# Patient Record
Sex: MALE | Race: WHITE | NOT HISPANIC OR LATINO | Employment: OTHER | URBAN - METROPOLITAN AREA
[De-identification: names, ages, dates, MRNs, and addresses within clinical notes are randomized per-mention and may not be internally consistent; named-entity substitution may affect disease eponyms.]

---

## 2017-03-08 ENCOUNTER — OFFICE VISIT (OUTPATIENT)
Dept: FAMILY MEDICINE | Facility: CLINIC | Age: 67
End: 2017-03-08
Payer: COMMERCIAL

## 2017-03-08 VITALS
TEMPERATURE: 97.8 F | DIASTOLIC BLOOD PRESSURE: 82 MMHG | WEIGHT: 159 LBS | SYSTOLIC BLOOD PRESSURE: 138 MMHG | BODY MASS INDEX: 25.55 KG/M2 | HEIGHT: 66 IN | HEART RATE: 54 BPM

## 2017-03-08 DIAGNOSIS — K21.9 GASTROESOPHAGEAL REFLUX DISEASE WITHOUT ESOPHAGITIS: ICD-10-CM

## 2017-03-08 DIAGNOSIS — I10 ESSENTIAL HYPERTENSION: ICD-10-CM

## 2017-03-08 DIAGNOSIS — J20.9 BRONCHITIS WITH BRONCHOSPASM: ICD-10-CM

## 2017-03-08 PROCEDURE — 99214 OFFICE O/P EST MOD 30 MIN: CPT | Performed by: FAMILY MEDICINE

## 2017-03-08 RX ORDER — ATENOLOL 50 MG/1
50 TABLET ORAL DAILY
Qty: 30 TABLET | Refills: 11 | Status: SHIPPED | OUTPATIENT
Start: 2017-03-08 | End: 2018-03-05

## 2017-03-08 RX ORDER — ALBUTEROL SULFATE 90 UG/1
2 AEROSOL, METERED RESPIRATORY (INHALATION) EVERY 4 HOURS PRN
Qty: 1 INHALER | Refills: 11 | Status: SHIPPED | OUTPATIENT
Start: 2017-03-08 | End: 2018-03-05

## 2017-03-08 NOTE — PATIENT INSTRUCTIONS
Thank you for choosing [facility].  You may be receiving a survey in the mail from Arnaldo Mitchell regarding your visit today.  Please take a few minutes to complete and return the survey to let us know how we are doing.      If you have questions or concerns, please contact us via Metaconomy or you can contact your care team at [phone].    Our Clinic hours are:  Monday 6:40 am  to 7:00 pm  Tuesday -Friday 6:40 am to 5:00 pm    The [place] outpatient lab hours are:  Monday - Friday 6:10 am to 4:45 pm  Saturdays 7:00 am to 11:00 am  Appointments are required, call [phone]    If you have clinical questions after hours or would like to schedule an appointment,  call the clinic at [phone].    (K21.9) Gastroesophageal reflux disease without esophagitis  Comment:   Plan: omeprazole (PRILOSEC) 20 MG CR capsule        Stay on the med and the dietary and mechanical instructions. Refill and recheck annually if doing well.   The goal for the symptoms is to be zero.     (I10) Essential hypertension  Comment:   Plan: atenolol (TENORMIN) 50 MG tablet        Monitor and record the Bp readings and the goal for the average is under 130/80. Use the med and the non drug therapies.   If doing well then refill and recheck annually.     (J20.9) Bronchitis with bronchospasm  Comment:   Plan: albuterol (PROAIR HFA/PROVENTIL HFA/VENTOLIN         HFA) 108 (90 BASE) MCG/ACT Inhaler        Use the inhaler as needed and this is refilled for one year. Use the flu shot in the fall.

## 2017-03-08 NOTE — NURSING NOTE
"Chief Complaint   Patient presents with     Hypertension     Recheck on blood pressure.     Gastric Problem     Recheck on medication.     Derm Problem     Check of a mole on the left chest.       Initial /82  Pulse 54  Temp 97.8  F (36.6  C) (Tympanic)  Ht 5' 5.75\" (1.67 m)  Wt 159 lb (72.1 kg)  BMI 25.86 kg/m2 Estimated body mass index is 25.86 kg/(m^2) as calculated from the following:    Height as of this encounter: 5' 5.75\" (1.67 m).    Weight as of this encounter: 159 lb (72.1 kg).  Medication Reconciliation: complete  "

## 2017-03-08 NOTE — PROGRESS NOTES
"  SUBJECTIVE:                                                    Willian Reid is a 67 year old male who presents to clinic today for the following health issues:      Hypertension Follow-up      Outpatient blood pressures are being checked at home.  Results are 133/83, 128/73, 128/77, 125/78, 122/77, 129/77, 119/71, 120/71, 133/77.  Pulse ranges from 53-67. The average BP is 124/76    Low Salt Diet: no added salt     Medication Followup of Reflux    Taking Medication as prescribed: yes    Side Effects:  None    Medication Helping Symptoms:  yes     DERM:  Here to have a mole on the left chest checked.  Has been there for years.  No change in size and it does not itch.  No bleeding.      Amount of exercise or physical activity: Treadmill one hour 7 days per week.    Problems taking medications regularly: No    Medication side effects: none  Diet: No added salt.      Current Outpatient Prescriptions:      omeprazole (PRILOSEC) 20 MG capsule, Take 1 capsule (20 mg) by mouth daily, Disp: 30 capsule, Rfl: 11     atenolol (TENORMIN) 50 MG tablet, Take 1 tablet (50 mg) by mouth daily Pt will call to order, Disp: 30 tablet, Rfl: 11     albuterol (PROAIR HFA, PROVENTIL HFA, VENTOLIN HFA) 108 (90 BASE) MCG/ACT inhaler, Inhale 2 puffs into the lungs every 4 hours as needed for shortness of breath / dyspnea or wheezing, Disp: 1 Inhaler, Rfl: 2     aspirin 81 MG tablet, Take  by mouth., Disp: , Rfl:      MULTIVITAMINS OR TABS, 1 TABLET ORALLY DAILY, Disp: 100, Rfl: 3    Patient Active Problem List   Diagnosis     Other tenosynovitis of hand and wrist     Degeneration of lumbar or lumbosacral intervertebral disc     Esophageal reflux     Plantar fasciitis     Hypertension     CARDIOVASCULAR SCREENING; LDL GOAL LESS THAN 130     Hypertriglyceridemia     Prediabetes     Advanced directives, counseling/discussion       Blood pressure 138/82, pulse 54, temperature 97.8  F (36.6  C), temperature source Tympanic, height 5' 5.75\" " (1.67 m), weight 159 lb (72.1 kg).    Exam:  GENERAL APPEARANCE: healthy, alert and no distress  EYES: EOMI,  PERRL  NECK: no adenopathy, no asymmetry, masses, or scars and thyroid normal to palpation  RESP: lungs clear to auscultation - no rales, rhonchi or wheezes  CV: regular rates and rhythm, normal S1 S2, no S3 or S4 and no murmur, click or rub -  SKIN: mole on the left upper chest about 8 mm in diameter, brown and minimally raised and   Not suspicious for cancer.    PSYCH: mentation appears normal and affect normal/bright      (K21.9) Gastroesophageal reflux disease without esophagitis  Comment:   Plan: omeprazole (PRILOSEC) 20 MG CR capsule        Stay on the med and the dietary and mechanical instructions. Refill and recheck annually if doing well.   The goal for the symptoms is to be zero.     (I10) Essential hypertension  Comment:   Plan: atenolol (TENORMIN) 50 MG tablet        Monitor and record the Bp readings and the goal for the average is under 130/80. Use the med and the non drug therapies.   If doing well then refill and recheck annually.     (J20.9) Bronchitis with bronchospasm  Comment:   Plan: albuterol (PROAIR HFA/PROVENTIL HFA/VENTOLIN         HFA) 108 (90 BASE) MCG/ACT Inhaler        Use the inhaler as needed and this is refilled for one year. Use the flu shot in the fall.     Kirk Oliver

## 2017-03-08 NOTE — MR AVS SNAPSHOT
After Visit Summary   3/8/2017    Willian Reid    MRN: [id number]           Patient Information     Date Of Birth          1950        Visit Information        Provider Department      3/8/2017 9:00 AM Kirk Oliver MD [facility]        Today's Diagnoses     Gastroesophageal reflux disease without esophagitis        Essential hypertension        Bronchitis with bronchospasm          Care Instructions          Thank you for choosing [facility].  You may be receiving a survey in the mail from [facility] regarding your visit today.  Please take a few minutes to complete and return the survey to let us know how we are doing.      If you have questions or concerns, please contact us via Main[employer] or you can contact your care team at [phone].    Our Clinic hours are:  Monday 6:40 am  to 7:00 pm  Tuesday -Friday 6:40 am to 5:00 pm    The [place] outpatient lab hours are:  Monday - Friday 6:10 am to 4:45 pm  Saturdays 7:00 am to 11:00 am  Appointments are required, call [phone]    If you have clinical questions after hours or would like to schedule an appointment,  call the clinic at [phone].    (K21.9) Gastroesophageal reflux disease without esophagitis  Comment:   Plan: omeprazole (PRILOSEC) 20 MG CR capsule        Stay on the med and the dietary and mechanical instructions. Refill and recheck annually if doing well.   The goal for the symptoms is to be zero.     (I10) Essential hypertension  Comment:   Plan: atenolol (TENORMIN) 50 MG tablet        Monitor and record the Bp readings and the goal for the average is under 130/80. Use the med and the non drug therapies.   If doing well then refill and recheck annually.     (J20.9) Bronchitis with bronchospasm  Comment:   Plan: albuterol (PROAIR HFA/PROVENTIL HFA/VENTOLIN         HFA) 108 (90 BASE) MCG/ACT Inhaler        Use the inhaler as needed and this is refilled for one year. Use the flu shot in the fall.  "          Follow-ups after your visit        Who to contact     If you have questions or need follow up information about today's clinic visit or your schedule please contact [facility] directly at [phone].  Normal or non-critical lab and imaging results will be communicated to you by MyChart, letter or phone within 4 business days after the clinic has received the results. If you do not hear from us within 7 days, please contact the clinic through MyChart or phone. If you have a critical or abnormal lab result, we will notify you by phone as soon as possible.  Submit refill requests through [employer] or call your pharmacy and they will forward the refill request to us. Please allow 3 business days for your refill to be completed.          Additional Information About Your Visit        [facility] Information     [employer] gives you secure access to your electronic health record. If you see a primary care provider, you can also send messages to your care team and make appointments. If you have questions, please call your primary care clinic.  If you do not have a primary care provider, please call [phone] and they will assist you.        Care EveryWhere ID     This is your Care EveryWhere ID. This could be used by other organizations to access your [place] medical records  [id number]        Your Vitals Were     Pulse Temperature Height BMI (Body Mass Index)          54 97.8  F (36.6  C) (Tympanic) 5' 5.75\" (1.67 m) 25.86 kg/m2         Blood Pressure from Last 3 Encounters:   03/08/17 138/82   03/02/16 136/82   02/04/16 134/78    Weight from Last 3 Encounters:   03/08/17 159 lb (72.1 kg)   03/02/16 157 lb (71.2 kg)   02/04/16 160 lb (72.6 kg)              Today, you had the following     No orders found for display         Where to get your medicines      These medications were sent to [facility] [place] - [address]  [address] [address]     Phone:  " [phone]     albuterol 108 (90 BASE) MCG/ACT Inhaler    atenolol 50 MG tablet    omeprazole 20 MG CR capsule          Primary Care Provider Office Phone # Fax #    Kirk Oliver [phone]       [facility] [address] [address]        Thank you!     Thank you for choosing [facility]  for your care. Our goal is always to provide you with excellent care. Hearing back from our patients is one way we can continue to improve our services. Please take a few minutes to complete the written survey that you may receive in the mail after your visit with us. Thank you!             Your Updated Medication List - Protect others around you: Learn how to safely use, store and throw away your medicines at www.disposemymeds.org.          This list is accurate as of: 3/8/17  9:33 AM.  Always use your most recent med list.                   Brand Name Dispense Instructions for use    albuterol 108 (90 BASE) MCG/ACT Inhaler    PROAIR HFA/PROVENTIL HFA/VENTOLIN HFA    1 Inhaler    Inhale 2 puffs into the lungs every 4 hours as needed for shortness of breath / dyspnea or wheezing       aspirin 81 MG tablet      Take  by mouth.       atenolol 50 MG tablet    TENORMIN    30 tablet    Take 1 tablet (50 mg) by mouth daily Pt will call to order       multivitamin per tablet     100    1 TABLET ORALLY DAILY       omeprazole 20 MG CR capsule    priLOSEC    30 capsule    Take 1 capsule (20 mg) by mouth daily

## 2017-10-10 ENCOUNTER — ALLIED HEALTH/NURSE VISIT (OUTPATIENT)
Dept: FAMILY MEDICINE | Facility: CLINIC | Age: 67
End: 2017-10-10
Payer: COMMERCIAL

## 2017-10-10 DIAGNOSIS — Z23 NEED FOR PROPHYLACTIC VACCINATION AND INOCULATION AGAINST INFLUENZA: Primary | ICD-10-CM

## 2017-10-10 PROCEDURE — 99207 ZZC NO CHARGE NURSE ONLY: CPT

## 2017-10-10 PROCEDURE — G0008 ADMIN INFLUENZA VIRUS VAC: HCPCS

## 2017-10-10 PROCEDURE — 90662 IIV NO PRSV INCREASED AG IM: CPT

## 2017-10-10 NOTE — MR AVS SNAPSHOT
After Visit Summary   10/10/2017    Willian Reid    MRN: [id number]           Patient Information     Date Of Birth          1950        Visit Information        Provider Department      10/10/2017 10:35 AM [facility]        Today's Diagnoses     Need for prophylactic vaccination and inoculation against influenza    -  1       Follow-ups after your visit        Who to contact     If you have questions or need follow up information about today's clinic visit or your schedule please contact [facility] directly at [phone].  Normal or non-critical lab and imaging results will be communicated to you by Mesitishart, letter or phone within 4 business days after the clinic has received the results. If you do not hear from us within 7 days, please contact the clinic through [email or web address] or phone. If you have a critical or abnormal lab result, we will notify you by phone as soon as possible.  Submit refill requests through [employer] or call your pharmacy and they will forward the refill request to us. Please allow 3 business days for your refill to be completed.          Additional Information About Your Visit        MyChart Information     [employer] gives you secure access to your electronic health record. If you see a primary care provider, you can also send messages to your care team and make appointments. If you have questions, please call your primary care clinic.  If you do not have a primary care provider, please call [phone] and they will assist you.        Care EveryWhere ID     This is your Care EveryWhere ID. This could be used by other organizations to access your [place] medical records  [id number]         Blood Pressure from Last 3 Encounters:   03/08/17 138/82   03/02/16 136/82   02/04/16 134/78    Weight from Last 3 Encounters:   03/08/17 159 lb (72.1 kg)   03/02/16 157 lb (71.2 kg)   02/04/16 160 lb (72.6 kg)              We Performed  the Following     ADMIN INFLUENZA (For MEDICARE Patients ONLY) []     FLU VACCINE, INCREASED ANTIGEN, PRESV FREE, AGE 65+ [98370]        Primary Care Provider Office Phone # Fax #    Kirk Oliver [phone] [address] [address]        Equal Access to Services     COURTNEY ROCHE : Hadii aad ku hadasho Soomaali, waaxda luqadaha, qaybta kaalmada adeegyada, waxay idiin hayaan adeeg khlouisesh larishabhn . So [facility] [phone].    ATENCIÓN: Si habla español, tiene a avila disposición servicios gratuitos de asistencia lingüística. Llame al [phone].    We comply with applicable federal civil rights laws and [place] laws. We do not discriminate on the basis of race, color, national origin, age, disability, sex, sexual orientation, or gender identity.            Thank you!     Thank you for choosing [facility]  for your care. Our goal is always to provide you with excellent care. Hearing back from our patients is one way we can continue to improve our services. Please take a few minutes to complete the written survey that you may receive in the mail after your visit with us. Thank you!             Your Updated Medication List - Protect others around you: Learn how to safely use, store and throw away your medicines at www.disposemymeds.org.          This list is accurate as of: 10/10/17 10:45 AM.  Always use your most recent med list.                   Brand Name Dispense Instructions for use Diagnosis    albuterol 108 (90 BASE) MCG/ACT Inhaler    PROAIR HFA/PROVENTIL HFA/VENTOLIN HFA    1 Inhaler    Inhale 2 puffs into the lungs every 4 hours as needed for shortness of breath / dyspnea or wheezing    Bronchitis with bronchospasm       aspirin 81 MG tablet      Take  by mouth.        atenolol 50 MG tablet    TENORMIN    30 tablet    Take 1 tablet (50 mg) by mouth daily Pt will call to order    Essential hypertension       multivitamin per tablet     100    1  TABLET ORALLY DAILY    Hypertension       omeprazole 20 MG CR capsule    priLOSEC    30 capsule    Take 1 capsule (20 mg) by mouth daily    Gastroesophageal reflux disease without esophagitis

## 2017-10-10 NOTE — PROGRESS NOTES
Injectable Influenza Immunization Documentation    1.  Is the person to be vaccinated sick today?   No    2. Does the person to be vaccinated have an allergy to a component   of the vaccine?   No    3. Has the person to be vaccinated ever had a serious reaction   to influenza vaccine in the past?   No    4. Has the person to be vaccinated ever had Guillain-Barré syndrome?   No    Form completed by Katie Valle CMA  Per orders of Dr. Felix, injection of flu vaccine given by Katie Valle. Patient instructed to remain in clinic for 15 minutes afterwards, and to report any adverse reaction to me immediately.

## 2018-03-05 ENCOUNTER — OFFICE VISIT (OUTPATIENT)
Dept: FAMILY MEDICINE | Facility: CLINIC | Age: 68
End: 2018-03-05
Payer: COMMERCIAL

## 2018-03-05 VITALS
BODY MASS INDEX: 26.12 KG/M2 | WEIGHT: 156.8 LBS | SYSTOLIC BLOOD PRESSURE: 118 MMHG | HEIGHT: 65 IN | HEART RATE: 68 BPM | TEMPERATURE: 97.2 F | DIASTOLIC BLOOD PRESSURE: 80 MMHG

## 2018-03-05 DIAGNOSIS — R73.03 PREDIABETES: ICD-10-CM

## 2018-03-05 DIAGNOSIS — K21.9 GASTROESOPHAGEAL REFLUX DISEASE WITHOUT ESOPHAGITIS: ICD-10-CM

## 2018-03-05 DIAGNOSIS — E78.1 HYPERTRIGLYCERIDEMIA: ICD-10-CM

## 2018-03-05 DIAGNOSIS — Z23 NEED FOR PROPHYLACTIC VACCINATION AGAINST STREPTOCOCCUS PNEUMONIAE (PNEUMOCOCCUS): ICD-10-CM

## 2018-03-05 DIAGNOSIS — Z12.11 SPECIAL SCREENING FOR MALIGNANT NEOPLASMS, COLON: Primary | ICD-10-CM

## 2018-03-05 DIAGNOSIS — J20.9 BRONCHITIS WITH BRONCHOSPASM: ICD-10-CM

## 2018-03-05 DIAGNOSIS — I10 ESSENTIAL HYPERTENSION: ICD-10-CM

## 2018-03-05 PROCEDURE — 99214 OFFICE O/P EST MOD 30 MIN: CPT | Mod: 25 | Performed by: FAMILY MEDICINE

## 2018-03-05 PROCEDURE — G0009 ADMIN PNEUMOCOCCAL VACCINE: HCPCS | Performed by: FAMILY MEDICINE

## 2018-03-05 PROCEDURE — 90670 PCV13 VACCINE IM: CPT | Performed by: FAMILY MEDICINE

## 2018-03-05 RX ORDER — ALBUTEROL SULFATE 90 UG/1
2 AEROSOL, METERED RESPIRATORY (INHALATION) EVERY 4 HOURS PRN
Qty: 1 INHALER | Refills: 11 | Status: SHIPPED | OUTPATIENT
Start: 2018-03-05 | End: 2019-12-19

## 2018-03-05 RX ORDER — ATENOLOL 50 MG/1
50 TABLET ORAL DAILY
Qty: 30 TABLET | Refills: 11 | Status: SHIPPED | OUTPATIENT
Start: 2018-03-05 | End: 2019-02-06

## 2018-03-05 NOTE — NURSING NOTE
"Screening Questionnaire for Adult Immunization    Are you sick today?   No   Do you have allergies to medications, food, a vaccine component or latex?   Yes   Have you ever had a serious reaction after receiving a vaccination?   No   Do you have a long-term health problem with heart disease, lung disease, asthma, kidney disease, metabolic disease (e.g. diabetes), anemia, or other blood disorder?   No   Do you have cancer, leukemia, HIV/AIDS, or any other immune system problem?   No   In the past 3 months, have you taken medications that affect  your immune system, such as prednisone, other steroids, or anticancer drugs; drugs for the treatment of rheumatoid arthritis, Crohn s disease, or psoriasis; or have you had radiation treatments?   No   Have you had a seizure, or a brain or other nervous system problem?   No   During the past year, have you received a transfusion of blood or blood     products, or been given immune (gamma) globulin or antiviral drug?   No   For women: Are you pregnant or is there a chance you could become        pregnant during the next month?   No   Have you received any vaccinations in the past 4 weeks?   No     Immunization questionnaire was positive for at least one answer.  Ok per guidelines for prevnar 13.        . Patient instructed to remain in clinic for 15 minutes afterwards, and to report any adverse reaction to me immediately.       Screening performed by Bismark Arnett on 3/5/2018 at 9:09 AM.  Chief Complaint   Patient presents with     Gastrophageal Reflux     re check, renew omeprazole     Hypertension     re check, renew Atenolol     Refill Request     Proair inhaler     Health Maintenance     Due for FIT test- order placed, will get prevnar 13 today        Initial /80 (BP Location: Left arm, Patient Position: Chair, Cuff Size: Adult Regular)  Pulse 68  Temp 97.2  F (36.2  C) (Tympanic)  Ht 5' 5.25\" (1.657 m)  Wt 156 lb 12.8 oz (71.1 kg)  BMI 25.89 kg/m2 Estimated " "body mass index is 25.89 kg/(m^2) as calculated from the following:    Height as of this encounter: 5' 5.25\" (1.657 m).    Weight as of this encounter: 156 lb 12.8 oz (71.1 kg).  Medication Reconciliation: complete    "

## 2018-03-05 NOTE — MR AVS SNAPSHOT
After Visit Summary   3/5/2018    Willian Reid    MRN: [id number]           Patient Information     Date Of Birth          1950        Visit Information        Provider Department      3/5/2018 9:00 AM Kirk Oliver MD [facility]        Today's Diagnoses     Special screening for malignant neoplasms, colon    -  1    Need for prophylactic vaccination against Streptococcus pneumoniae (pneumococcus)        Hypertriglyceridemia        Prediabetes        Gastroesophageal reflux disease without esophagitis        Essential hypertension        Bronchitis with bronchospasm          Care Instructions          Thank you for choosing [facility].  You may be receiving a survey in the mail from Arnaldo Mitchell regarding your visit today.  Please take a few minutes to complete and return the survey to let us know how we are doing.      If you have questions or concerns, please contact us via [employer] or you can contact your care team at [phone].    Our Clinic hours are:  Monday 6:40 am  to 7:00 pm  Tuesday -Friday 6:40 am to 5:00 pm    The [place] outpatient lab hours are:  Monday - Friday 6:10 am to 4:45 pm  Saturdays 7:00 am to 11:00 am  Appointments are required, call [phone]    If you have clinical questions after hours or would like to schedule an appointment,  call the clinic at [phone].    (K21.9) Gastroesophageal reflux disease without esophagitis  Comment:   Plan: omeprazole (PRILOSEC) 20 MG CR capsule        Use the med daily and the dietary and mechanical instructions and the goal for the symptoms is to be zero.  Refill and recheck annually if doing well.     (I10) Essential hypertension  Comment:   Plan: atenolol (TENORMIN) 50 MG tablet        Monitor and record the BP at rest and the goal for the average is under 130/80.   Use the med and this is refilled for one year. Call if any side effects. Recheck annually If the BP is normal.     (J20.9) Bronchitis  with bronchospasm  Comment:   Plan: albuterol (PROAIR HFA/PROVENTIL HFA/VENTOLIN         HFA) 108 (90 BASE) MCG/ACT Inhaler        Use the inhaler as needed. Identify triggers to avoid. Refill for one year.   Get the flu shote every fall.     (E78.1) Hypertriglyceridemia  Comment:   Plan: Lipid panel reflex to direct LDL Fasting        Stay on the lower chol diet and exercise daily. Do the fasting labs in the winter of 2019 before the appt.     (R73.03) Prediabetes  Comment:   Plan: **Glucose FUTURE anytime        Use the lower carb diet and exercise daily and do the labs as above.             Follow-ups after your visit        Future tests that were ordered for you today     Open Future Orders        Priority Expected Expires Ordered    **Glucose FUTURE anytime Routine 3/5/2018 3/5/2019 3/5/2018    Lipid panel reflex to direct LDL Fasting Routine 3/5/2018 3/5/2019 3/5/2018    Fecal colorectal cancer screen (FIT) Routine 3/26/2018 5/28/2018 3/5/2018            Who to contact     If you have questions or need follow up information about today's clinic visit or your schedule please contact [facility] directly at [phone].  Normal or non-critical lab and imaging results will be communicated to you by [employer], letter or phone within 4 business days after the clinic has received the results. If you do not hear from us within 7 days, please contact the clinic through [employer] or phone. If you have a critical or abnormal lab result, we will notify you by phone as soon as possible.  Submit refill requests through [employer] or call your pharmacy and they will forward the refill request to us. Please allow 3 business days for your refill to be completed.          Additional Information About Your Visit        [employer] Information     [employer] gives you secure access to your electronic health record. If you see a primary care provider, you can also send messages to your care team and make appointments. If you have  "questions, please call your primary care clinic.  If you do not have a primary care provider, please call [phone] and they will assist you.        Care EveryWhere ID     This is your Care EveryWhere ID. This could be used by other organizations to access your [facility] medical records  [id number]        Your Vitals Were     Pulse Temperature Height BMI (Body Mass Index)          68 97.2  F (36.2  C) (Tympanic) 5' 5.25\" (1.657 m) 25.89 kg/m2         Blood Pressure from Last 3 Encounters:   03/05/18 118/80   03/08/17 138/82   03/02/16 136/82    Weight from Last 3 Encounters:   03/05/18 156 lb 12.8 oz (71.1 kg)   03/08/17 159 lb (72.1 kg)   03/02/16 157 lb (71.2 kg)              We Performed the Following     ADMIN: Vaccine, Initial (64606)     Pneumococcal vaccine 13 valent PCV13 IM (Prevnar) [33026]          Where to get your medicines      These medications were sent to [facility] [address] [facility]  [address] [place] [id number]     Phone:  [phone]     albuterol 108 (90 BASE) MCG/ACT Inhaler    atenolol 50 MG tablet    omeprazole 20 MG CR capsule          Primary Care Provider Office Phone # Fax #    Kirk Oliver [phone] [address] [facility] [address]        Equal Access to Services     MAURI ROCHE : Hadii david lozanoo Soalberto, waaxda luqadaha, qaybta kaalmadeondre leon. So [facility] [phone].    ATENCIÓN: Si habla español, tiene a avila disposición servicios gratuitos de asistencia lingüística. Dannaame al [phone].    We comply with applicable federal civil rights laws and [place] laws. We do not discriminate on the basis of race, color, national origin, age, disability, sex, sexual orientation, or gender identity.            Thank you!     Thank you for choosing [facility]  for your care. Our goal is always to provide you with excellent care. Hearing back from our " patients is one way we can continue to improve our services. Please take a few minutes to complete the written survey that you may receive in the mail after your visit with us. Thank you!             Your Updated Medication List - Protect others around you: Learn how to safely use, store and throw away your medicines at www.disposemymeds.org.          This list is accurate as of 3/5/18  9:57 AM.  Always use your most recent med list.                   Brand Name Dispense Instructions for use Diagnosis    albuterol 108 (90 BASE) MCG/ACT Inhaler    PROAIR HFA/PROVENTIL HFA/VENTOLIN HFA    1 Inhaler    Inhale 2 puffs into the lungs every 4 hours as needed for shortness of breath / dyspnea or wheezing    Bronchitis with bronchospasm       aspirin 81 MG tablet      Take 81 mg by mouth daily        atenolol 50 MG tablet    TENORMIN    30 tablet    Take 1 tablet (50 mg) by mouth daily Pt will call to order    Essential hypertension       multivitamin per tablet     100    1 TABLET ORALLY DAILY    Hypertension       omeprazole 20 MG CR capsule    priLOSEC    30 capsule    Take 1 capsule (20 mg) by mouth daily    Gastroesophageal reflux disease without esophagitis

## 2018-03-05 NOTE — PATIENT INSTRUCTIONS
Thank you for choosing [facility].  You may be receiving a survey in the mail from Arnaldo Mitchell regarding your visit today.  Please take a few minutes to complete and return the survey to let us know how we are doing.      If you have questions or concerns, please contact us via [employer] or you can contact your care team at [phone].    Our Clinic hours are:  Monday 6:40 am  to 7:00 pm  Tuesday -Friday 6:40 am to 5:00 pm    The [place] outpatient lab hours are:  Monday - Friday 6:10 am to 4:45 pm  Saturdays 7:00 am to 11:00 am  Appointments are required, call [phone]    If you have clinical questions after hours or would like to schedule an appointment,  call the clinic at [phone].    (K21.9) Gastroesophageal reflux disease without esophagitis  Comment:   Plan: omeprazole (PRILOSEC) 20 MG CR capsule        Use the med daily and the dietary and mechanical instructions and the goal for the symptoms is to be zero.  Refill and recheck annually if doing well.     (I10) Essential hypertension  Comment:   Plan: atenolol (TENORMIN) 50 MG tablet        Monitor and record the BP at rest and the goal for the average is under 130/80.   Use the med and this is refilled for one year. Call if any side effects. Recheck annually If the BP is normal.     (J20.9) Bronchitis with bronchospasm  Comment:   Plan: albuterol (PROAIR HFA/PROVENTIL HFA/VENTOLIN         HFA) 108 (90 BASE) MCG/ACT Inhaler        Use the inhaler as needed. Identify triggers to avoid. Refill for one year.   Get the flu shote every fall.     (E78.1) Hypertriglyceridemia  Comment:   Plan: Lipid panel reflex to direct LDL Fasting        Stay on the lower chol diet and exercise daily. Do the fasting labs in the winter of 2019 before the appt.     (R73.03) Prediabetes  Comment:   Plan: **Glucose FUTURE anytime        Use the lower carb diet and exercise daily and do the labs as above.

## 2018-03-05 NOTE — PROGRESS NOTES
SUBJECTIVE:   Willian Reid is a 68 year old male who presents to clinic today for the following health issues:    Chief Complaint   Patient presents with     Gastrophageal Reflux     re check, renew omeprazole     Hypertension     re check, renew Atenolol     Refill Request     Proair inhaler     Health Maintenance     Due for FIT test- order placed, will get prevnar 13 today        Hypertension Follow-up      Outpatient blood pressures are being checked at home.  Results are 121/74 average.    Low Salt Diet: no added salt      Amount of exercise or physical activity: 6-7 days/week for an average of 45-60 minutes treadmill     Problems taking medications regularly: No    Medication side effects: none    Diet: regular (no restrictions)      Medication Followup of Proair, Omeprazole     Taking Medication as prescribed: yes    Side Effects:  None    Medication Helping Symptoms:  Yes. Symptoms are gone.      Current Outpatient Prescriptions:      omeprazole (PRILOSEC) 20 MG CR capsule, Take 1 capsule (20 mg) by mouth daily, Disp: 30 capsule, Rfl: 11     atenolol (TENORMIN) 50 MG tablet, Take 1 tablet (50 mg) by mouth daily Pt will call to order, Disp: 30 tablet, Rfl: 11     albuterol (PROAIR HFA/PROVENTIL HFA/VENTOLIN HFA) 108 (90 BASE) MCG/ACT Inhaler, Inhale 2 puffs into the lungs every 4 hours as needed for shortness of breath / dyspnea or wheezing, Disp: 1 Inhaler, Rfl: 11     aspirin 81 MG tablet, Take 81 mg by mouth daily , Disp: , Rfl:      MULTIVITAMINS OR TABS, 1 TABLET ORALLY DAILY, Disp: 100, Rfl: 3    Patient Active Problem List   Diagnosis     Other tenosynovitis of hand and wrist     Degeneration of lumbar or lumbosacral intervertebral disc     Esophageal reflux     Plantar fasciitis     Hypertension     CARDIOVASCULAR SCREENING; LDL GOAL LESS THAN 130     Hypertriglyceridemia     Prediabetes     Advanced directives, counseling/discussion       Blood pressure 118/80, pulse 68, temperature 97.2  F  "(36.2  C), temperature source Tympanic, height 5' 5.25\" (1.657 m), weight 156 lb 12.8 oz (71.1 kg).    Exam:  GENERAL APPEARANCE: healthy, alert and no distress  EYES: EOMI,  PERRL  NECK: no adenopathy, no asymmetry, masses, or scars and thyroid normal to palpation  RESP: lungs clear to auscultation - no rales, rhonchi or wheezes  CV: regular rates and rhythm, normal S1 S2, no S3 or S4 and no murmur, click or rub -  ABDOMEN:  soft, nontender, no HSM or masses and bowel sounds normal  PSYCH: mentation appears normal and affect normal/bright      (K21.9) Gastroesophageal reflux disease without esophagitis  Comment:   Plan: omeprazole (PRILOSEC) 20 MG CR capsule        Use the med daily and the dietary and mechanical instructions and the goal for the symptoms is to be zero.  Refill and recheck annually if doing well.     (I10) Essential hypertension  Comment:   Plan: atenolol (TENORMIN) 50 MG tablet        Monitor and record the BP at rest and the goal for the average is under 130/80.   Use the med and this is refilled for one year. Call if any side effects. Recheck annually If the BP is normal.     (J20.9) Bronchitis with bronchospasm  Comment:   Plan: albuterol (PROAIR HFA/PROVENTIL HFA/VENTOLIN         HFA) 108 (90 BASE) MCG/ACT Inhaler        Use the inhaler as needed. Identify triggers to avoid. Refill for one year.   Get the flu shote every fall.     (E78.1) Hypertriglyceridemia  Comment:   Plan: Lipid panel reflex to direct LDL Fasting        Stay on the lower chol diet and exercise daily. Do the fasting labs in the winter of 2019 before the appt.     (R73.03) Prediabetes  Comment:   Plan: **Glucose FUTURE anytime        Use the lower carb diet and exercise daily and do the labs as above.       Kirk Oliver"

## 2018-03-12 PROCEDURE — 82274 ASSAY TEST FOR BLOOD FECAL: CPT | Performed by: FAMILY MEDICINE

## 2018-03-13 DIAGNOSIS — Z12.11 SPECIAL SCREENING FOR MALIGNANT NEOPLASMS, COLON: ICD-10-CM

## 2018-03-13 LAB — HEMOCCULT STL QL IA: NEGATIVE

## 2018-04-12 ENCOUNTER — OFFICE VISIT (OUTPATIENT)
Dept: BEHAVIORAL HEALTH | Facility: CLINIC | Age: 68
End: 2018-04-12
Payer: COMMERCIAL

## 2018-04-12 ENCOUNTER — OFFICE VISIT (OUTPATIENT)
Dept: FAMILY MEDICINE | Facility: CLINIC | Age: 68
End: 2018-04-12
Payer: COMMERCIAL

## 2018-04-12 VITALS
DIASTOLIC BLOOD PRESSURE: 85 MMHG | SYSTOLIC BLOOD PRESSURE: 132 MMHG | TEMPERATURE: 97.4 F | HEART RATE: 55 BPM | HEIGHT: 65 IN | BODY MASS INDEX: 25.83 KG/M2 | WEIGHT: 155 LBS

## 2018-04-12 DIAGNOSIS — R19.8 CHANGE IN BOWEL FUNCTION: ICD-10-CM

## 2018-04-12 DIAGNOSIS — F41.8 SITUATIONAL ANXIETY: Primary | ICD-10-CM

## 2018-04-12 DIAGNOSIS — F43.23 ADJUSTMENT DISORDER WITH MIXED ANXIETY AND DEPRESSED MOOD: Primary | ICD-10-CM

## 2018-04-12 PROCEDURE — 99214 OFFICE O/P EST MOD 30 MIN: CPT | Performed by: FAMILY MEDICINE

## 2018-04-12 PROCEDURE — 90791 PSYCH DIAGNOSTIC EVALUATION: CPT | Performed by: SOCIAL WORKER

## 2018-04-12 ASSESSMENT — ANXIETY QUESTIONNAIRES
GAD7 TOTAL SCORE: 19
7. FEELING AFRAID AS IF SOMETHING AWFUL MIGHT HAPPEN: NEARLY EVERY DAY
6. BECOMING EASILY ANNOYED OR IRRITABLE: SEVERAL DAYS
1. FEELING NERVOUS, ANXIOUS, OR ON EDGE: NEARLY EVERY DAY
5. BEING SO RESTLESS THAT IT IS HARD TO SIT STILL: NEARLY EVERY DAY
2. NOT BEING ABLE TO STOP OR CONTROL WORRYING: NEARLY EVERY DAY
3. WORRYING TOO MUCH ABOUT DIFFERENT THINGS: NEARLY EVERY DAY

## 2018-04-12 ASSESSMENT — PATIENT HEALTH QUESTIONNAIRE - PHQ9: 5. POOR APPETITE OR OVEREATING: NEARLY EVERY DAY

## 2018-04-12 NOTE — MR AVS SNAPSHOT
After Visit Summary   4/12/2018    Willian Reid    MRN: [id number]           Patient Information     Date Of Birth          1950        Visit Information        Provider Department      4/12/2018 10:40 AM Kirk Oliver MD [facility]        Today's Diagnoses     Situational anxiety    -  1    Change in bowel function          Care Instructions          Thank you for choosing [facility].  You may be receiving a survey in the mail from [employer] regarding your visit today.  Please take a few minutes to complete and return the survey to let us know how we are doing.      If you have questions or concerns, please contact us via [employer] or you can contact your care team at [phone].    Our Clinic hours are:  Monday 6:40 am  to 7:00 pm  Tuesday -Friday 6:40 am to 5:00 pm    The [place] outpatient lab hours are:  Monday - Friday 6:10 am to 4:45 pm  Saturdays 7:00 am to 11:00 am  Appointments are required, call [phone]    If you have clinical questions after hours or would like to schedule an appointment,  call the clinic at [phone].    (F41.8) Situational anxiety  (primary encounter diagnosis)  Comment:   Plan: MENTAL HEALTH REFERRAL  - Adult; Outpatient         Treatment; Individual/Couples/Family/Group         Therapy/Health Psychology[place] [facility] [place] [facility] [phone]; We will         contact you to schedule the appointment or         please call with any questions        For the anxiety use the daily exercise and meditations. Avoid caffeine and switch to decaf. Avoid cold decongestants. Do not use diet pills and energy drinks.   The referral for short term counseling is done.       (R19.4) Change in bowel function  Comment:   Plan: For the bowels, stay well hydrated and use daily exercise. Go when you have to go. Use the increased fiber in the diet with crunchy vegetables and prunes and raisins.   There are laxative that speed the  movement of the bowels. There stool softeners that make the stools larger and softer.           Follow-ups after your visit        Additional Services     MENTAL HEALTH REFERRAL  - Adult; Outpatient Treatment; Individual/Couples/Family/Group Therapy/Health Psychology; [facility]: [facility] [phone]; We will contact you to schedule the appointment or please call with any questions       All scheduling is subject to the client's specific insurance plan & benefits, provider/location availability, and provider clinical specialities.  Please arrive 15 minutes early for your first appointment and bring your completed paperwork.    Please be aware that coverage of these services is subject to the terms and limitations of your health insurance plan.  Call member services at your health plan with any benefit or coverage questions.                            Who to contact     If you have questions or need follow up information about today's clinic visit or your schedule please contact [facility] directly at [phone].  Normal or non-critical lab and imaging results will be communicated to you by [employer]hart, letter or phone within 4 business days after the clinic has received the results. If you do not hear from us within 7 days, please contact the clinic through [employer] or phone. If you have a critical or abnormal lab result, we will notify you by phone as soon as possible.  Submit refill requests through [facility] or call your pharmacy and they will forward the refill request to us. Please allow 3 business days for your refill to be completed.          Additional Information About Your Visit        [employer] Information     [facility] gives you secure access to your electronic health record. If you see a primary care provider, you can also send messages to your care team and make appointments. If you have questions, please call your primary care clinic.  If you do not have a primary care provider,  "please call [phone] and they will assist you.        Care EveryWhere ID     This is your Care EveryWhere ID. This could be used by other organizations to access your [place] medical records  [id number]        Your Vitals Were     Pulse Temperature Height BMI (Body Mass Index)          55 97.4  F (36.3  C) (Tympanic) 5' 5.25\" (1.657 m) 25.6 kg/m2         Blood Pressure from Last 3 Encounters:   04/12/18 132/85   03/05/18 118/80   03/08/17 138/82    Weight from Last 3 Encounters:   04/12/18 155 lb (70.3 kg)   03/05/18 156 lb 12.8 oz (71.1 kg)   03/08/17 159 lb (72.1 kg)              We Performed the Following     MENTAL HEALTH REFERRAL  - Adult; Outpatient Treatment; Individual/Couples/Family/Group Therapy/Health Psychology; [facility]: [facility] [phone]; We will contact you to schedule the appointment or please call with any questions        Primary Care Provider Office Phone # Fax #    Kirk Oliver [phone] [address] [facility] [address]        Equal Access to Services     COURTNEY ROCHE : Hadii david lozanoo Soalberto, waaxda luqadaha, qaybta kaalmada adeenedinayada, deondre wheatley. So [facility] [phone].    ATENCIÓN: Si habla español, tiene a avila disposición servicios gratuitos de asistencia lingüística. Llame al [phone].    We comply with applicable federal civil rights laws and [place] laws. We do not discriminate on the basis of race, color, national origin, age, disability, sex, sexual orientation, or gender identity.            Thank you!     Thank you for choosing [facility]  for your care. Our goal is always to provide you with excellent care. Hearing back from our patients is one way we can continue to improve our services. Please take a few minutes to complete the written survey that you may receive in the mail after your visit with us. Thank you!             Your Updated Medication List - Protect " others around you: Learn how to safely use, store and throw away your medicines at www.disposemymeds.org.          This list is accurate as of 4/12/18 11:17 AM.  Always use your most recent med list.                   Brand Name Dispense Instructions for use Diagnosis    albuterol 108 (90 Base) MCG/ACT Inhaler    PROAIR HFA/PROVENTIL HFA/VENTOLIN HFA    1 Inhaler    Inhale 2 puffs into the lungs every 4 hours as needed for shortness of breath / dyspnea or wheezing    Bronchitis with bronchospasm       aspirin 81 MG tablet      Take 81 mg by mouth daily        atenolol 50 MG tablet    TENORMIN    30 tablet    Take 1 tablet (50 mg) by mouth daily Pt will call to order    Essential hypertension       multivitamin per tablet     100    1 TABLET ORALLY DAILY    Hypertension       omeprazole 20 MG CR capsule    priLOSEC    30 capsule    Take 1 capsule (20 mg) by mouth daily    Gastroesophageal reflux disease without esophagitis

## 2018-04-12 NOTE — MR AVS SNAPSHOT
After Visit Summary   4/12/2018    Willian Reid    MRN: [id number]           Patient Information     Date Of Birth          1950        Visit Information        Provider Department      4/12/2018 1:00 PM Deepika Gonzalez LICSW [facility]        Today's Diagnoses     Adjustment disorder with mixed anxiety and depressed mood    -  1       Follow-ups after your visit        Your next 10 appointments already scheduled     Apr 19, 2018 10:00 AM CDT   Return Visit with FRANCISCO Boyce   [facility]    [address] [address]   [phone]              Who to contact     If you have questions or need follow up information about today's clinic visit or your schedule please contact [facility] directly at [phone].  Normal or non-critical lab and imaging results will be communicated to you by MyChart, letter or phone within 4 business days after the clinic has received the results. If you do not hear from us within 7 days, please contact the clinic through MyChart or phone. If you have a critical or abnormal lab result, we will notify you by phone as soon as possible.  Submit refill requests through [employer] or call your pharmacy and they will forward the refill request to us. Please allow 3 business days for your refill to be completed.          Additional Information About Your Visit        MyChart Information     [employer] gives you secure access to your electronic health record. If you see a primary care provider, you can also send messages to your care team and make appointments. If you have questions, please call your primary care clinic.  If you do not have a primary care provider, please call [phone] and they will assist you.        Care EveryWhere ID     This is your Care EveryWhere ID. This could be used by other organizations to access your [facility]  records  [id number]         Blood Pressure from Last 3 Encounters:   04/12/18 132/85   03/05/18 118/80   03/08/17 138/82    Weight from Last 3 Encounters:   04/12/18 155 lb (70.3 kg)   03/05/18 156 lb 12.8 oz (71.1 kg)   03/08/17 159 lb (72.1 kg)              Today, you had the following     No orders found for display       Primary Care Provider Office Phone # Fax #    Kirk Kody Oliver [phone] [address] [facility] [address]        Equal Access to Services     MAURI ROCHE : Hadii david nicole hadasho Soalberto, waaxda lumelvinadaha, qaybta kaalmada adeenedinayada, deondre orozco . So [facility] [phone].    ATENCIÓN: Si habla español, tiene a avila disposición servicios gratuitos de asistencia lingüística. Llame al [phone].    We comply with applicable federal civil rights laws and [place] laws. We do not discriminate on the basis of race, color, national origin, age, disability, sex, sexual orientation, or gender identity.            Thank you!     Thank you for choosing [facility]  for your care. Our goal is always to provide you with excellent care. Hearing back from our patients is one way we can continue to improve our services. Please take a few minutes to complete the written survey that you may receive in the mail after your visit with us. Thank you!             Your Updated Medication List - Protect others around you: Learn how to safely use, store and throw away your medicines at www.disposemymeds.org.          This list is accurate as of 4/12/18 11:59 PM.  Always use your most recent med list.                   Brand Name Dispense Instructions for use Diagnosis    albuterol 108 (90 Base) MCG/ACT Inhaler    PROAIR HFA/PROVENTIL HFA/VENTOLIN HFA    1 Inhaler    Inhale 2 puffs into the lungs every 4 hours as needed for shortness of breath / dyspnea or wheezing    Bronchitis with bronchospasm       aspirin 81 MG tablet      Take 81 mg by mouth  daily        atenolol 50 MG tablet    TENORMIN    30 tablet    Take 1 tablet (50 mg) by mouth daily Pt will call to order    Essential hypertension       multivitamin per tablet     100    1 TABLET ORALLY DAILY    Hypertension       omeprazole 20 MG CR capsule    priLOSEC    30 capsule    Take 1 capsule (20 mg) by mouth daily    Gastroesophageal reflux disease without esophagitis

## 2018-04-12 NOTE — PROGRESS NOTES
SUBJECTIVE:   Willian Reid is a 68 year old male who presents to clinic today for the following health issues:      Abdominal Pain-Pressure      Duration: Just prior to 3-5-18    Description (location/character/radiation): his bowels have been different lately with some pressure. This is better after the BM. No tenderness. Afternoon is when symptoms seem to start.  Can have symptoms for the left lower abdominal area.  Now in the upper abdominal area also. He uses laxatives rarely.     Cousin just passed away from colon cancer.  She was 76.    He has also been under stress with his mother-in-law passing away recently.  His wife was also ill in the hospital around that time.  Not sure if that is related to his symptoms.    Wife states he had a breakdown this morning.  He is filling out the PHQ-9 Gonsalo-7 today.       Associated flank pain: None    Intensity:  mild, moderate    Accompanying signs and symptoms:        Fever/Chills: no        Gas/Bloating: YES- Feels more of a pressure feeling.  More gas in the am and at night.       Nausea/vomitting: no        Diarrhea: no, but there has been changes with his bowel movements after he started to take a laxative and stool softener.  He is not longer taking those.  But since that he is having a bowel movement at night which is not normal for him.  Usually has two in the am.        Dysuria or Hematuria: no     History (previous similar pain/trauma/previous testing): Past history of diverticulitis with surgery 9 years ago.    Precipitating or alleviating factors:       Pain worse with eating/BM/urination: None       Pain relieved by BM: YES    Therapies tried and outcome: He did try a laxative and a stool softener.  That did help.  He is not currently taking any longer.    LMP:  not applicable      Current Outpatient Prescriptions:      omeprazole (PRILOSEC) 20 MG CR capsule, Take 1 capsule (20 mg) by mouth daily, Disp: 30 capsule, Rfl: 11     atenolol (TENORMIN) 50 MG  "tablet, Take 1 tablet (50 mg) by mouth daily Pt will call to order, Disp: 30 tablet, Rfl: 11     albuterol (PROAIR HFA/PROVENTIL HFA/VENTOLIN HFA) 108 (90 BASE) MCG/ACT Inhaler, Inhale 2 puffs into the lungs every 4 hours as needed for shortness of breath / dyspnea or wheezing, Disp: 1 Inhaler, Rfl: 11     aspirin 81 MG tablet, Take 81 mg by mouth daily , Disp: , Rfl:      MULTIVITAMINS OR TABS, 1 TABLET ORALLY DAILY, Disp: 100, Rfl: 3    Patient Active Problem List   Diagnosis     Other tenosynovitis of hand and wrist     Degeneration of lumbar or lumbosacral intervertebral disc     Esophageal reflux     Plantar fasciitis     Hypertension     CARDIOVASCULAR SCREENING; LDL GOAL LESS THAN 130     Hypertriglyceridemia     Prediabetes     Advanced directives, counseling/discussion       Blood pressure 132/85, pulse 55, temperature 97.4  F (36.3  C), temperature source Tympanic, height 5' 5.25\" (1.657 m), weight 155 lb (70.3 kg).    Exam:  GENERAL APPEARANCE: healthy, alert and no distress  ABDOMEN:  soft, nontender, no HSM or masses and bowel sounds normal  PSYCH: mentation appears normal and anxious      (F41.8) Situational anxiety  (primary encounter diagnosis)  Comment:   Plan: MENTAL HEALTH REFERRAL  - Adult; Outpatient         Treatment; Individual/Couples/Family/Group         [place]Health Psychology[place] [facility] [place] [facility] [phone]; We will         contact you to schedule the appointment or         please call with any questions        For the anxiety use the daily exercise and meditations. Avoid caffeine and switch to decaf. Avoid cold decongestants. Do not use diet pills and energy drinks.   The referral for short term counseling is done.     (R19.4) Change in bowel function  Comment:   Plan: For the bowels, stay well hydrated and use daily exercise. Go when you have to go. Use the increased fiber in the diet with crunchy vegetables and prunes and raisins.   There are laxative that " speed the movement of the bowels. There stool softeners that make the stools larger and softer.       Kirk Oliver

## 2018-04-12 NOTE — NURSING NOTE
"Chief Complaint   Patient presents with     Abdominal pressure     Started around 3-5-18.       Initial /85  Pulse 55  Temp 97.4  F (36.3  C) (Tympanic)  Ht 5' 5.25\" (1.657 m)  Wt 155 lb (70.3 kg)  BMI 25.6 kg/m2 Estimated body mass index is 25.6 kg/(m^2) as calculated from the following:    Height as of this encounter: 5' 5.25\" (1.657 m).    Weight as of this encounter: 155 lb (70.3 kg).  Medication Reconciliation: complete  "

## 2018-04-12 NOTE — PATIENT INSTRUCTIONS
Thank you for choosing [facility].  You may be receiving a survey in the mail from Arnaldo Mitchell regarding your visit today.  Please take a few minutes to complete and return the survey to let us know how we are doing.      If you have questions or concerns, please contact us via [employer] or you can contact your care team at [phone].    Our Clinic hours are:  Monday 6:40 am  to 7:00 pm  Tuesday -Friday 6:40 am to 5:00 pm    The [place] outpatient lab hours are:  Monday - Friday 6:10 am to 4:45 pm  Saturdays 7:00 am to 11:00 am  Appointments are required, call [phone]    If you have clinical questions after hours or would like to schedule an appointment,  call the clinic at [phone].    (F41.8) Situational anxiety  (primary encounter diagnosis)  Comment:   Plan: MENTAL HEALTH REFERRAL  - Adult; Outpatient         Treatment; Individual/Couples/Family/Group         Therapy/Health Psychology; G: [facility] [phone]; We will         contact you to schedule the appointment or         please call with any questions        For the anxiety use the daily exercise and meditations. Avoid caffeine and switch to decaf. Avoid cold decongestants. Do not use diet pills and energy drinks.   The referral for short term counseling is done.       (R19.4) Change in bowel function  Comment:   Plan: For the bowels, stay well hydrated and use daily exercise. Go when you have to go. Use the increased fiber in the diet with crunchy vegetables and prunes and raisins.   There are laxative that speed the movement of the bowels. There stool softeners that make the stools larger and softer.

## 2018-04-13 ASSESSMENT — PATIENT HEALTH QUESTIONNAIRE - PHQ9: SUM OF ALL RESPONSES TO PHQ QUESTIONS 1-9: 17

## 2018-04-13 ASSESSMENT — ANXIETY QUESTIONNAIRES: GAD7 TOTAL SCORE: 19

## 2018-04-16 PROBLEM — F41.8 SITUATIONAL ANXIETY: Status: ACTIVE | Noted: 2018-04-16

## 2018-04-16 NOTE — PROGRESS NOTES
"[facility]  Integrated Behavioral Health Services   Diagnostic Assessment      PATIENT'S NAME: Willian Reid  MRN:   [id number]  :   1950  DATE OF SERVICE: 2018  SERVICE LOCATION: Face to Face in Clinic  Visit Activities: NEW and [facility] Only      Identifying Information:  Patient is a 68 year old year old, ,  male.  Patient attended the session alone.        Referral:  Patient was referred for an assessment by PCP at [facility].   Reason for referral: clarify behavioral health diagnosis, determine behavioral health treatment options, assess client readiness and motivation to change, assess client social situation and address the interaction of behavioral and medical issues.       Patient's Statement of Presenting Concern:  Patient reports the following reason(s) for seeking an assessment at this time: increased stressors over the past few months - this morning he was informed that his \"favorite cousin\" has passed away due to colon cancer. He is experiencing increased worry thoughts and panic.  Patient stated that his symptoms have resulted in the following functional impairments: health maintenance, home life with spouse, management of the household and or completion of tasks, self-care and social interactions      History of Presenting Concern:  Patient reports that these problem(s) began probably in childhood as he reports being \"a worrier\" and a \"caregiver to others\" .  He also reports that in his 40's he experienced panic attacks and \"collapsed\" to this.  He also reports having health issues in the past that increased anxiety.  Patient reports he was in the [employer] from [id number] during [employer].  He reports experiencing anxiety on a daily basis as he was involved in brown and did not know if he would survive.  Patient has attempted to resolve these concerns in the past through: medication(s) from physician / PCP and physician " "/ PCP. Patient reports that other professional(s) are involved in providing support / services.       Social History:  Patient reported he grew up in [place], [place]. He is  the second born of three  children.  Patient reported that his childhood was \"good\".  Patient reported a history of 1 committed relationships or marriages. Patient has been  for 38 years. Patient reported having 2 children. Patient identified extensive stable and meaningful social connections.     Patient lives with wife.  Patient is currently retired.  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  Patient's highest education level was high school graduate. Patient did not identify any learning problems. There are no ethnic, cultural or Orthodox factors that may be relevant for therapy.  Patient did not serve in the .       Mental Health History:  Patient reported the following biological family members or relatives with mental health issues: Mother experienced Anxiety. Patient has received the following mental health services in the past: physician / PCP. Patient is currently receiving the following services: physician / PCP.      Chemical Health History:  Patient reported no family history of chemical health issues. Patient has not received chemical dependency treatment in the past. Patient is not currently receiving any chemical dependency treatment. Patient reports no problems as a result of their drinking / drug use.      Cage-AID score is: Negative.  Based on Cage-Aid score and clinical interview there  are not indications of drug or alcohol abuse.      Discussed the general effects of drugs and alcohol on health and well-being.      Significant Losses / Trauma / Abuse / Neglect Issues:  There are indications or report of significant loss, trauma, abuse or neglect issues related to: death of cousin and mother-in-law with the past month, major " medical problems wife - hospitalized recent - also has many health issues and  / combat experience [employer] [employer] [facility].    Issues of possible neglect are not present.      Medical History:   Patient Active Problem List   Diagnosis     Other tenosynovitis of hand and wrist     Degeneration of lumbar or lumbosacral intervertebral disc     Esophageal reflux     Plantar fasciitis     Hypertension     CARDIOVASCULAR SCREENING; LDL GOAL LESS THAN 130     Hypertriglyceridemia     Prediabetes     Advanced directives, counseling/discussion       Medication Review:  Current Outpatient Prescriptions   Medication     omeprazole (PRILOSEC) 20 MG CR capsule     atenolol (TENORMIN) 50 MG tablet     albuterol (PROAIR HFA/PROVENTIL HFA/VENTOLIN HFA) 108 (90 BASE) MCG/ACT Inhaler     aspirin 81 MG tablet     MULTIVITAMINS OR TABS     No current facility-administered medications for this visit.        Patient was provided recommendation to follow-up with physician.    Mental Status Assessment:  Appearance:   Appropriate   Eye Contact:   Good   Psychomotor Behavior: Restless   Attitude:   Cooperative   Orientation:   All  Speech   Rate / Production: Normal    Volume:  Normal   Mood:    Anxious  Normal Sad   Affect:    Worrisome   Thought Content:  Clear   Thought Form:  Coherent  Logical   Insight:    Good       Safety Assessment:    Patient denies a history of suicidal ideation, suicide attempts, self-injurious behavior, homicidal ideation, homicidal behavior and and other safety concerns  Patient denies current or recent suicidal ideation or behaviors.  Patient denies current or recent homicidal ideation or behaviors.  Patient denies current or recent self injurious behavior or ideation.  Patient denies other safety concerns.  Patient reports there are no firearms in the house  Protective Factors Children in the home , Sense of responsibility to family, Religiosity, Life Satisfaction, Reality testing ability,  Positive coping skills, Positive problem-solving skills, Positive social support and Positive therapeutic releationships   Risk Factors Abrupt changes in clinical condition      Plan for Safety and Risk Management:  A safety and risk management plan has not been developed at this time, however patient was encouraged to call [place] [phone] should there be a change in any of these risk factors.      Review of Symptoms:  Depression: Sleep Interest Energy Appetite Psychomotor slowing or agitation Ruminations  Divine:  No symptoms  Psychosis: No symptoms  Anxiety: Worries Nervousness Feeling nervous anxious or on edge, not being able to stop or control worry thoughts, worrying too much about different things, trouble relaxing, restlessness, and feeling afraid as if something awful might happen  Panic:  Palpitations Shortness of Breath Tingling Sense of Impending Doom  Post Traumatic Stress Disorder: Increased Arousal Trauma  Obsessive Compulsive Disorder: No symptoms  Eating Disorder: No symptoms  Oppositional Defiant Disorder: No symptoms  ADD / ADHD: No symptoms  Conduct Disorder: No symptoms    Patient's Strengths and Limitations:  Patient identified the following strengths or resources that will help him succeed in counseling: Buddhism, commitment to health and well being, jamila / spirituality, friends / good social support, family support, intelligence and sense of humor. Patient identified the following supports: community involvement, family, Worship / spirituality and friends. Things that may interfere with the patien'ts success in behavioral health services include:increase of symptoms.    Diagnostic Criteria:  A. The development of emotional or behavioral symptoms in response to an identifiable stressor(s) occurring within 3 months of the onset of the stressor(s)  B. These symptoms or behaviors are clinically significant, as evidenced by one or both of the following:       - Significant impairment in  social, occupational, or other important areas of functioning  C. The stress-related disturbance does not meet criteria for another disorder & is not not an exacerbation of another mental disorder  D. The symptoms do not represent normal bereavement  E. Once the stressor or its consequences have terminated, the symptoms do not persist for more than an additional 6 months       * Adjustment Disorder with Mixed Anxiety and Depressed Mood: The predominant manifestation is a combination of depression and anxiety      Functional Status:  Patient's symptoms have caused and are causing reduced functional status in the following areas: Social / Relational - isolates - does not ask others for assistance when needed - also does not make his own needs a priority      DSM5 Diagnoses: (Sustained by DSM5 Criteria Listed Above)  Diagnoses: Adjustment Disorders  309.28 (F43.23) With mixed anxiety and depressed mood  Psychosocial & Contextual Factors: recent deaths in family  - wife increased medical issues  WHODAS Score:23  See Media section of EPIC medical record for completed WHODAS    Preliminary Treatment Plan:    The client reports no currently identified Yazidi, ethnic or cultural issues relevant to therapy.    Initial Treatment will focus on: Adjustment Difficulties related to: family concerns, loss of signigicant relationship and health issues of spouse  Relational Problems related to: letting others know when he needs help - saying no to others - setting priorities  Grief / Loss - loss of cousin and mother-in-law.    Chemical dependency recommendations: No indications of CD issues    As a preliminary treatment goal, patient will develop coping/problem-solving skills to facilitate more adaptive adjustment, will address relationship difficulties in a more adaptive manner and will increase understanding of normal grieving process and will process grief/loss issues in an adaptive manner.    The focus of initial  interventions will be to alleviate anxiety, alleviate depressed mood, facilitate appropriate expression of feelings, increase coping skills, process losses, process traumas, provide homework to reinforce skill development, reduce panic attacks, teach communication skills, teach relaxation strategies and teach stress mangement techniques.    The patient is receiving treatment / structured support from the following professional(s) / service and treatment. Collaboration will be initiated with: primary care physician.    Referral to another professional/service is not indicated at this time..    A Release of Information is not needed at this time.    Report to child or adult protection services was MARCIN.    Deepika Gonzalez, [facility], Behavioral Health Clinician

## 2018-04-19 ENCOUNTER — OFFICE VISIT (OUTPATIENT)
Dept: BEHAVIORAL HEALTH | Facility: CLINIC | Age: 68
End: 2018-04-19
Payer: COMMERCIAL

## 2018-04-19 DIAGNOSIS — F43.23 ADJUSTMENT DISORDER WITH MIXED ANXIETY AND DEPRESSED MOOD: Primary | ICD-10-CM

## 2018-04-19 PROCEDURE — 90832 PSYTX W PT 30 MINUTES: CPT | Performed by: SOCIAL WORKER

## 2018-04-19 ASSESSMENT — ANXIETY QUESTIONNAIRES
2. NOT BEING ABLE TO STOP OR CONTROL WORRYING: NEARLY EVERY DAY
5. BEING SO RESTLESS THAT IT IS HARD TO SIT STILL: NEARLY EVERY DAY
1. FEELING NERVOUS, ANXIOUS, OR ON EDGE: NEARLY EVERY DAY
GAD7 TOTAL SCORE: 19
7. FEELING AFRAID AS IF SOMETHING AWFUL MIGHT HAPPEN: MORE THAN HALF THE DAYS
IF YOU CHECKED OFF ANY PROBLEMS ON THIS QUESTIONNAIRE, HOW DIFFICULT HAVE THESE PROBLEMS MADE IT FOR YOU TO DO YOUR WORK, TAKE CARE OF THINGS AT HOME, OR GET ALONG WITH OTHER PEOPLE: SOMEWHAT DIFFICULT
6. BECOMING EASILY ANNOYED OR IRRITABLE: MORE THAN HALF THE DAYS
3. WORRYING TOO MUCH ABOUT DIFFERENT THINGS: NEARLY EVERY DAY

## 2018-04-19 ASSESSMENT — PATIENT HEALTH QUESTIONNAIRE - PHQ9: 5. POOR APPETITE OR OVEREATING: NEARLY EVERY DAY

## 2018-04-19 NOTE — MR AVS SNAPSHOT
After Visit Summary   4/19/2018    Willian Reid    MRN: [id number]           Patient Information     Date Of Birth          1950        Visit Information        Provider Department      4/19/2018 10:00 AM Deepika Gonzalez LICSW [facility]        Today's Diagnoses     Adjustment disorder with mixed anxiety and depressed mood    -  1       Follow-ups after your visit        Your next 10 appointments already scheduled     May 10, 2018 10:00 AM CDT   Return Visit with FRANCISCO Boyce   [facility] ([facility]    [address] [address]   [phone]              Who to contact     If you have questions or need follow up information about today's clinic visit or your schedule please contact [facility] directly at [phone].  Normal or non-critical lab and imaging results will be communicated to you by MyChart, letter or phone within 4 business days after the clinic has received the results. If you do not hear from us within 7 days, please contact the clinic through MyChart or phone. If you have a critical or abnormal lab result, we will notify you by phone as soon as possible.  Submit refill requests through Collision Hub or call your pharmacy and they will forward the refill request to us. Please allow 3 business days for your refill to be completed.          Additional Information About Your Visit        MyChart Information     Collision Hub gives you secure access to your electronic health record. If you see a primary care provider, you can also send messages to your care team and make appointments. If you have questions, please call your primary care clinic.  If you do not have a primary care provider, please call [phone] and they will assist you.        Care EveryWhere ID     This is your Care EveryWhere ID. This could be used by other organizations to access your [facility]  records  [id number]         Blood Pressure from Last 3 Encounters:   04/12/18 132/85   03/05/18 118/80   03/08/17 138/82    Weight from Last 3 Encounters:   04/12/18 155 lb (70.3 kg)   03/05/18 156 lb 12.8 oz (71.1 kg)   03/08/17 159 lb (72.1 kg)              Today, you had the following     No orders found for display       Primary Care Provider Office Phone # Fax #    Kirk Kody Oliver [phone] [address] [address]        Equal Access to Services     MAURI ROCHE : Hadii david nicole hadasho Soalberto, waaxda lumelvinadaha, qaybta kaalmada adeenedinayada, deondre orozco . So [facility] [phone].    ATENCIÓN: Si habla español, tiene a avila disposición servicios gratuitos de asistencia lingüística. Llame al [phone].    We comply with applicable federal civil rights laws and [place] laws. We do not discriminate on the basis of race, color, national origin, age, disability, sex, sexual orientation, or gender identity.            Thank you!     Thank you for choosing [facility]  for your care. Our goal is always to provide you with excellent care. Hearing back from our patients is one way we can continue to improve our services. Please take a few minutes to complete the written survey that you may receive in the mail after your visit with us. Thank you!             Your Updated Medication List - Protect others around you: Learn how to safely use, store and throw away your medicines at www.disposemymeds.org.          This list is accurate as of 4/19/18 11:59 PM.  Always use your most recent med list.                   Brand Name Dispense Instructions for use Diagnosis    albuterol 108 (90 Base) MCG/ACT Inhaler    PROAIR HFA/PROVENTIL HFA/VENTOLIN HFA    1 Inhaler    Inhale 2 puffs into the lungs every 4 hours as needed for shortness of breath / dyspnea or wheezing    Bronchitis with bronchospasm       aspirin 81 MG tablet      Take 81 mg by mouth  daily        atenolol 50 MG tablet    TENORMIN    30 tablet    Take 1 tablet (50 mg) by mouth daily Pt will call to order    Essential hypertension       multivitamin per tablet     100    1 TABLET ORALLY DAILY    Hypertension       omeprazole 20 MG CR capsule    priLOSEC    30 capsule    Take 1 capsule (20 mg) by mouth daily    Gastroesophageal reflux disease without esophagitis

## 2018-04-20 ASSESSMENT — ANXIETY QUESTIONNAIRES: GAD7 TOTAL SCORE: 19

## 2018-04-20 ASSESSMENT — PATIENT HEALTH QUESTIONNAIRE - PHQ9: SUM OF ALL RESPONSES TO PHQ QUESTIONS 1-9: 13

## 2018-04-25 NOTE — PROGRESS NOTES
[facility] Primary Care  April 19, 2018      Behavioral Health Clinician Progress Note    Patient Name: Willian Reid           Service Type: Individual      Service Location:  in clinic      Session Start Time: 10:05 AM  Session End Time: 10:30 AM      Session Length: 16 - 37      Attendees: Patient    Visit Activities (Refresh list every visit): [facility] Only    Diagnostic Assessment Date: 4/12/2018  Treatment Plan Review Date: Not completed  See Flowsheets for today's PHQ-9 and KAMALJIT-7 results  Previous PHQ-9:   PHQ-9 SCORE 4/12/2018 4/19/2018   Total Score 17 13     Previous KAMALJIT-7:   KAMALJIT-7 SCORE 4/12/2018 4/19/2018   Total Score 19 19       HELEN LEVEL:  HELEN Score (Last Two) 9/28/2010 4/12/2018   HELEN Raw Score 43 31   Activation Score 68.5 59.3   HELEN Level 4 3       DATA  Extended Session (60+ minutes): No  Interactive Complexity: No  Crisis: No    Treatment Objective(s) Addressed in This Session:  Target Behavior(s): disease management/lifestyle changes Decrease anxiety and depression increased due to events     Adjustment Difficulties: will develop coping/problem-solving skills to facilitate more adaptive adjustment    Current Stressors / Issues:  Patient reports symptoms continue as he is more aware of symptoms since last session.  He has noticed a decrease in intensity.  Reviewed and practiced breathing and mindfulness exercises in session.  Gave information about local mindfulness classes.  Patient will make appointment with this writer as needed.  He will also call as needed.      Progress on Treatment Objective(s) / Homework:  Satisfactory progress - ACTION (Actively working towards change); Intervened by reinforcing change plan / affirming steps taken    Motivational Interviewing    MI Intervention: Co-Developed Goal: Increase healthy coping behaviors, Expressed Empathy/Understanding, Open-ended questions, Reflections: simple and complex, Change talk (evoked) and Reframe     Change Talk  Expressed by the Patient: Desire to change Ability to change Reasons to change Need to change Committment to change    Provider Response to Change Talk: E - Evoked more info from patient about behavior change, A - Affirmed patient's thoughts, decisions, or attempts at behavior change, R - Reflected patient's change talk and S - Summarized patient's change talk statements      Care Plan review completed: No    Medication Review:  No current psychiatric medications prescribed    Medication Compliance:  NA    Changes in Health Issues:   None reported    Chemical Use Review:   Substance Use: Chemical use reviewed, no active concerns identified      Tobacco Use: No current tobacco use.      Assessment: Current Emotional / Mental Status (status of significant symptoms):  Risk status (Self / Other harm or suicidal ideation)  Patient denies a history of suicidal ideation, suicide attempts, self-injurious behavior, homicidal ideation, homicidal behavior and and other safety concerns  Patient denies current fears or concerns for personal safety.  Patient denies current or recent suicidal ideation or behaviors.  Patient denies current or recent homicidal ideation or behaviors.  Patient denies current or recent self injurious behavior or ideation.  Patient denies other safety concerns.  A safety and risk management plan has not been developed at this time, however patient was encouraged to call [place] [phone] should there be a change in any of these risk factors.    Appearance:   Appropriate   Eye Contact:   Good   Psychomotor Behavior: Normal  Restless   Attitude:   Cooperative   Orientation:   All  Speech   Rate / Production: Normal    Volume:  Normal   Mood:    Anxious  Normal  Affect:    Appropriate   Thought Content:  Clear   Thought Form:  Coherent  Logical   Insight:    Good     Diagnoses:  1. Adjustment disorder with mixed anxiety and depressed mood        Collateral Reports Completed:  Communicated with: Primary  care provider as needed    Plan: (Homework, other):  Patient was given information about behavioral services and encouraged to schedule a follow up appointment with the clinic [facility] as needed.  He was also given deep Breathing Strategies to practice when experiencing anxiety.  CD Recommendations: No indications of CD issues. Deepika Gonzalez, NARCISO, [facility]

## 2018-05-10 ENCOUNTER — OFFICE VISIT (OUTPATIENT)
Dept: BEHAVIORAL HEALTH | Facility: CLINIC | Age: 68
End: 2018-05-10
Payer: COMMERCIAL

## 2018-05-10 DIAGNOSIS — F43.23 ADJUSTMENT DISORDER WITH MIXED ANXIETY AND DEPRESSED MOOD: Primary | ICD-10-CM

## 2018-05-10 PROCEDURE — 90832 PSYTX W PT 30 MINUTES: CPT | Performed by: SOCIAL WORKER

## 2018-05-10 ASSESSMENT — PATIENT HEALTH QUESTIONNAIRE - PHQ9: 5. POOR APPETITE OR OVEREATING: MORE THAN HALF THE DAYS

## 2018-05-10 ASSESSMENT — ANXIETY QUESTIONNAIRES
6. BECOMING EASILY ANNOYED OR IRRITABLE: SEVERAL DAYS
7. FEELING AFRAID AS IF SOMETHING AWFUL MIGHT HAPPEN: NEARLY EVERY DAY
3. WORRYING TOO MUCH ABOUT DIFFERENT THINGS: NEARLY EVERY DAY
IF YOU CHECKED OFF ANY PROBLEMS ON THIS QUESTIONNAIRE, HOW DIFFICULT HAVE THESE PROBLEMS MADE IT FOR YOU TO DO YOUR WORK, TAKE CARE OF THINGS AT HOME, OR GET ALONG WITH OTHER PEOPLE: SOMEWHAT DIFFICULT
5. BEING SO RESTLESS THAT IT IS HARD TO SIT STILL: NEARLY EVERY DAY
2. NOT BEING ABLE TO STOP OR CONTROL WORRYING: NEARLY EVERY DAY
1. FEELING NERVOUS, ANXIOUS, OR ON EDGE: MORE THAN HALF THE DAYS
GAD7 TOTAL SCORE: 17

## 2018-05-10 NOTE — MR AVS SNAPSHOT
After Visit Summary   5/10/2018    Willian Reid    MRN: [id number]           Patient Information     Date Of Birth          1950        Visit Information        Provider Department      5/10/2018 10:00 AM Deepika Gonzalez LICSW [facility]        Today's Diagnoses     Adjustment disorder with mixed anxiety and depressed mood    -  1       Follow-ups after your visit        Who to contact     If you have questions or need follow up information about today's clinic visit or your schedule please contact [facility] directly at [phone].  Normal or non-critical lab and imaging results will be communicated to you by Razzhart, letter or phone within 4 business days after the clinic has received the results. If you do not hear from us within 7 days, please contact the clinic through Razzhart or phone. If you have a critical or abnormal lab result, we will notify you by phone as soon as possible.  Submit refill requests through Synference or call your pharmacy and they will forward the refill request to us. Please allow 3 business days for your refill to be completed.          Additional Information About Your Visit        MyChart Information     Synference gives you secure access to your electronic health record. If you see a primary care provider, you can also send messages to your care team and make appointments. If you have questions, please call your primary care clinic.  If you do not have a primary care provider, please call [phone] and they will assist you.        Care EveryWhere ID     This is your Care EveryWhere ID. This could be used by other organizations to access your [facility] medical records  [id number]         Blood Pressure from Last 3 Encounters:   04/12/18 132/85   03/05/18 118/80   03/08/17 138/82    Weight from Last 3 Encounters:   04/12/18 155 lb (70.3 kg)   03/05/18 156 lb 12.8 oz (71.1 kg)   03/08/17 159 lb (72.1 kg)              Today, you  had the following     No orders found for display       Primary Care Provider Office Phone # Fax #    Kirk Oliver [phone] [address] [facility] [address]        Equal Access to Services     COURTNEY ROCHE : Hadii aad ku hadcasa Soalberto, waaxda luqadaha, qaybta kaalmada adeegyada, deondre murrayn enoch hernandez pam wheatley. So [facility] [phone].    ATENCIÓN: Si habla español, tiene a avila disposición servicios gratuitos de asistencia lingüística. Llame al [phone].    We comply with applicable federal civil rights laws and [place] laws. We do not discriminate on the basis of race, color, national origin, age, disability, sex, sexual orientation, or gender identity.            Thank you!     Thank you for choosing [facility]  for your care. Our goal is always to provide you with excellent care. Hearing back from our patients is one way we can continue to improve our services. Please take a few minutes to complete the written survey that you may receive in the mail after your visit with us. Thank you!             Your Updated Medication List - Protect others around you: Learn how to safely use, store and throw away your medicines at www.disposemymeds.org.          This list is accurate as of 5/10/18 11:59 PM.  Always use your most recent med list.                   Brand Name Dispense Instructions for use Diagnosis    albuterol 108 (90 Base) MCG/ACT Inhaler    PROAIR HFA/PROVENTIL HFA/VENTOLIN HFA    1 Inhaler    Inhale 2 puffs into the lungs every 4 hours as needed for shortness of breath / dyspnea or wheezing    Bronchitis with bronchospasm       aspirin 81 MG tablet      Take 81 mg by mouth daily        atenolol 50 MG tablet    TENORMIN    30 tablet    Take 1 tablet (50 mg) by mouth daily Pt will call to order    Essential hypertension       multivitamin per tablet     100    1 TABLET ORALLY DAILY    Hypertension       omeprazole 20 MG CR capsule    priLOSEC     30 capsule    Take 1 capsule (20 mg) by mouth daily    Gastroesophageal reflux disease without esophagitis

## 2018-05-11 ASSESSMENT — PATIENT HEALTH QUESTIONNAIRE - PHQ9: SUM OF ALL RESPONSES TO PHQ QUESTIONS 1-9: 10

## 2018-05-11 ASSESSMENT — ANXIETY QUESTIONNAIRES: GAD7 TOTAL SCORE: 17

## 2018-05-22 NOTE — PROGRESS NOTES
[facility] Primary Care  May 10, 2018      Behavioral Health Clinician Progress Note    Patient Name: Willian Reid           Service Type: Individual      Service Location:  in clinic      Session Start Time: 10 AM  Session End Time: 10:30 AM      Session Length: 16 - 37      Attendees: Patient    Visit Activities (Refresh list every visit): [facility] Only    Diagnostic Assessment Date: 4/12/2018  Treatment Plan Review Date: 8/10/2018  See Flowsheets for today's PHQ-9 and KAMALJIT-7 results  Previous PHQ-9:   PHQ-9 SCORE 4/12/2018 4/19/2018 5/10/2018   Total Score 17 13 10     Previous KAMALJIT-7:   KAMALJIT-7 SCORE 4/12/2018 4/19/2018 5/10/2018   Total Score 19 19 17       HELEN LEVEL:  HELEN Score (Last Two) 9/28/2010 4/12/2018   HELEN Raw Score 43 31   Activation Score 68.5 59.3   HELEN Level 4 3       DATA  Extended Session (60+ minutes): No  Interactive Complexity: No  Crisis: No    Treatment Objective(s) Addressed in This Session:  Target Behavior(s): disease management/lifestyle changes Decrease anxiety and depression increased due to events     Adjustment Difficulties: will develop coping/problem-solving skills to facilitate more adaptive adjustment    Current Stressors / Issues:  Patient reports symptoms continue and reports a decrease in frequency and intensity in symptoms. Reviewed and practiced breathing and mindfulness exercises in session.  Patient plans to attend local mindfulness class.  He will continue to monitor his mood and symptoms.  He will continue to follow through with all medical recommendations.  Patient will make appointments with this writer as needed.        Progress on Treatment Objective(s) / Homework:  Satisfactory progress - ACTION (Actively working towards change); Intervened by reinforcing change plan / affirming steps taken    Motivational Interviewing    MI Intervention: Co-Developed Goal: Increase healthy coping behaviors, Expressed Empathy/Understanding, Open-ended questions,  Reflections: simple and complex, Change talk (evoked) and Reframe     Change Talk Expressed by the Patient: Desire to change Ability to change Reasons to change Need to change Committment to change    Provider Response to Change Talk: E - Evoked more info from patient about behavior change, A - Affirmed patient's thoughts, decisions, or attempts at behavior change, R - Reflected patient's change talk and S - Summarized patient's change talk statements      Care Plan review completed: No    Medication Review:  No current psychiatric medications prescribed    Medication Compliance:  NA    Changes in Health Issues:   None reported    Chemical Use Review:   Substance Use: Chemical use reviewed, no active concerns identified      Tobacco Use: No current tobacco use.      Assessment: Current Emotional / Mental Status (status of significant symptoms):  Risk status (Self / Other harm or suicidal ideation)  Patient denies a history of suicidal ideation, suicide attempts, self-injurious behavior, homicidal ideation, homicidal behavior and and other safety concerns  Patient denies current fears or concerns for personal safety.  Patient denies current or recent suicidal ideation or behaviors.  Patient denies current or recent homicidal ideation or behaviors.  Patient denies current or recent self injurious behavior or ideation.  Patient denies other safety concerns.  A safety and risk management plan has not been developed at this time, however patient was encouraged to call [place] [phone] should there be a change in any of these risk factors.    Appearance:   Appropriate   Eye Contact:   Good   Psychomotor Behavior: Normal  Restless   Attitude:   Cooperative   Orientation:   All  Speech   Rate / Production: Normal    Volume:  Normal   Mood:    Anxious  Normal  Affect:    Appropriate   Thought Content:  Clear   Thought Form:  Coherent  Logical   Insight:    Good     Diagnoses:  1. Adjustment disorder with mixed anxiety and  depressed mood        Collateral Reports Completed:  Communicated with: Primary care provider as needed    Plan: (Homework, other):  Patient was  encouraged to schedule a follow up appointment with the clinic [facility] as needed.  He was also given deep Breathing Strategies to practice when experiencing anxiety.  CD Recommendations: No indications of CD issues. FRANCISCO Mohr, [facility]      ______________________________________________________________________    Integrated Primary Care Behavioral Health Treatment Plan    Patient's Name: Willian Reid  YOB: 1950    Date: May 10, 2018    DSM-V Diagnoses: Adjustment Disorders  309.28 (F43.23) With mixed anxiety and depressed mood  Psychosocial / Contextual Factors: Recent deaths in the family  WHODAS: see DA    Referral / Collaboration:  Referral to another professional/service is not indicated at this time..    Anticipated number of session or this episode of care: 3-8      MeasurableTreatment Goal(s) related to diagnosis / functional impairment(s)  Goal 1: Patient will decrease symptoms of anxiety and depression as indicated by PHQ 9 score, patito 7 score and self-report.    I will know I've met my goal when I can focus and not feel so anxious.      Objective #A (Patient Action)    Patient will increase coping strategies by 3  to more effectively manage current stressors  Status: New - Date: 5/10/2018     Intervention(s)  [facility] will assign homework As discussed in session  teach Relaxation exercises and mindfulness skills.    Objective #B  Patient will learn and utilize 3 strategies to decrease anxious symptoms  Status: New - Date: 5/10/2018     Intervention(s)  [facility] will assign homework As discussed in session  teach emotional regulation skills. Increase ability to recognize uncomfortable emotions and increase healthy coping behaviors.    Patient has reviewed and agreed to the above plan.    Written by  FRANCISCO Mohr, [facility]

## 2018-10-05 ENCOUNTER — ALLIED HEALTH/NURSE VISIT (OUTPATIENT)
Dept: FAMILY MEDICINE | Facility: CLINIC | Age: 68
End: 2018-10-05
Payer: COMMERCIAL

## 2018-10-05 DIAGNOSIS — Z23 NEED FOR PROPHYLACTIC VACCINATION AND INOCULATION AGAINST INFLUENZA: Primary | ICD-10-CM

## 2018-10-05 PROCEDURE — G0008 ADMIN INFLUENZA VIRUS VAC: HCPCS

## 2018-10-05 PROCEDURE — 90662 IIV NO PRSV INCREASED AG IM: CPT

## 2018-10-05 NOTE — PROGRESS NOTES

## 2019-01-28 DIAGNOSIS — E78.1 HYPERTRIGLYCERIDEMIA: ICD-10-CM

## 2019-01-28 DIAGNOSIS — R73.03 PREDIABETES: ICD-10-CM

## 2019-01-28 LAB
CHOLEST SERPL-MCNC: 214 MG/DL
GLUCOSE SERPL-MCNC: 115 MG/DL (ref 70–99)
HDLC SERPL-MCNC: 42 MG/DL
LDLC SERPL CALC-MCNC: 141 MG/DL
NONHDLC SERPL-MCNC: 172 MG/DL
TRIGL SERPL-MCNC: 156 MG/DL

## 2019-01-28 PROCEDURE — 36415 COLL VENOUS BLD VENIPUNCTURE: CPT | Performed by: FAMILY MEDICINE

## 2019-01-28 PROCEDURE — 82947 ASSAY GLUCOSE BLOOD QUANT: CPT | Performed by: FAMILY MEDICINE

## 2019-01-28 PROCEDURE — 80061 LIPID PANEL: CPT | Performed by: FAMILY MEDICINE

## 2019-02-06 ENCOUNTER — OFFICE VISIT (OUTPATIENT)
Dept: FAMILY MEDICINE | Facility: CLINIC | Age: 69
End: 2019-02-06
Payer: MEDICARE

## 2019-02-06 VITALS
BODY MASS INDEX: 26.23 KG/M2 | TEMPERATURE: 97.8 F | DIASTOLIC BLOOD PRESSURE: 92 MMHG | HEIGHT: 65 IN | SYSTOLIC BLOOD PRESSURE: 158 MMHG | HEART RATE: 64 BPM | OXYGEN SATURATION: 98 % | WEIGHT: 157.4 LBS | RESPIRATION RATE: 16 BRPM

## 2019-02-06 DIAGNOSIS — K21.9 GASTROESOPHAGEAL REFLUX DISEASE WITHOUT ESOPHAGITIS: ICD-10-CM

## 2019-02-06 DIAGNOSIS — R73.03 PREDIABETES: Primary | ICD-10-CM

## 2019-02-06 DIAGNOSIS — I10 ESSENTIAL HYPERTENSION: ICD-10-CM

## 2019-02-06 DIAGNOSIS — E78.1 HYPERTRIGLYCERIDEMIA: ICD-10-CM

## 2019-02-06 PROCEDURE — 99214 OFFICE O/P EST MOD 30 MIN: CPT | Performed by: FAMILY MEDICINE

## 2019-02-06 RX ORDER — ATENOLOL 50 MG/1
50 TABLET ORAL DAILY
Qty: 90 TABLET | Refills: 3 | Status: SHIPPED | OUTPATIENT
Start: 2019-02-06 | End: 2020-02-10

## 2019-02-06 ASSESSMENT — MIFFLIN-ST. JEOR: SCORE: 1395.84

## 2019-02-06 NOTE — PATIENT INSTRUCTIONS
Thank you for choosing [facility].  You may be receiving a survey in the mail from Arnaldo Mitchell regarding your visit today.  Please take a few minutes to complete and return the survey to let us know how we are doing.      If you have questions or concerns, please contact us via HipWay or you can contact your care team at [phone].    Our Clinic hours are:  Monday 6:40 am  to 7:00 pm  Tuesday -Friday 6:40 am to 5:00 pm    The [place] outpatient lab hours are:  Monday - Friday 6:10 am to 4:45 pm  Saturdays 7:00 am to 11:00 am  Appointments are required, call [phone]    If you have clinical questions after hours or would like to schedule an appointment,  call the clinic at [phone].    (I10) Essential hypertension  Comment:   Plan: atenolol (TENORMIN) 50 MG tablet        Monitor and record the BP at rest and the goal for the average is under 130/80.   Use the med and the non drug therapies. If doing well then refill and recheck annually.     (K21.9) Gastroesophageal reflux disease without esophagitis  Comment:   Plan: omeprazole (PRILOSEC) 20 MG DR capsule        Use the med and the dietary and mechanical instructions. The goal for the symptoms is to be zero.   Refill and recheck annually if doing well.     (E78.1) Hypertriglyceridemia  Comment:   Plan: Lipid panel reflex to direct LDL Fasting        Use the lower chol diet and exercise daily. The fasting labs are done annually.     (R73.03) Prediabetes    Comment:   Plan: **Glucose FUTURE anytime        Your glucose was 115, and the goal is under 100. Use the lower carb diet and exercise daily.   Keep the weight stable. Do the glucose lab annually.

## 2019-02-06 NOTE — PROGRESS NOTES
SUBJECTIVE:   Willian Reid is a 69 year old male who presents to clinic today for the following health issues:  Chief Complaint   Patient presents with     Hypertension     Refill Atenolol      Gastrophageal Reflux     Refill Omeprazole     Lipids     Discuss recent lab test results , lipids and glucose      Fall     patient fell yesterday , slipped on ice and fell onto his back. Is 80% better today , but wanted this mentioned to Dr. Oliver     Derm Problem     skin spot on Left side neck he wants checked, irritated, itches.       Hypertension Follow-up      Outpatient blood pressures are being checked at home.  Results are 115-126/68-81.    Low Salt Diet: low salt      Amount of exercise or physical activity: 6-7 days/week for an average of 60 minutes, treadmill, Bike     Problems taking medications regularly: No    Medication side effects: none    Diet: regular (no restrictions)      Medication Followup of Omeprazole     Taking Medication as prescribed: yes    Side Effects:  None    Medication Helping Symptoms:  yes   Lab Result Follow Up  Component      Latest Ref Rng & Units 1/28/2019   Cholesterol      <200 mg/dL 214 (H)   Triglycerides      <150 mg/dL 156 (H)   HDL Cholesterol      >39 mg/dL 42   LDL Cholesterol Calculated      <100 mg/dL 141 (H)   Non HDL Cholesterol      <130 mg/dL 172 (H)   Glucose      70 - 99 mg/dL 115 (H)     Concern - Skin Spot on Left Side Neck x  2 Weeks     Description:   Skin spot on Left side neck, flat. Irritated, Itches at times.     Also, patient fell yesterday , slipped on ice and fell onto his back. Is 80% better today , but wanted this mentioned to Dr. Oliver      Current Outpatient Medications:      albuterol (PROAIR HFA/PROVENTIL HFA/VENTOLIN HFA) 108 (90 BASE) MCG/ACT Inhaler, Inhale 2 puffs into the lungs every 4 hours as needed for shortness of breath / dyspnea or wheezing, Disp: 1 Inhaler, Rfl: 11     aspirin 81 MG tablet, Take 81 mg by mouth daily , Disp: ,  "Rfl:      atenolol (TENORMIN) 50 MG tablet, Take 1 tablet (50 mg) by mouth daily Pt will call to order, Disp: 30 tablet, Rfl: 11     MULTIVITAMINS OR TABS, 1 TABLET ORALLY DAILY, Disp: 100, Rfl: 3     omeprazole (PRILOSEC) 20 MG CR capsule, Take 1 capsule (20 mg) by mouth daily, Disp: 30 capsule, Rfl: 11     Probiotic Product (PRO-BIOTIC BLEND) CAPS, Take 1 capsule by mouth daily, Disp: , Rfl:     Patient Active Problem List   Diagnosis     Other tenosynovitis of hand and wrist     Degeneration of lumbar or lumbosacral intervertebral disc     Esophageal reflux     Plantar fasciitis     Hypertension     CARDIOVASCULAR SCREENING; LDL GOAL LESS THAN 130     Hypertriglyceridemia     Prediabetes     Advanced directives, counseling/discussion     Situational anxiety       Blood pressure 142/82, pulse 64, temperature 97.8  F (36.6  C), temperature source Tympanic, resp. rate 16, height 1.651 m (5' 5\"), weight 71.4 kg (157 lb 6.4 oz), SpO2 98 %.    Exam:  GENERAL APPEARANCE: healthy, alert and no distress  EYES: EOMI,  PERRL  NECK: no adenopathy, no asymmetry, masses, or scars and thyroid normal to palpation  RESP: lungs clear to auscultation - no rales, rhonchi or wheezes  CV: regular rates and rhythm, normal S1 S2, no S3 or S4 and no murmur, click or rub -  ABDOMEN:  soft, nontender, no HSM or masses and bowel sounds normal  PSYCH: mentation appears normal and affect normal/bright      (I10) Essential hypertension  Comment:   Plan: atenolol (TENORMIN) 50 MG tablet        Monitor and record the BP at rest and the goal for the average is under 130/80.   Use the med and the non drug therapies. If doing well then refill and recheck annually.     (K21.9) Gastroesophageal reflux disease without esophagitis  Comment:   Plan: omeprazole (PRILOSEC) 20 MG DR capsule        Use the med and the dietary and mechanical instructions. The goal for the symptoms is to be zero.   Refill and recheck annually if doing well.     (E78.1) " Hypertriglyceridemia  Comment:   Plan: Lipid panel reflex to direct LDL Fasting        Use the lower chol diet and exercise daily. The fasting labs are done annually.     (R73.03) Prediabetes    Comment:   Plan: **Glucose FUTURE anytime        Your glucose was 115, and the goal is under 100. Use the lower carb diet and exercise daily.   Keep the weight stable. Do the glucose lab annually.     Kirk Oliver

## 2019-03-26 ENCOUNTER — DOCUMENTATION ONLY (OUTPATIENT)
Dept: OTHER | Facility: CLINIC | Age: 69
End: 2019-03-26

## 2019-07-18 DIAGNOSIS — I10 ESSENTIAL HYPERTENSION: ICD-10-CM

## 2019-07-19 RX ORDER — ATENOLOL 50 MG/1
TABLET ORAL
Qty: 90 TABLET | Refills: 3 | OUTPATIENT
Start: 2019-07-19

## 2019-10-22 ENCOUNTER — IMMUNIZATION (OUTPATIENT)
Dept: FAMILY MEDICINE | Facility: CLINIC | Age: 69
End: 2019-10-22
Payer: MEDICARE

## 2019-10-22 PROCEDURE — 90662 IIV NO PRSV INCREASED AG IM: CPT

## 2019-10-22 PROCEDURE — G0008 ADMIN INFLUENZA VIRUS VAC: HCPCS

## 2019-11-03 ENCOUNTER — HEALTH MAINTENANCE LETTER (OUTPATIENT)
Age: 69
End: 2019-11-03

## 2019-12-19 ENCOUNTER — OFFICE VISIT (OUTPATIENT)
Dept: FAMILY MEDICINE | Facility: CLINIC | Age: 69
End: 2019-12-19
Payer: MEDICARE

## 2019-12-19 ENCOUNTER — ANCILLARY PROCEDURE (OUTPATIENT)
Dept: GENERAL RADIOLOGY | Facility: CLINIC | Age: 69
End: 2019-12-19
Attending: FAMILY MEDICINE
Payer: MEDICARE

## 2019-12-19 VITALS
BODY MASS INDEX: 26.33 KG/M2 | HEIGHT: 65 IN | RESPIRATION RATE: 20 BRPM | TEMPERATURE: 97.2 F | WEIGHT: 158 LBS | HEART RATE: 60 BPM | OXYGEN SATURATION: 99 % | SYSTOLIC BLOOD PRESSURE: 138 MMHG | DIASTOLIC BLOOD PRESSURE: 86 MMHG

## 2019-12-19 DIAGNOSIS — J20.9 BRONCHITIS WITH BRONCHOSPASM: ICD-10-CM

## 2019-12-19 DIAGNOSIS — M79.644 PAIN IN FINGER OF BOTH HANDS: ICD-10-CM

## 2019-12-19 DIAGNOSIS — M79.645 PAIN IN FINGER OF BOTH HANDS: ICD-10-CM

## 2019-12-19 DIAGNOSIS — Z12.12 SCREENING FOR MALIGNANT NEOPLASM OF THE RECTUM: Primary | ICD-10-CM

## 2019-12-19 PROCEDURE — 73130 X-RAY EXAM OF HAND: CPT | Mod: LT

## 2019-12-19 PROCEDURE — 99214 OFFICE O/P EST MOD 30 MIN: CPT | Performed by: FAMILY MEDICINE

## 2019-12-19 RX ORDER — ALBUTEROL SULFATE 90 UG/1
2 AEROSOL, METERED RESPIRATORY (INHALATION) EVERY 4 HOURS PRN
Qty: 1 INHALER | Refills: 11 | Status: SHIPPED | OUTPATIENT
Start: 2019-12-19 | End: 2020-12-18

## 2019-12-19 ASSESSMENT — ANXIETY QUESTIONNAIRES
2. NOT BEING ABLE TO STOP OR CONTROL WORRYING: MORE THAN HALF THE DAYS
6. BECOMING EASILY ANNOYED OR IRRITABLE: SEVERAL DAYS
3. WORRYING TOO MUCH ABOUT DIFFERENT THINGS: NEARLY EVERY DAY
GAD7 TOTAL SCORE: 10
7. FEELING AFRAID AS IF SOMETHING AWFUL MIGHT HAPPEN: SEVERAL DAYS
1. FEELING NERVOUS, ANXIOUS, OR ON EDGE: SEVERAL DAYS
5. BEING SO RESTLESS THAT IT IS HARD TO SIT STILL: SEVERAL DAYS

## 2019-12-19 ASSESSMENT — PATIENT HEALTH QUESTIONNAIRE - PHQ9
SUM OF ALL RESPONSES TO PHQ QUESTIONS 1-9: 4
5. POOR APPETITE OR OVEREATING: SEVERAL DAYS

## 2019-12-19 ASSESSMENT — MIFFLIN-ST. JEOR: SCORE: 1398.56

## 2019-12-19 NOTE — PATIENT INSTRUCTIONS
Thank you for choosing [facility].  You may be receiving an email and/or telephone survey request from [facility] Customer Experience regarding your visit today.  Please take a few minutes to respond to the survey to let us know how we are doing.      If you have questions or concerns, please contact us via OBOOK or you can contact your care team at [phone].    Our Clinic hours are:  Monday 6:40 am  to 7:00 pm  Tuesday -Friday 6:40 am to 5:00 pm    The [place] outpatient lab hours are:  Monday - Friday 6:10 am to 4:45 pm  Saturdays 7:00 am to 11:00 am  Appointments are required, call [phone]    If you have clinical questions after hours or would like to schedule an appointment,  call the clinic at [phone].    (M79.645,  M79.644) Pain in finger of both hands  Comment:   Plan: XR Hand Bilateral G/E 3 Views        We discussed the inflammation of the joints in the hands. This is most likely osteoarthritis.   Modify activities and use the NSAIDs as discussed. Tylenol may be added. Do the x-rays today and these will be called.   Use ice and avoid heat. If not better then consider recheck in clinic for a cortisone shot.     (J20.9) Bronchitis with bronchospasm  Comment:   Plan: albuterol (PROAIR HFA/PROVENTIL HFA/VENTOLIN         HFA) 108 (90 Base) MCG/ACT inhaler        Instructions given on diagnoses and refills are done for one year.     (Z12.12) Screening for malignant neoplasm of the rectum   Comment:   Plan: Fecal colorectal cancer screen (FIT)        Do this at home and bring it in. Monitor the stools for changes.

## 2019-12-19 NOTE — PROGRESS NOTES
Subjective     Willian Reid is a 69 year old male who presents to clinic today for the following health issues:    HPI   Musculoskeletal problem/pain      Duration: 6-7 weeks    Description  Location: Stiffness of the fingers of both hands.  Left index and middle finger are the worst. He is able to bend his hands, can be stiffer at times.    Intensity:  Moderate-can vary with what he is doing.    Accompanying signs and symptoms: swelling    History  Previous similar problem: YES- has had issues for awhile.  Just getting worse now.  Previous evaluation:  none    Precipitating or alleviating factors:  Trauma or overuse: YES- overuse-with his past work.  Has been recently moving his mom and carrying boxes.  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    Therapies tried and outcome: Tylenol at night before bed.      INHALER:  Discuss about refills of the Albuterol Inhaler.  He will use this as needed.  No side effects from the medication.  This works well when using.    The home BP is 125/77 for the average.       Current Outpatient Medications:      albuterol (PROAIR HFA/PROVENTIL HFA/VENTOLIN HFA) 108 (90 Base) MCG/ACT inhaler, Inhale 2 puffs into the lungs every 4 hours as needed for shortness of breath / dyspnea or wheezing, Disp: 1 Inhaler, Rfl: 11     aspirin 81 MG tablet, Take 81 mg by mouth daily , Disp: , Rfl:      atenolol (TENORMIN) 50 MG tablet, Take 1 tablet (50 mg) by mouth daily Pt will call to order, Disp: 90 tablet, Rfl: 3     MULTIVITAMINS OR TABS, 1 TABLET ORALLY DAILY, Disp: 100, Rfl: 3     omeprazole (PRILOSEC) 20 MG DR capsule, Take 1 capsule (20 mg) by mouth daily, Disp: 90 capsule, Rfl: 3     Probiotic Product (PRO-BIOTIC BLEND) CAPS, Take 1 capsule by mouth daily, Disp: , Rfl:     Patient Active Problem List   Diagnosis     Other tenosynovitis of hand and wrist     Degeneration of lumbar or lumbosacral intervertebral disc      "Esophageal reflux     Plantar fasciitis     Hypertension     CARDIOVASCULAR SCREENING; LDL GOAL LESS THAN 130     Hypertriglyceridemia     Prediabetes     Situational anxiety       Blood pressure 138/86, pulse 60, temperature 97.2  F (36.2  C), temperature source Tympanic, resp. rate 20, height 1.651 m (5' 5\"), weight 71.7 kg (158 lb), SpO2 99 %.    Exam:  GENERAL APPEARANCE: healthy, alert and no distress  RESP: lungs clear to auscultation - no rales, rhonchi or wheezes  CV: regular rates and rhythm, normal S1 S2, no S3 or S4 and no murmur, click or rub -  MS: arthritis of the hands, but there is full ROM of the fingers and the MCP joints.   PSYCH: mentation appears normal and affect normal/bright      (M79.645,  M79.644) Pain in finger of both hands  Comment:   Plan: XR Hand Bilateral G/E 3 Views        We discussed the inflammation of the joints in the hands. This is most likely osteoarthritis.   Modify activities and use the NSAIDs as discussed. Tylenol may be added. Do the x-rays today and these will be called.   Use ice and avoid heat. If not better then consider recheck in clinic for a cortisone shot.     (J20.9) Bronchitis with bronchospasm  Comment:   Plan: albuterol (PROAIR HFA/PROVENTIL HFA/VENTOLIN         HFA) 108 (90 Base) MCG/ACT inhaler        Instructions given on diagnoses and refills are done for one year.     (Z12.12) Screening for malignant neoplasm of the rectum   Comment:   Plan: Fecal colorectal cancer screen (FIT)        Do this at home and bring it in. Monitor the stools for changes.       Kirk Oliver MD    "

## 2019-12-20 ASSESSMENT — ANXIETY QUESTIONNAIRES: GAD7 TOTAL SCORE: 10

## 2020-01-02 DIAGNOSIS — Z12.12 SCREENING FOR MALIGNANT NEOPLASM OF THE RECTUM: ICD-10-CM

## 2020-01-02 LAB — HEMOCCULT STL QL IA: NEGATIVE

## 2020-01-02 PROCEDURE — 82274 ASSAY TEST FOR BLOOD FECAL: CPT | Performed by: FAMILY MEDICINE

## 2020-01-09 DIAGNOSIS — R73.03 PREDIABETES: ICD-10-CM

## 2020-01-09 DIAGNOSIS — E78.1 HYPERTRIGLYCERIDEMIA: ICD-10-CM

## 2020-01-09 LAB
CHOLEST SERPL-MCNC: 182 MG/DL
GLUCOSE SERPL-MCNC: 108 MG/DL (ref 70–99)
HDLC SERPL-MCNC: 49 MG/DL
LDLC SERPL CALC-MCNC: 105 MG/DL
NONHDLC SERPL-MCNC: 133 MG/DL
TRIGL SERPL-MCNC: 141 MG/DL

## 2020-01-09 PROCEDURE — 36415 COLL VENOUS BLD VENIPUNCTURE: CPT | Performed by: FAMILY MEDICINE

## 2020-01-09 PROCEDURE — 82947 ASSAY GLUCOSE BLOOD QUANT: CPT | Performed by: FAMILY MEDICINE

## 2020-01-09 PROCEDURE — 80061 LIPID PANEL: CPT | Performed by: FAMILY MEDICINE

## 2020-02-07 DIAGNOSIS — K21.9 GASTROESOPHAGEAL REFLUX DISEASE WITHOUT ESOPHAGITIS: ICD-10-CM

## 2020-02-07 DIAGNOSIS — I10 ESSENTIAL HYPERTENSION: ICD-10-CM

## 2020-02-07 RX ORDER — ATENOLOL 50 MG/1
50 TABLET ORAL DAILY
Qty: 30 TABLET | Refills: 0 | OUTPATIENT
Start: 2020-02-07

## 2020-02-07 NOTE — TELEPHONE ENCOUNTER
Spoke with pt, he does not need these refilled before his appt on Monday 2/10/2020.    Ani PICHARDO RN

## 2020-02-07 NOTE — TELEPHONE ENCOUNTER
"Requested Prescriptions   Pending Prescriptions Disp Refills     atenolol (TENORMIN) 50 MG tablet [Pharmacy Med Name: ATENOLOL 50 MG TABLET] 30 tablet      Sig: Take 1 Tablet BY MOUTH EVERY DAY       Beta-Blockers Protocol Passed - 2/7/2020  8:00 AM        Passed - Blood pressure under 140/90 in past 12 months     BP Readings from Last 3 Encounters:   12/19/19 138/86   02/06/19 (!) 158/92   04/12/18 132/85                 Passed - Patient is age 6 or older        Passed - Recent (12 mo) or future (30 days) visit within the authorizing provider's specialty     Patient has had an office visit with the authorizing provider or a provider within the authorizing providers department within the previous 12 mos or has a future within next 30 days. See \"Patient Info\" tab in inbasket, or \"Choose Columns\" in Meds & Orders section of the refill encounter.              Passed - Medication is active on med list   Last Written Prescription Date:  2/6/19  Last Fill Quantity: 90,  # refills: 3   Last office visit: 12/19/2019 with prescribing provider:  Benito   Future Office Visit:   Next 5 appointments (look out 90 days)    Feb 10, 2020  9:20 AM CST  Kai Brooks with Kirk Oliver MD  [facility]  Arrive at: Clinic [facility] [address] [facility] [address]  [phone]              omeprazole (PRILOSEC) 20 MG DR capsule [Pharmacy Med Name: OMEPRAZOLE 20 MG CAPSULE DR] 30 capsule      Sig: Take 1 Capsule BY MOUTH EVERY DAY       PPI Protocol Passed - 2/7/2020  8:00 AM        Passed - Not on Clopidogrel (unless Pantoprazole ordered)        Passed - No diagnosis of osteoporosis on record        Passed - Recent (12 mo) or future (30 days) visit within the authorizing provider's specialty     Patient has had an office visit with the authorizing provider or a provider within the authorizing providers department within the previous 12 mos or has a future within next 30 days. See " "\"Patient Info\" tab in inbasket, or \"Choose Columns\" in Meds & Orders section of the refill encounter.              Passed - Medication is active on med list        Passed - Patient is age 18 or older        Last Written Prescription Date:  2/6/19  Last Fill Quantity: 90,  # refills: 3   Last office visit: 12/19/2019 with prescribing provider:  Benito   Future Office Visit:   Next 5 appointments (look out 90 days)    Feb 10, 2020  9:20 AM CST  Kai Brooks with Kirk Oliver MD  [facility] ([facility]  Arrive at: Clinic [address] [facility] [address]  [phone]           "

## 2020-02-10 ENCOUNTER — HEALTH MAINTENANCE LETTER (OUTPATIENT)
Age: 70
End: 2020-02-10

## 2020-02-10 ENCOUNTER — OFFICE VISIT (OUTPATIENT)
Dept: FAMILY MEDICINE | Facility: CLINIC | Age: 70
End: 2020-02-10
Payer: MEDICARE

## 2020-02-10 VITALS
DIASTOLIC BLOOD PRESSURE: 86 MMHG | RESPIRATION RATE: 20 BRPM | TEMPERATURE: 98.5 F | BODY MASS INDEX: 25.39 KG/M2 | SYSTOLIC BLOOD PRESSURE: 156 MMHG | HEART RATE: 68 BPM | HEIGHT: 66 IN | WEIGHT: 158 LBS | OXYGEN SATURATION: 98 %

## 2020-02-10 DIAGNOSIS — K21.9 GASTROESOPHAGEAL REFLUX DISEASE WITHOUT ESOPHAGITIS: ICD-10-CM

## 2020-02-10 DIAGNOSIS — I10 ESSENTIAL HYPERTENSION: ICD-10-CM

## 2020-02-10 PROCEDURE — 99214 OFFICE O/P EST MOD 30 MIN: CPT | Performed by: FAMILY MEDICINE

## 2020-02-10 RX ORDER — ATENOLOL 50 MG/1
TABLET ORAL
Qty: 135 TABLET | Refills: 3 | Status: SHIPPED | OUTPATIENT
Start: 2020-02-10 | End: 2020-03-04

## 2020-02-10 ASSESSMENT — MIFFLIN-ST. JEOR: SCORE: 1401.49

## 2020-02-10 NOTE — PATIENT INSTRUCTIONS
Thank you for choosing [facility].  You may be receiving an email and/or telephone survey request from [facility] Customer Experience regarding your visit today.  Please take a few minutes to respond to the survey to let us know how we are doing.      If you have questions or concerns, please contact us via Solexant or you can contact your care team at [phone].    Our Clinic hours are:  Monday 6:40 am  to 7:00 pm  Tuesday -Friday 6:40 am to 5:00 pm    The [place] outpatient lab hours are:  Monday - Friday 6:10 am to 4:45 pm  Saturdays 7:00 am to 11:00 am  Appointments are required, call [phone]    If you have clinical questions after hours or would like to schedule an appointment,  call the clinic at [phone].    (I10) Essential hypertension  Comment:   Plan: atenolol (TENORMIN) 50 MG tablet        The BP average is about 138/88. The goal is under 130/80. We will increase the Atenolol to 75 mg daily.   Call if any side effects. Use the non drug therapies.     (K21.9) Gastroesophageal reflux disease without esophagitis  Comment:   Plan: omeprazole (PRILOSEC) 20 MG DR capsule        Use the med and the dietary and mechanical instructions and the goal for the symptoms is to be zero.   Refills are done for one year.

## 2020-02-10 NOTE — PROGRESS NOTES
Subjective     Willian Reid is a 70 year old male who presents to clinic today for the following health issues:    HPI   Hyperlipidemia Follow-Up      Are you regularly taking any medication or supplement to lower your cholesterol?   No    Are you having muscle aches or other side effects that you think could be caused by your cholesterol lowering medication?  No    Hypertension Follow-up  He has been helping his mother move into an Assisted Living location.    Helping clean out her home and putting this up for sale.  Has felt more anxious with all of this.      Do you check your blood pressure regularly outside of the clinic? Yes     Are you following a low salt diet? Yes, not adding extra salt.    Are your blood pressures ever more than 140 on the top number (systolic) OR more   than 90 on the bottom number (diastolic), for example 140/90? Yes   On 2-5-2020 his readings that day were:  191/96, 178/97, 169/90, 133/80.  Since that day his am readings have been:  139/82, 137/90, 147/84, 128/87, 148/99, 138/92.  PM readings:  164/93, 159/99.      How many servings of fruits and vegetables do you eat daily?  1 serving.    On average, how many sweetened beverages do you drink each day (Examples: soda, juice, sweet tea, etc.  Do NOT count diet or artificially sweetened beverages)?   0    How many days per week do you exercise enough to make your heart beat faster? 7    How many minutes a day do you exercise enough to make your heart beat faster? 60 or more, sometimes 30 minutes.    How many days per week do you miss taking your medication? 0    Medication Followup of Reflux    Taking Medication as prescribed: yes    Side Effects:  None    Medication Helping Symptoms:  yes       Current Outpatient Medications:      albuterol (PROAIR HFA/PROVENTIL HFA/VENTOLIN HFA) 108 (90 Base) MCG/ACT inhaler, Inhale 2 puffs into the lungs every 4 hours as needed for shortness of breath / dyspnea or wheezing, Disp: 1 Inhaler, Rfl:  "11     aspirin 81 MG tablet, Take 81 mg by mouth daily , Disp: , Rfl:      atenolol (TENORMIN) 50 MG tablet, Take 75 mg, or 1.5 pills, daily, Disp: 135 tablet, Rfl: 3     MULTIVITAMINS OR TABS, 1 TABLET ORALLY DAILY, Disp: 100, Rfl: 3     omeprazole (PRILOSEC) 20 MG DR capsule, Take 1 capsule (20 mg) by mouth daily, Disp: 90 capsule, Rfl: 3     Probiotic Product (PRO-BIOTIC BLEND) CAPS, Take 1 capsule by mouth daily, Disp: , Rfl:     Patient Active Problem List   Diagnosis     Other tenosynovitis of hand and wrist     Degeneration of lumbar or lumbosacral intervertebral disc     Esophageal reflux     Plantar fasciitis     Hypertension     CARDIOVASCULAR SCREENING; LDL GOAL LESS THAN 130     Hypertriglyceridemia     Prediabetes     Situational anxiety       Blood pressure (!) 156/86, pulse 68, temperature 98.5  F (36.9  C), temperature source Tympanic, resp. rate 20, height 1.664 m (5' 5.5\"), weight 71.7 kg (158 lb), SpO2 98 %.    Exam:  GENERAL APPEARANCE: healthy, alert and no distress  EYES: EOMI,  PERRL  NECK: no adenopathy, no asymmetry, masses, or scars and thyroid normal to palpation  RESP: lungs clear to auscultation - no rales, rhonchi or wheezes  CV: regular rates and rhythm, normal S1 S2, no S3 or S4 and no murmur, click or rub -  SKIN: no suspicious lesions or rashes  PSYCH: mentation appears normal and affect normal/bright      (I10) Essential hypertension  Comment:   Plan: atenolol (TENORMIN) 50 MG tablet        The BP average is about 138/88. The goal is under 130/80. We will increase the Atenolol to 75 mg daily.   Call if any side effects. Use the non drug therapies.     (K21.9) Gastroesophageal reflux disease without esophagitis  Comment:   Plan: omeprazole (PRILOSEC) 20 MG DR capsule        Use the med and the dietary and mechanical instructions and the goal for the symptoms is to be zero.   Refills are done for one year.     iKrk Oliver MD        "

## 2020-03-04 ENCOUNTER — OFFICE VISIT (OUTPATIENT)
Dept: FAMILY MEDICINE | Facility: CLINIC | Age: 70
End: 2020-03-04
Payer: MEDICARE

## 2020-03-04 VITALS
SYSTOLIC BLOOD PRESSURE: 164 MMHG | HEIGHT: 66 IN | DIASTOLIC BLOOD PRESSURE: 100 MMHG | TEMPERATURE: 97.2 F | RESPIRATION RATE: 18 BRPM | BODY MASS INDEX: 25.55 KG/M2 | OXYGEN SATURATION: 98 % | WEIGHT: 159 LBS | HEART RATE: 59 BPM

## 2020-03-04 DIAGNOSIS — I10 ESSENTIAL HYPERTENSION: ICD-10-CM

## 2020-03-04 PROCEDURE — 99213 OFFICE O/P EST LOW 20 MIN: CPT | Performed by: FAMILY MEDICINE

## 2020-03-04 RX ORDER — ATENOLOL 100 MG/1
100 TABLET ORAL DAILY
Qty: 30 TABLET | Refills: 11 | Status: SHIPPED | OUTPATIENT
Start: 2020-03-04 | End: 2020-12-18

## 2020-03-04 ASSESSMENT — MIFFLIN-ST. JEOR: SCORE: 1406.03

## 2020-03-04 NOTE — PROGRESS NOTES
Subjective     Willian Reid is a 70 year old male who presents to clinic today for the following health issues:    HPI   Hypertension Follow-up  Last week when going to a movie they walked up the stairs to the higher level of seating.  He had an episode of dizziness that lasted a few seconds.  No shortness of breath.  He states his medication was increased and was not sure if that could have been the cause.    CLINIC PLAN FROM 2- IS COPIED BELOW:  Essential hypertension  Comment:   Plan: atenolol (TENORMIN) 50 MG tablet        The BP average is about 138/88. The goal is under 130/80. We will increase the Atenolol to 75 mg daily.   Call if any side effects. Use the non drug therapies.       Do you check your blood pressure regularly outside of the clinic? Yes     Are you following a low salt diet? He doesn't add salt to his food.  He has been paying more attention to the salt in the foods he is eating.    Are your blood pressures ever more than 140 on the top number (systolic) OR more   than 90 on the bottom number (diastolic), for example 140/90? Yes, some of the recent readings are:  AM-180/91, 167/100, 147/88, 155/91.  PM-151/96, 140/82, 169/98.      How many servings of fruits and vegetables do you eat daily?  1 serving daily.    On average, how many sweetened beverages do you drink each day (Examples: soda, juice, sweet tea, etc.  Do NOT count diet or artificially sweetened beverages)?   0    How many days per week do you exercise enough to make your heart beat faster? 7    How many minutes a day do you exercise enough to make your heart beat faster? 30 - 60    How many days per week do you miss taking your medication? 0      Current Outpatient Medications:      albuterol (PROAIR HFA/PROVENTIL HFA/VENTOLIN HFA) 108 (90 Base) MCG/ACT inhaler, Inhale 2 puffs into the lungs every 4 hours as needed for shortness of breath / dyspnea or wheezing, Disp: 1 Inhaler, Rfl: 11     aspirin 81 MG tablet, Take 81 mg  "by mouth daily , Disp: , Rfl:      atenolol (TENORMIN) 50 MG tablet, Take 75 mg, or 1.5 pills, daily, Disp: 135 tablet, Rfl: 3     MULTIVITAMINS OR TABS, 1 TABLET ORALLY DAILY, Disp: 100, Rfl: 3     omeprazole (PRILOSEC) 20 MG DR capsule, Take 1 capsule (20 mg) by mouth daily, Disp: 90 capsule, Rfl: 3     Probiotic Product (PRO-BIOTIC BLEND) CAPS, Take 1 capsule by mouth daily, Disp: , Rfl:     Patient Active Problem List   Diagnosis     Other tenosynovitis of hand and wrist     Degeneration of lumbar or lumbosacral intervertebral disc     Esophageal reflux     Plantar fasciitis     Hypertension     CARDIOVASCULAR SCREENING; LDL GOAL LESS THAN 130     Hypertriglyceridemia     Prediabetes     Situational anxiety       Blood pressure (!) 164/100, pulse 59, temperature 97.2  F (36.2  C), temperature source Tympanic, resp. rate 18, height 1.664 m (5' 5.5\"), weight 72.1 kg (159 lb), SpO2 98 %.    Exam:  GENERAL APPEARANCE: healthy, alert and no distress  EYES: EOMI,  PERRL  CV: regular rates and rhythm  PSYCH: mentation appears normal and affect normal/bright      (I10) Essential hypertension  Comment:   Plan: atenolol (TENORMIN) 100 MG tablet        For the BP the goal for the average at rest is under 130/80. You are too high and we will increase the dose of the Atenolol from 75 mg to 100 mg daily now.   Use the non drug therapies. Monitor and record the BP and if this is still too high in 2-3 weeks then just call our clinic RN at [phone] and I will add a new BP med.   Lisinopril at 10 mg daily will be added to Atenolol.       Kirk Oliver MD          "

## 2020-03-04 NOTE — PATIENT INSTRUCTIONS
Thank you for choosing [facility] (formerly [facility]).  You may be receiving an email and/or telephone survey request from [facility] Customer Experience regarding your visit today.  Please take a few minutes to respond to the survey to let us know how we are doing.      If you have questions or concerns, please contact us via Topple Track or you can contact your care team at [phone].    Our Clinic hours are:  Monday 6:40 am  to 7:00 pm  Tuesday -Friday 6:40 am to 5:00 pm    The [place] outpatient lab hours are:  Monday - Friday 6:10 am to 4:45 pm  Saturdays 7:00 am to 11:00 am  Appointments are required, call [phone]    If you have clinical questions after hours or would like to schedule an appointment,  call the clinic at [phone].    (I10) Essential hypertension  Comment:   Plan: atenolol (TENORMIN) 100 MG tablet        For the BP the goal for the average at rest is under 130/80. You are too high and we will increase the dose of the Atenolol from 75 mg to 100 mg daily now.   Use the non drug therapies. Monitor and record the BP and if this is still too high in 2-3 weeks then just call our clinic RN at [phone] and I will add a new BP med.   Lisinopril at 10 mg daily will be added to Atenolol.

## 2020-03-23 ENCOUNTER — TELEPHONE (OUTPATIENT)
Dept: FAMILY MEDICINE | Facility: CLINIC | Age: 70
End: 2020-03-23

## 2020-03-23 DIAGNOSIS — I10 ESSENTIAL HYPERTENSION: Primary | ICD-10-CM

## 2020-03-23 RX ORDER — LISINOPRIL 10 MG/1
10 TABLET ORAL DAILY
Qty: 30 TABLET | Refills: 11 | Status: SHIPPED | OUTPATIENT
Start: 2020-03-23 | End: 2020-04-06

## 2020-03-23 NOTE — TELEPHONE ENCOUNTER
Patient notified. Patient verbalizes understanding and agreement. Scheduled telephone visit.      BK ShaneN, RN

## 2020-03-23 NOTE — TELEPHONE ENCOUNTER
S-(situation): home BP readings    B-(background): 03/04/20 [place]:  Plan: atenolol (TENORMIN) 100 MG tablet        For the BP the goal for the average at rest is under 130/80. You are too high and we will increase the dose of the Atenolol from 75 mg to 100 mg daily now.   Use the non drug therapies. Monitor and record the BP and if this is still too high in 2-3 weeks then just call our clinic RN at [phone] and I will add a new BP med.   Lisinopril at 10 mg daily will be added to Atenolol    A-(assessment): Patient calling to report his home readings have been averaging around 142/83. No side effects noted from increase in med.     R-(recommendations): Continue monitoring or send additional med?     BK ShaneN, RN

## 2020-03-23 NOTE — TELEPHONE ENCOUNTER
Please notify prescription sent to pharmacy for Lisinopril at 10 mg daily. He should add this to his other meds. Could he do a phone visit with me in about 2 weeks?  Kirk Oliver MD

## 2020-03-23 NOTE — TELEPHONE ENCOUNTER
Reason for call:  Patient reporting a symptom    Symptom or request: Pt calling to discuss with Clinic RN and report how his hypertension is doing.  Please call patient and advise.      Duration (how long have symptoms been present): ongiong    Have you been treated for this before? Yes    Additional comments:     Phone Number patient can be reached at:  Cell number on file:    Telephone Information:   Mobile [phone]     Best Time:  any    Can we leave a detailed message on this number:  YES    Call taken on 3/23/2020 at 1:02 PM by Lauryn Cintron

## 2020-04-06 ENCOUNTER — VIRTUAL VISIT (OUTPATIENT)
Dept: FAMILY MEDICINE | Facility: CLINIC | Age: 70
End: 2020-04-06
Payer: MEDICARE

## 2020-04-06 DIAGNOSIS — I10 ESSENTIAL HYPERTENSION: ICD-10-CM

## 2020-04-06 PROCEDURE — 99442 ZZC PHYSICIAN TELEPHONE EVALUATION 11-20 MIN: CPT | Performed by: FAMILY MEDICINE

## 2020-04-06 RX ORDER — LISINOPRIL 20 MG/1
20 TABLET ORAL DAILY
Qty: 30 TABLET | Refills: 11 | Status: SHIPPED | OUTPATIENT
Start: 2020-04-06 | End: 2020-12-18

## 2020-04-06 NOTE — PROGRESS NOTES
"Subjective     Willian Reid is a 70 year old male who is being evaluated via a billable telephone visit.      The patient has been notified of following:     \"This telephone visit will be conducted via a call between you and your physician/provider. We have found that certain health care needs can be provided without the need for a physical exam.  This service lets us provide the care you need with a short phone conversation.  If a prescription is necessary we can send it directly to your pharmacy.  If lab work is needed we can place an order for that and you can then stop by our lab to have the test done at a later time.    Patient has given verbal consent for Telephone visit?  Yes    Willian Reid complains of   Chief Complaint   Patient presents with     Hypertension     Recheck on blood pressure.       ALLERGIES  Tetracycline    Hypertension Follow-up  Lisinopril 10 mg was added to take.   See the phone note from 3-23-20.      Do you check your blood pressure regularly outside of the clinic? Yes     Are you following a low salt diet? Yes    Are your blood pressures ever more than 140 on the top number (systolic) OR more   than 90 on the bottom number (diastolic), for example 140/90? His average blood pressure over the past 8 days is 135/86, 56.  He did have one reading at 143/89.      How many servings of fruits and vegetables do you eat daily?  1 serving    On average, how many sweetened beverages do you drink each day (Examples: soda, juice, sweet tea, etc.  Do NOT count diet or artificially sweetened beverages)?   0-1 for orange and tomato juice, alternating.    How many days per week do you exercise enough to make your heart beat faster? 7    How many minutes a day do you exercise enough to make your heart beat faster? 60 or more    How many days per week do you miss taking your medication? 0      Current Outpatient Medications:      albuterol (PROAIR HFA/PROVENTIL HFA/VENTOLIN HFA) 108 (90 Base) " MCG/ACT inhaler, Inhale 2 puffs into the lungs every 4 hours as needed for shortness of breath / dyspnea or wheezing, Disp: 1 Inhaler, Rfl: 11     aspirin 81 MG tablet, Take 81 mg by mouth daily , Disp: , Rfl:      atenolol (TENORMIN) 100 MG tablet, Take 1 tablet (100 mg) by mouth daily, Disp: 30 tablet, Rfl: 11     lisinopril (ZESTRIL) 10 MG tablet, Take 1 tablet (10 mg) by mouth daily, Disp: 30 tablet, Rfl: 11     MULTIVITAMINS OR TABS, 1 TABLET ORALLY DAILY, Disp: 100, Rfl: 3     omeprazole (PRILOSEC) 20 MG DR capsule, Take 1 capsule (20 mg) by mouth daily, Disp: 90 capsule, Rfl: 3     Probiotic Product (PRO-BIOTIC BLEND) CAPS, Take 1 capsule by mouth daily, Disp: , Rfl:     Patient Active Problem List   Diagnosis     Other tenosynovitis of hand and wrist     Degeneration of lumbar or lumbosacral intervertebral disc     Esophageal reflux     Plantar fasciitis     Hypertension     CARDIOVASCULAR SCREENING; LDL GOAL LESS THAN 130     Hypertriglyceridemia     Prediabetes     Situational anxiety     (I10) Essential hypertension  Comment:   Plan: lisinopril (ZESTRIL) 20 MG tablet        We will increase the dose of Lisinopril from 10 mg to 20 mg daily. Stay on the same 100 mg daily dose of the Atenolol, and that is likely   The max dose as your pulse is 56. We want that over 50 per minute. Use the non drug therapies. Call if any side effects.   Recheck in 2-3 weeks with a phone visit if doing well.     Phone call duration:  12 minutes    Kirk Oliver MD

## 2020-04-06 NOTE — PATIENT INSTRUCTIONS
Thank you for choosing [facility].  You may be receiving an email and/or telephone survey request from [facility] regarding your visit today.  Please take a few minutes to respond to the survey to let us know how we are doing.      If you have questions or concerns, please contact us via Digital Ally or you can contact your care team at [phone].    Our Clinic hours are:  Monday 6:40 am  to 7:00 pm  Tuesday -Friday 6:40 am to 5:00 pm    The [place] outpatient lab hours are:  Monday - Friday 6:10 am to 4:45 pm  Saturdays 7:00 am to 11:00 am  Appointments are required, call [phone]    If you have clinical questions after hours or would like to schedule an appointment,  call the clinic at [phone].

## 2020-04-27 ENCOUNTER — VIRTUAL VISIT (OUTPATIENT)
Dept: FAMILY MEDICINE | Facility: CLINIC | Age: 70
End: 2020-04-27
Payer: MEDICARE

## 2020-04-27 DIAGNOSIS — I10 ESSENTIAL HYPERTENSION: Primary | ICD-10-CM

## 2020-04-27 DIAGNOSIS — Z12.11 SPECIAL SCREENING FOR MALIGNANT NEOPLASMS, COLON: ICD-10-CM

## 2020-04-27 PROCEDURE — 99442 ZZC PHYSICIAN TELEPHONE EVALUATION 11-20 MIN: CPT | Performed by: FAMILY MEDICINE

## 2020-04-27 NOTE — PATIENT INSTRUCTIONS
Thank you for choosing [facility].  You may be receiving an email and/or telephone survey request from [facility] Customer Experience regarding your visit today.  Please take a few minutes to respond to the survey to let us know how we are doing.      If you have questions or concerns, please contact us via [employer] or you can contact your care team at [phone].    Our Clinic hours are:  Monday 6:40 am  to 7:00 pm  Tuesday -Friday 6:40 am to 5:00 pm    The [place] outpatient lab hours are:  Monday - Friday 6:10 am to 4:45 pm  Saturdays 7:00 am to 11:00 am  Appointments are required, call [phone]    If you have clinical questions after hours or would like to schedule an appointment,  call the clinic at [phone].

## 2020-04-27 NOTE — PROGRESS NOTES
"Willian Reid is a 70 year old male who is being evaluated via a billable telephone visit.      The patient has been notified of following:     \"This telephone visit will be conducted via a call between you and your physician/provider. We have found that certain health care needs can be provided without the need for a physical exam.  This service lets us provide the care you need with a short phone conversation.  If a prescription is necessary we can send it directly to your pharmacy.  If lab work is needed we can place an order for that and you can then stop by our lab to have the test done at a later time.    Telephone visits are billed at different rates depending on your insurance coverage. During this emergency period, for some insurers they may be billed the same as an in-person visit.  Please reach out to your insurance provider with any questions.    If during the course of the call the physician/provider feels a telephone visit is not appropriate, you will not be charged for this service.\"    Patient has given verbal consent for Telephone visit?  Yes    How would you like to obtain your AVS?     Subjective     Willian Reid is a 70 year old male who presents to clinic today for the following health issues:    HPI  Hypertension Follow-up      Do you check your blood pressure regularly outside of the clinic? Yes     Are you following a low salt diet? Yes    Are your blood pressures ever more than 140 on the top number (systolic) OR more   than 90 on the bottom number (diastolic), for example 140/90? No, His average reading is 121/80, 54.  He states the pulse has not been below 50.      How many servings of fruits and vegetables do you eat daily?  1 serving.     On average, how many sweetened beverages do you drink each day (Examples: soda, juice, sweet tea, etc.  Do NOT count diet or artificially sweetened beverages)?   0    How many days per week do you exercise enough to make your heart beat faster? " 7    How many minutes a day do you exercise enough to make your heart beat faster? Few hours per day.    How many days per week do you miss taking your medication? 0      Current Outpatient Medications:      albuterol (PROAIR HFA/PROVENTIL HFA/VENTOLIN HFA) 108 (90 Base) MCG/ACT inhaler, Inhale 2 puffs into the lungs every 4 hours as needed for shortness of breath / dyspnea or wheezing, Disp: 1 Inhaler, Rfl: 11     aspirin 81 MG tablet, Take 81 mg by mouth daily , Disp: , Rfl:      atenolol (TENORMIN) 100 MG tablet, Take 1 tablet (100 mg) by mouth daily, Disp: 30 tablet, Rfl: 11     lisinopril (ZESTRIL) 20 MG tablet, Take 1 tablet (20 mg) by mouth daily, Disp: 30 tablet, Rfl: 11     MULTIVITAMINS OR TABS, 1 TABLET ORALLY DAILY, Disp: 100, Rfl: 3     omeprazole (PRILOSEC) 20 MG DR capsule, Take 1 capsule (20 mg) by mouth daily, Disp: 90 capsule, Rfl: 3     Probiotic Product (PRO-BIOTIC BLEND) CAPS, Take 1 capsule by mouth daily, Disp: , Rfl:     Patient Active Problem List   Diagnosis     Other tenosynovitis of hand and wrist     Degeneration of lumbar or lumbosacral intervertebral disc     Esophageal reflux     Plantar fasciitis     Hypertension     CARDIOVASCULAR SCREENING; LDL GOAL LESS THAN 130     Hypertriglyceridemia     Prediabetes     Situational anxiety       (I10) Essential hypertension  (primary encounter diagnosis)  Comment:   Plan: **Creatinine FUTURE anytime, **Potassium FUTURE        anytime        You are doing well. Stay on the same med and use the non drug therapies.   The goal for the average is under 130/80. Refills are done. Call for the lab appt. These will be notified.     (Z12.11) Special screening for malignant neoplasms, colon  Comment:   Plan: COLORECTAL SURGERY REFERRAL        Call to schedule. Instructions discussed and will be mailed.     Kirk Oliver MD      Phone call duration: 14  minutes

## 2020-04-28 DIAGNOSIS — I10 ESSENTIAL HYPERTENSION: ICD-10-CM

## 2020-04-28 LAB
CREAT SERPL-MCNC: 0.91 MG/DL (ref 0.66–1.25)
GFR SERPL CREATININE-BSD FRML MDRD: 84 ML/MIN/{1.73_M2}
POTASSIUM SERPL-SCNC: 4.8 MMOL/L (ref 3.4–5.3)

## 2020-04-28 PROCEDURE — 36415 COLL VENOUS BLD VENIPUNCTURE: CPT | Performed by: FAMILY MEDICINE

## 2020-04-28 PROCEDURE — 84132 ASSAY OF SERUM POTASSIUM: CPT | Performed by: FAMILY MEDICINE

## 2020-04-28 PROCEDURE — 82565 ASSAY OF CREATININE: CPT | Performed by: FAMILY MEDICINE

## 2020-09-29 ENCOUNTER — IMMUNIZATION (OUTPATIENT)
Dept: FAMILY MEDICINE | Facility: CLINIC | Age: 70
End: 2020-09-29
Payer: MEDICARE

## 2020-09-29 DIAGNOSIS — Z23 NEED FOR PROPHYLACTIC VACCINATION AND INOCULATION AGAINST INFLUENZA: Primary | ICD-10-CM

## 2020-09-29 PROCEDURE — G0008 ADMIN INFLUENZA VIRUS VAC: HCPCS

## 2020-09-29 PROCEDURE — 90662 IIV NO PRSV INCREASED AG IM: CPT

## 2020-09-29 PROCEDURE — 99207 ZZC NO CHARGE NURSE ONLY: CPT

## 2020-12-18 ENCOUNTER — OFFICE VISIT (OUTPATIENT)
Dept: FAMILY MEDICINE | Facility: CLINIC | Age: 70
End: 2020-12-18
Payer: MEDICARE

## 2020-12-18 VITALS
HEART RATE: 60 BPM | BODY MASS INDEX: 25.71 KG/M2 | OXYGEN SATURATION: 98 % | DIASTOLIC BLOOD PRESSURE: 98 MMHG | WEIGHT: 160 LBS | HEIGHT: 66 IN | TEMPERATURE: 97.3 F | SYSTOLIC BLOOD PRESSURE: 146 MMHG | RESPIRATION RATE: 18 BRPM

## 2020-12-18 DIAGNOSIS — Z12.12 SCREENING FOR MALIGNANT NEOPLASM OF THE RECTUM: Primary | ICD-10-CM

## 2020-12-18 DIAGNOSIS — K21.9 GASTROESOPHAGEAL REFLUX DISEASE WITHOUT ESOPHAGITIS: ICD-10-CM

## 2020-12-18 DIAGNOSIS — I10 ESSENTIAL HYPERTENSION: ICD-10-CM

## 2020-12-18 DIAGNOSIS — J20.9 BRONCHITIS WITH BRONCHOSPASM: ICD-10-CM

## 2020-12-18 LAB
CREAT SERPL-MCNC: 1.13 MG/DL (ref 0.66–1.25)
GFR SERPL CREATININE-BSD FRML MDRD: 64 ML/MIN/{1.73_M2}
POTASSIUM SERPL-SCNC: 4.4 MMOL/L (ref 3.4–5.3)

## 2020-12-18 PROCEDURE — 99214 OFFICE O/P EST MOD 30 MIN: CPT | Performed by: FAMILY MEDICINE

## 2020-12-18 PROCEDURE — 36415 COLL VENOUS BLD VENIPUNCTURE: CPT | Performed by: FAMILY MEDICINE

## 2020-12-18 PROCEDURE — 82565 ASSAY OF CREATININE: CPT | Performed by: FAMILY MEDICINE

## 2020-12-18 PROCEDURE — 84132 ASSAY OF SERUM POTASSIUM: CPT | Performed by: FAMILY MEDICINE

## 2020-12-18 RX ORDER — ALBUTEROL SULFATE 90 UG/1
2 AEROSOL, METERED RESPIRATORY (INHALATION) EVERY 4 HOURS PRN
Qty: 1 INHALER | Refills: 11 | Status: SHIPPED | OUTPATIENT
Start: 2020-12-18 | End: 2021-11-10

## 2020-12-18 RX ORDER — ATENOLOL 100 MG/1
100 TABLET ORAL DAILY
Qty: 90 TABLET | Refills: 3 | Status: SHIPPED | OUTPATIENT
Start: 2020-12-18 | End: 2021-11-10

## 2020-12-18 RX ORDER — LISINOPRIL 20 MG/1
20 TABLET ORAL DAILY
Qty: 90 TABLET | Refills: 3 | Status: SHIPPED | OUTPATIENT
Start: 2020-12-18 | End: 2021-11-10 | Stop reason: SINTOL

## 2020-12-18 ASSESSMENT — MIFFLIN-ST. JEOR: SCORE: 1410.57

## 2020-12-18 NOTE — PROGRESS NOTES
Subjective     Willian Reid is a 70 year old male who presents to clinic today for the following health issues:    HPI         Hypertension Follow-up      Do you check your blood pressure regularly outside of the clinic? Yes     Are you following a low salt diet? Yes    Are your blood pressures ever more than 140 on the top number (systolic) OR more   than 90 on the bottom number (diastolic), for example 140/90? No, some of his readings are:  125/76, 130/80, 129/80, 114/80, 123/78.  His average reading is 124/79, 61.      How many servings of fruits and vegetables do you eat daily?  1 serving per day.    On average, how many sweetened beverages do you drink each day (Examples: soda, juice, sweet tea, etc.  Do NOT count diet or artificially sweetened beverages)?   0    How many days per week do you exercise enough to make your heart beat faster? 7    How many minutes a day do you exercise enough to make your heart beat faster? 60 or more    How many days per week do you miss taking your medication? 0    Medication Followup of Reflux    Taking Medication as prescribed: yes    Side Effects:  None    Medication Helping Symptoms:  yes       BRONCHITIS:  Refill of the Proair inhaler.  He will use this every few months.    COLON CANCER SCREENING:  He will do the FIT test.  This is currenlty on backorder.  We can call the patient when the kit becomes available.    Current Outpatient Medications   Medication Instructions     albuterol (PROAIR HFA/PROVENTIL HFA/VENTOLIN HFA) 108 (90 Base) MCG/ACT inhaler 2 puffs, Inhalation, EVERY 4 HOURS PRN     aspirin (ASA) 81 mg, Oral, DAILY     atenolol (TENORMIN) 100 mg, Oral, DAILY     lisinopril (ZESTRIL) 20 mg, Oral, DAILY     MULTIVITAMINS OR TABS 1 TABLET ORALLY DAILY     omeprazole (PRILOSEC) 20 mg, Oral, DAILY     Probiotic Product (PRO-BIOTIC BLEND) CAPS 1 capsule, Oral, DAILY       Patient Active Problem List   Diagnosis     Other tenosynovitis of hand and wrist      "Degeneration of lumbar or lumbosacral intervertebral disc     Esophageal reflux     Plantar fasciitis     Hypertension     CARDIOVASCULAR SCREENING; LDL GOAL LESS THAN 130     Hypertriglyceridemia     Prediabetes     Situational anxiety       Blood pressure (!) 146/98, pulse 60, temperature 97.3  F (36.3  C), temperature source Tympanic, resp. rate 18, height 1.664 m (5' 5.5\"), weight 72.6 kg (160 lb), SpO2 98 %.    Exam:  GENERAL APPEARANCE: healthy, alert and no distress  EYES: EOMI,  PERRL  NECK: no adenopathy, no asymmetry, masses, or scars and thyroid normal to palpation  RESP: lungs clear to auscultation - no rales, rhonchi or wheezes  CV: regular rates and rhythm, normal S1 S2, no S3 or S4 and no murmur, click or rub -  MS: extremities normal- no gross deformities noted, no evidence of inflammation in joints, FROM in all extremities.  SKIN: no suspicious lesions or rashes  PSYCH: mentation appears normal and affect normal/bright      (J20.9) Bronchitis with bronchospasm  Comment:   Plan: albuterol (PROAIR HFA/PROVENTIL HFA/VENTOLIN         HFA) 108 (90 Base) MCG/ACT inhaler        Use this as needed and refills are done. Identify triggers to avoid.     (I10) Essential hypertension  Comment:   Plan: atenolol (TENORMIN) 100 MG tablet, lisinopril         (ZESTRIL) 20 MG tablet, Creatinine, Potassium        Monitor and record the BP at rest and the goal for the average is under 130/80.   Use the meds and the non drug therapies.     (K21.9) Gastroesophageal reflux disease without esophagitis  Comment:   Plan: omeprazole (PRILOSEC) 20 MG DR capsule        Use the med daily and the dietary and mechanical instructions .  Refills are done. The goal for the symptoms is to be zero.     (Z12.12) Screening for malignant neoplasm of the rectum  )  Comment:   Plan: Fecal colorectal cancer screen (FIT)        This will be available after 1-1-21. Call sometime to get this.       Kirk Oliver MD          "

## 2020-12-18 NOTE — LETTER
December 21, 2020      Willian Reid  [address] [place] [id number]        Dear ,    We are writing to inform you of your test results.    Your test results fall within the expected range(s)/normal.    Resulted Orders   Creatinine   Result Value Ref Range    Creatinine 1.13 0.66 - 1.25 mg/dL    GFR Estimate 64 >60 mL/min/[1.73_m2]      Comment:      Non  GFR Calc  Starting 12/18/2018, serum creatinine based estimated GFR (eGFR) will be   calculated using the Chronic Kidney Disease Epidemiology Collaboration   (CKD-EPI) equation.      GFR Estimate If Black 74 >60 mL/min/[1.73_m2]      Comment:       GFR Calc  Starting 12/18/2018, serum creatinine based estimated GFR (eGFR) will be   calculated using the Chronic Kidney Disease Epidemiology Collaboration   (CKD-EPI) equation.     Potassium   Result Value Ref Range    Potassium 4.4 3.4 - 5.3 mmol/L       If you have any questions or concerns, please call the clinic at the number listed above.       Sincerely,      Kirk Oliver MD

## 2020-12-18 NOTE — PATIENT INSTRUCTIONS
Thank you for choosing [facility].  You may be receiving an email and/or telephone survey request from [facility] Customer Experience regarding your visit today.  Please take a few minutes to respond to the survey to let us know how we are doing.      If you have questions or concerns, please contact us via [place] or you can contact your care team at [phone].    Our Clinic hours are:  Monday 6:40 am  to 7:00 pm  Tuesday -Friday 6:40 am to 5:00 pm    The [place] outpatient lab hours are:  Monday - Friday 6:10 am to 4:45 pm  Saturdays 7:00 am to 11:00 am  Appointments are required, call [phone]    If you have clinical questions after hours or would like to schedule an appointment,  call the clinic at [phone].    (J20.9) Bronchitis with bronchospasm  Comment:   Plan: albuterol (PROAIR HFA/PROVENTIL HFA/VENTOLIN         HFA) 108 (90 Base) MCG/ACT inhaler        Use this as needed and refills are done. Identify triggers to avoid.     (I10) Essential hypertension  Comment:   Plan: atenolol (TENORMIN) 100 MG tablet, lisinopril         (ZESTRIL) 20 MG tablet, Creatinine, Potassium        Monitor and record the BP at rest and the goal for the average is under 130/80.   Use the meds and the non drug therapies.     (K21.9) Gastroesophageal reflux disease without esophagitis  Comment:   Plan: omeprazole (PRILOSEC) 20 MG DR capsule        Use the med daily and the dietary and mechanical instructions .  Refills are done. The goal for the symptoms is to be zero.     (Z12.12) Screening for malignant neoplasm of the rectum  )  Comment:   Plan: Fecal colorectal cancer screen (FIT)        This will be available after 1-1-21. Call sometime to get this.    I have reviewed and confirmed nurses' notes for patient's medications, allergies, medical history, and surgical history.

## 2021-02-17 ENCOUNTER — MYC MEDICAL ADVICE (OUTPATIENT)
Dept: FAMILY MEDICINE | Facility: CLINIC | Age: 71
End: 2021-02-17

## 2021-02-24 DIAGNOSIS — Z12.12 SCREENING FOR MALIGNANT NEOPLASM OF THE RECTUM: ICD-10-CM

## 2021-02-24 LAB — HEMOCCULT STL QL IA: NEGATIVE

## 2021-02-24 PROCEDURE — 82274 ASSAY TEST FOR BLOOD FECAL: CPT | Performed by: FAMILY MEDICINE

## 2021-04-03 ENCOUNTER — HEALTH MAINTENANCE LETTER (OUTPATIENT)
Age: 71
End: 2021-04-03

## 2021-09-18 ENCOUNTER — HEALTH MAINTENANCE LETTER (OUTPATIENT)
Age: 71
End: 2021-09-18

## 2021-09-29 ENCOUNTER — IMMUNIZATION (OUTPATIENT)
Dept: FAMILY MEDICINE | Facility: CLINIC | Age: 71
End: 2021-09-29
Payer: MEDICARE

## 2021-09-29 DIAGNOSIS — Z23 NEED FOR PROPHYLACTIC VACCINATION AND INOCULATION AGAINST INFLUENZA: Primary | ICD-10-CM

## 2021-09-29 PROCEDURE — 99207 PR NO CHARGE NURSE ONLY: CPT

## 2021-09-29 PROCEDURE — G0008 ADMIN INFLUENZA VIRUS VAC: HCPCS

## 2021-09-29 PROCEDURE — 90662 IIV NO PRSV INCREASED AG IM: CPT

## 2021-11-09 NOTE — PATIENT INSTRUCTIONS
Patient Education   Personalized Prevention Plan  You are due for the preventive services outlined below.  Your care team is available to assist you in scheduling these services.  If you have already completed any of these items, please share that information with your care team to update in your medical record.  Health Maintenance Due   Topic Date Due     ANNUAL REVIEW OF HM ORDERS  Never done     Hepatitis C Screening  Never done     Zoster (Shingles) Vaccine (1 of 2) Never done     AORTIC ANEURYSM SCREENING (SYSTEM ASSIGNED)  Never done     Annual Wellness Visit  03/02/2017     PHQ-2  01/01/2021     FALL RISK ASSESSMENT  02/10/2021        Patient Education   Personalized Prevention Plan  You are due for the preventive services outlined below.  Your care team is available to assist you in scheduling these services.  If you have already completed any of these items, please share that information with your care team to update in your medical record.  Health Maintenance Due   Topic Date Due     ANNUAL REVIEW OF HM ORDERS  Never done     Hepatitis C Screening  Never done     Zoster (Shingles) Vaccine (1 of 2) Never done     AORTIC ANEURYSM SCREENING (SYSTEM ASSIGNED)  Never done     PHQ-2  01/01/2021     FALL RISK ASSESSMENT  02/10/2021             Hypertension:  1. In place of lisinopril, use losartan 50 mg daily  2. If blood pressure is not well controlled, can make adjustments  3. Cough should resolve in 1-2 weeks off lisinopril  4. Ok to stop aspirin     Health Care Maintenance:   1. Keep an eye on memory, sounds more related to anxiety or brain distraction  2. Radiology test was ordered - ultrasound AAA.  Please call [phone] to schedule.    GERD/Heartburn:  1. Recommend to take the omeprazole 20 mg every other day x 2 weeks, then twice a week for 2 weeks, then stop  2. You may have mild rebound reflux for a few days  3. If you have severe reflux immediately after stopping the med, then would Rx for  Famotidine 20 mg daily in place of omeprazole  4. If several months down the road you develop more reflux, would use the omeprazole 20 mg daily x 2-4 weeks then stop

## 2021-11-09 NOTE — PROGRESS NOTES
"SUBJECTIVE:   Willian Reid is a 71 year old male who presents for Preventive Visit.    0 words recalled on mini cog     Patient has been advised of split billing requirements and indicates understanding: Yes   Are you in the first 12 months of your Medicare coverage?  No    Healthy Habits:     In general, how would you rate your overall health?  Good    Frequency of exercise:  6-7 days/week    Duration of exercise:  45-60 minutes    Do you usually eat at least 4 servings of fruit and vegetables a day, include whole grains    & fiber and avoid regularly eating high fat or \"junk\" foods?  Yes    Taking medications regularly:  Yes    Barriers to taking medications:  Not applicable    Medication side effects:  None, No muscle aches and No significant flushing    Ability to successfully perform activities of daily living:  No assistance needed    Home Safety:  No safety concerns identified    Hearing Impairment:  No hearing concerns    In the past 6 months, have you been bothered by leaking of urine?  No    In general, how would you rate your overall mental or emotional health?  Good      PHQ-2 Total Score: 0    Additional concerns today:  No       Aortic Aneurysm Screening  Zoster: Advised to check with pharmacy   PHQ2: done   Fall risk: done     Hypertension: needs refills of meds;  Not checking blood pressure at home regularly. Often finds blood pressure is high at doc;  At home, blood pressure 120-130s    Cough: started with higher dose of lisinopril   --has chronic cough in summer that is thought due to allergies.  occ wheeze with this.  --known seasonal allergies; strong family history of asthma    GERD:   --on omeprazole since at least 2005  --has very rare GERD symptoms with diet indiscretion     Tobacco use: while in  [age]      Do you feel safe in your environment? Yes    Have you ever done Advance Care Planning? (For example, a Health Directive, POLST, or a discussion with a medical provider or your " loved ones about your wishes): Yes, advance care planning is on file.       Fall risk  Fallen 2 or more times in the past year?: No  Any fall with injury in the past year?: No    Cognitive Screening   1) Repeat 3 items (Leader, Season, Table)    2) Clock draw: NORMAL  3) 3 item recall: Recalls NO objects   Results: 0 items recalled: PROBABLE COGNITIVE IMPAIRMENT, **INFORM PROVIDER**  --he and wife have noted minor problems with memory  --sometimes problems with recalling names of very familiar people    Mini-CogTM Copyright MAREN Barlow. Licensed by the author for use in [facility]; reprinted with permission (jean@[facility]). All rights reserved.      Do you have sleep apnea, excessive snoring or daytime drowsiness?: no    Reviewed and updated as needed this visit by clinical staff  Tobacco  Allergies  Meds  Problems  Med Hx  Surg Hx  Fam Hx  Soc Hx         Reviewed and updated as needed this visit by Provider     Problems           Social History     Tobacco Use     Smoking status: Never Smoker     Smokeless tobacco: Never Used   Substance Use Topics     Alcohol use: No     If you drink alcohol do you typically have >3 drinks per day or >7 drinks per week? No    Alcohol Use 11/10/2021   Prescreen: >3 drinks/day or >7 drinks/week? Not Applicable   Prescreen: >3 drinks/day or >7 drinks/week? -         Current providers sharing in care for this patient include:   Patient Care Team:  Amy Chandler DO as PCP - General (Internal Medicine)  Kirk Oliver MD as PCP - Kirk Oreilly MD as Assigned PCP    The following health maintenance items are reviewed in Epic and correct as of today:  Health Maintenance Due   Topic Date Due     ANNUAL REVIEW OF  ORDERS  Never done     HEPATITIS C SCREENING  Never done     ZOSTER IMMUNIZATION (1 of 2) Never done     AORTIC ANEURYSM SCREENING (SYSTEM ASSIGNED)  Never done     PHQ-2  01/01/2021     FALL RISK ASSESSMENT  02/10/2021     Current  "Outpatient Medications   Medication Sig Dispense Refill     albuterol (PROAIR HFA/PROVENTIL HFA/VENTOLIN HFA) 108 (90 Base) MCG/ACT inhaler Inhale 2 puffs into the lungs every 4 hours as needed for shortness of breath / dyspnea or wheezing 1 Inhaler 11     aspirin 81 MG tablet Take 81 mg by mouth daily        atenolol (TENORMIN) 100 MG tablet Take 1 tablet (100 mg) by mouth daily 90 tablet 3     lisinopril (ZESTRIL) 20 MG tablet Take 1 tablet (20 mg) by mouth daily 90 tablet 3     MULTIVITAMINS OR TABS 1 TABLET ORALLY DAILY 100 3     omeprazole (PRILOSEC) 20 MG DR capsule Take 1 capsule (20 mg) by mouth daily 90 capsule 3     Probiotic Product (PRO-BIOTIC BLEND) CAPS Take 1 capsule by mouth daily           Review of Systems   Constitutional: Negative for chills and fever.   HENT: Negative for congestion, ear pain, hearing loss and sore throat.    Eyes: Negative for pain and visual disturbance.   Respiratory: Negative for cough and shortness of breath.    Cardiovascular: Negative for chest pain, palpitations and peripheral edema.   Gastrointestinal: Negative for abdominal pain, constipation, diarrhea, heartburn, hematochezia and nausea.   Genitourinary: Negative for dysuria, frequency, genital sores, hematuria, impotence, penile discharge and urgency.   Musculoskeletal: Negative for arthralgias, joint swelling and myalgias.   Skin: Negative for rash.   Neurological: Negative for dizziness, weakness, headaches and paresthesias.   Psychiatric/Behavioral: Negative for mood changes. The patient is nervous/anxious.      Constitutional, HEENT, cardiovascular, pulmonary, GI, , musculoskeletal, neuro, skin, endocrine and psych systems are negative, except as otherwise noted.    OBJECTIVE:   BP (!) 138/90 (BP Location: Right arm, Patient Position: Sitting, Cuff Size: Adult Regular)   Pulse 60   Temp 97.3  F (36.3  C) (Tympanic)   Resp 16   Ht 1.655 m (5' 5.16\")   Wt 72.1 kg (159 lb)   SpO2 98%   BMI 26.33 kg/m   " "Estimated body mass index is 26.33 kg/m  as calculated from the following:    Height as of this encounter: 1.655 m (5' 5.16\").    Weight as of this encounter: 72.1 kg (159 lb).  Physical Exam  GENERAL: healthy, alert and no distress  EYES: Eyes grossly normal to inspection, PERRL and conjunctivae and sclerae normal  HENT: ear canals and TM's normal, nose and mouth without ulcers or lesions  NECK: no adenopathy, no asymmetry, masses, or scars and thyroid normal to palpation  RESP: lungs clear to auscultation - no rales, rhonchi or wheezes  CV: regular rate and rhythm, normal S1 S2, no S3 or S4, no murmur, click or rub, no peripheral edema and peripheral pulses strong  ABDOMEN: soft, nontender, no hepatosplenomegaly, no masses and bowel sounds normal  MS: no gross musculoskeletal defects noted, no edema  SKIN: no suspicious lesions or rashes  NEURO: Normal strength and tone, mentation intact and speech normal  PSYCH: mentation appears normal, affect normal/bright    Diagnostic Test Results:  Labs reviewed in Epic  Results for orders placed or performed in visit on 11/10/21 (from the past 24 hour(s))   Hemoglobin A1c   Result Value Ref Range    Hemoglobin A1C 5.7 (H) 0.0 - 5.6 %     Results for orders placed or performed in visit on 11/10/21   Hemoglobin A1c     Status: Abnormal   Result Value Ref Range    Hemoglobin A1C 5.7 (H) 0.0 - 5.6 %       ASSESSMENT / PLAN:   (Z00.00) Encounter for Medicare annual wellness exam  (primary encounter diagnosis)  Comment:  Plan:    (I10) Essential hypertension  Comment:   Plan: atenolol (TENORMIN) 100 MG tablet, Basic         metabolic panel  (Ca, Cl, CO2, Creat, Gluc, K,         Na, BUN), losartan (COZAAR) 50 MG tablet, US         Abdominal Aorta Imaging, OFFICE/OUTPT         VISIT,EST,LEVL IV        --Change lisinopril losartan due to cough    (K21.9) Gastroesophageal reflux disease without esophagitis  Comment:   Plan: omeprazole (PRILOSEC) 20 MG DR capsule,         OFFICE/OUTPT " "VISIT,EST,LEVL IV        --Educated about long-term side effects.  Advised to wean off to prevent rebound reflux.  Ideally would only use the omeprazole episodically, 1-2 times per year.  If he has intractable reflux off the omeprazole, would start famotidine 20 mg daily    (J30.2) Seasonal allergic rhinitis, unspecified trigger  Comment:   Plan: albuterol (PROAIR HFA/PROVENTIL HFA/VENTOLIN         HFA) 108 (90 Base) MCG/ACT inhaler,         OFFICE/OUTPT VISIT,EST,LEVL IV         - stable, refill provided    (R73.03) Prediabetes  Comment:   Plan: Hemoglobin A1c, OFFICE/OUTPT VISIT,EST,LEVL IV            (Z13.6) CARDIOVASCULAR SCREENING; LDL GOAL LESS THAN 100  Comment:   Plan: Lipid panel reflex to direct LDL Fasting            (Z13.6) Screening for AAA (abdominal aortic aneurysm)  Comment:   Plan: US Abdominal Aorta Imaging              Patient has been advised of split billing requirements and indicates understanding: Yes  COUNSELING:  Reviewed preventive health counseling, as reflected in patient instructions    Estimated body mass index is 26.33 kg/m  as calculated from the following:    Height as of this encounter: 1.655 m (5' 5.16\").    Weight as of this encounter: 72.1 kg (159 lb).        He reports that he has never smoked. He has never used smokeless tobacco.      Appropriate preventive services were discussed with this patient, including applicable screening as appropriate for cardiovascular disease, diabetes, osteopenia/osteoporosis, and glaucoma.  As appropriate for age/gender, discussed screening for colorectal cancer, prostate cancer, breast cancer, and cervical cancer. Checklist reviewing preventive services available has been given to the patient.    Reviewed patients plan of care and provided an AVS. The Complex Care Plan (for patients with higher acuity and needing more deliberate coordination of services) for Willian meets the Care Plan requirement. This Care Plan has been established and reviewed " with the Patient.    Counseling Resources:  ATP IV Guidelines  Pooled Cohorts Equation Calculator  Breast Cancer Risk Calculator  Breast Cancer: Medication to Reduce Risk  FRAX Risk Assessment  ICSI Preventive Guidelines  Dietary Guidelines for Americans, 2010  USDA's MyPlate  ASA Prophylaxis  Lung CA Screening    Amy Chandler DO  [facility]    Identified Health Risks:

## 2021-11-10 ENCOUNTER — OFFICE VISIT (OUTPATIENT)
Dept: FAMILY MEDICINE | Facility: CLINIC | Age: 71
End: 2021-11-10
Payer: MEDICARE

## 2021-11-10 VITALS
OXYGEN SATURATION: 98 % | RESPIRATION RATE: 16 BRPM | TEMPERATURE: 97.3 F | BODY MASS INDEX: 26.49 KG/M2 | HEIGHT: 65 IN | DIASTOLIC BLOOD PRESSURE: 90 MMHG | WEIGHT: 159 LBS | HEART RATE: 60 BPM | SYSTOLIC BLOOD PRESSURE: 138 MMHG

## 2021-11-10 DIAGNOSIS — Z13.6 SCREENING FOR AAA (ABDOMINAL AORTIC ANEURYSM): ICD-10-CM

## 2021-11-10 DIAGNOSIS — I10 ESSENTIAL HYPERTENSION: ICD-10-CM

## 2021-11-10 DIAGNOSIS — Z13.6 CARDIOVASCULAR SCREENING; LDL GOAL LESS THAN 100: ICD-10-CM

## 2021-11-10 DIAGNOSIS — K21.9 GASTROESOPHAGEAL REFLUX DISEASE WITHOUT ESOPHAGITIS: ICD-10-CM

## 2021-11-10 DIAGNOSIS — R73.03 PREDIABETES: ICD-10-CM

## 2021-11-10 DIAGNOSIS — Z00.00 ENCOUNTER FOR MEDICARE ANNUAL WELLNESS EXAM: Primary | ICD-10-CM

## 2021-11-10 DIAGNOSIS — J30.2 SEASONAL ALLERGIC RHINITIS, UNSPECIFIED TRIGGER: ICD-10-CM

## 2021-11-10 LAB
ANION GAP SERPL CALCULATED.3IONS-SCNC: 3 MMOL/L (ref 3–14)
BUN SERPL-MCNC: 19 MG/DL (ref 7–30)
CALCIUM SERPL-MCNC: 10 MG/DL (ref 8.5–10.1)
CHLORIDE BLD-SCNC: 107 MMOL/L (ref 94–109)
CHOLEST SERPL-MCNC: 190 MG/DL
CO2 SERPL-SCNC: 28 MMOL/L (ref 20–32)
CREAT SERPL-MCNC: 1.04 MG/DL (ref 0.66–1.25)
FASTING STATUS PATIENT QL REPORTED: YES
GFR SERPL CREATININE-BSD FRML MDRD: 71 ML/MIN/1.73M2
GLUCOSE BLD-MCNC: 95 MG/DL (ref 70–99)
HBA1C MFR BLD: 5.7 % (ref 0–5.6)
HDLC SERPL-MCNC: 50 MG/DL
LDLC SERPL CALC-MCNC: 98 MG/DL
NONHDLC SERPL-MCNC: 140 MG/DL
POTASSIUM BLD-SCNC: 4.7 MMOL/L (ref 3.4–5.3)
SODIUM SERPL-SCNC: 138 MMOL/L (ref 133–144)
TRIGL SERPL-MCNC: 209 MG/DL

## 2021-11-10 PROCEDURE — 80048 BASIC METABOLIC PNL TOTAL CA: CPT | Performed by: INTERNAL MEDICINE

## 2021-11-10 PROCEDURE — 80061 LIPID PANEL: CPT | Performed by: INTERNAL MEDICINE

## 2021-11-10 PROCEDURE — 83036 HEMOGLOBIN GLYCOSYLATED A1C: CPT | Performed by: INTERNAL MEDICINE

## 2021-11-10 PROCEDURE — 99214 OFFICE O/P EST MOD 30 MIN: CPT | Mod: 25 | Performed by: INTERNAL MEDICINE

## 2021-11-10 PROCEDURE — 36415 COLL VENOUS BLD VENIPUNCTURE: CPT | Performed by: INTERNAL MEDICINE

## 2021-11-10 PROCEDURE — G0439 PPPS, SUBSEQ VISIT: HCPCS | Performed by: INTERNAL MEDICINE

## 2021-11-10 RX ORDER — LISINOPRIL 20 MG/1
20 TABLET ORAL DAILY
Qty: 90 TABLET | Refills: 3 | Status: CANCELLED | OUTPATIENT
Start: 2021-11-10

## 2021-11-10 RX ORDER — LOSARTAN POTASSIUM 50 MG/1
50 TABLET ORAL DAILY
Qty: 90 TABLET | Refills: 3 | Status: SHIPPED | OUTPATIENT
Start: 2021-11-10 | End: 2022-11-02

## 2021-11-10 RX ORDER — ALBUTEROL SULFATE 90 UG/1
2 AEROSOL, METERED RESPIRATORY (INHALATION) EVERY 4 HOURS PRN
Qty: 18 G | Refills: 11 | Status: SHIPPED | OUTPATIENT
Start: 2021-11-10 | End: 2022-12-30

## 2021-11-10 RX ORDER — ATENOLOL 100 MG/1
100 TABLET ORAL DAILY
Qty: 90 TABLET | Refills: 3 | Status: SHIPPED | OUTPATIENT
Start: 2021-11-10 | End: 2022-12-30

## 2021-11-10 SDOH — ECONOMIC STABILITY: INCOME INSECURITY: IN THE LAST 12 MONTHS, WAS THERE A TIME WHEN YOU WERE NOT ABLE TO PAY THE MORTGAGE OR RENT ON TIME?: NO

## 2021-11-10 SDOH — ECONOMIC STABILITY: TRANSPORTATION INSECURITY
IN THE PAST 12 MONTHS, HAS LACK OF TRANSPORTATION KEPT YOU FROM MEETINGS, WORK, OR FROM GETTING THINGS NEEDED FOR DAILY LIVING?: NO

## 2021-11-10 SDOH — HEALTH STABILITY: PHYSICAL HEALTH: ON AVERAGE, HOW MANY MINUTES DO YOU ENGAGE IN EXERCISE AT THIS LEVEL?: 60 MIN

## 2021-11-10 SDOH — ECONOMIC STABILITY: INCOME INSECURITY: HOW HARD IS IT FOR YOU TO PAY FOR THE VERY BASICS LIKE FOOD, HOUSING, MEDICAL CARE, AND HEATING?: NOT HARD AT ALL

## 2021-11-10 SDOH — ECONOMIC STABILITY: FOOD INSECURITY: WITHIN THE PAST 12 MONTHS, YOU WORRIED THAT YOUR FOOD WOULD RUN OUT BEFORE YOU GOT MONEY TO BUY MORE.: NEVER TRUE

## 2021-11-10 SDOH — ECONOMIC STABILITY: TRANSPORTATION INSECURITY
IN THE PAST 12 MONTHS, HAS THE LACK OF TRANSPORTATION KEPT YOU FROM MEDICAL APPOINTMENTS OR FROM GETTING MEDICATIONS?: NO

## 2021-11-10 SDOH — ECONOMIC STABILITY: FOOD INSECURITY: WITHIN THE PAST 12 MONTHS, THE FOOD YOU BOUGHT JUST DIDN'T LAST AND YOU DIDN'T HAVE MONEY TO GET MORE.: NEVER TRUE

## 2021-11-10 SDOH — HEALTH STABILITY: PHYSICAL HEALTH: ON AVERAGE, HOW MANY DAYS PER WEEK DO YOU ENGAGE IN MODERATE TO STRENUOUS EXERCISE (LIKE A BRISK WALK)?: 7 DAYS

## 2021-11-10 ASSESSMENT — ENCOUNTER SYMPTOMS
DIZZINESS: 0
ABDOMINAL PAIN: 0
CHILLS: 0
PALPITATIONS: 0
HEMATURIA: 0
NAUSEA: 0
WEAKNESS: 0
FREQUENCY: 0
HEARTBURN: 0
SORE THROAT: 0
HEADACHES: 0
SHORTNESS OF BREATH: 0
PARESTHESIAS: 0
EYE PAIN: 0
CONSTIPATION: 0
JOINT SWELLING: 0
DYSURIA: 0
COUGH: 0
MYALGIAS: 0
NERVOUS/ANXIOUS: 1
HEMATOCHEZIA: 0
ARTHRALGIAS: 0
DIARRHEA: 0
FEVER: 0

## 2021-11-10 ASSESSMENT — PAIN SCALES - GENERAL: PAINLEVEL: NO PAIN (0)

## 2021-11-10 ASSESSMENT — SOCIAL DETERMINANTS OF HEALTH (SDOH)
IN A TYPICAL WEEK, HOW MANY TIMES DO YOU TALK ON THE PHONE WITH FAMILY, FRIENDS, OR NEIGHBORS?: MORE THAN THREE TIMES A WEEK
HOW OFTEN DO YOU GET TOGETHER WITH FRIENDS OR RELATIVES?: THREE TIMES A WEEK
DO YOU BELONG TO ANY CLUBS OR ORGANIZATIONS SUCH AS CHURCH GROUPS UNIONS, FRATERNAL OR ATHLETIC GROUPS, OR SCHOOL GROUPS?: YES
HOW OFTEN DO YOU ATTEND CHURCH OR RELIGIOUS SERVICES?: MORE THAN 4 TIMES PER YEAR

## 2021-11-10 ASSESSMENT — LIFESTYLE VARIABLES
HOW OFTEN DO YOU HAVE SIX OR MORE DRINKS ON ONE OCCASION: NEVER
HOW OFTEN DO YOU HAVE A DRINK CONTAINING ALCOHOL: NEVER

## 2021-11-10 ASSESSMENT — MIFFLIN-ST. JEOR: SCORE: 1395.59

## 2021-11-10 ASSESSMENT — ACTIVITIES OF DAILY LIVING (ADL): CURRENT_FUNCTION: NO ASSISTANCE NEEDED

## 2021-11-10 NOTE — RESULT ENCOUNTER NOTE
Cholesterol is similar to 1 year ago    Kidney function, blood sugar, electrolytes are normal.    A1c is in the pre-diabetic range.  We will recheck in 1 year.  Work on healthy diet and exercise to lower blood sugar

## 2021-11-20 ENCOUNTER — HOSPITAL ENCOUNTER (OUTPATIENT)
Dept: ULTRASOUND IMAGING | Facility: CLINIC | Age: 71
Discharge: HOME | End: 2021-11-20
Attending: INTERNAL MEDICINE | Admitting: INTERNAL MEDICINE
Payer: MEDICARE

## 2021-11-20 DIAGNOSIS — I10 ESSENTIAL HYPERTENSION: ICD-10-CM

## 2021-11-20 DIAGNOSIS — Z13.6 SCREENING FOR AAA (ABDOMINAL AORTIC ANEURYSM): ICD-10-CM

## 2021-11-20 PROCEDURE — 76775 US EXAM ABDO BACK WALL LIM: CPT

## 2021-11-22 NOTE — RESULT ENCOUNTER NOTE
The ultrasound is negative for aneurysm.  There is a small amount of plaque in the aorta.  Your cholesterol is already controlled except the triglycerides are a bit elevated.  Your blood sugar is just slightly high in the pre-diabetic range.  Your blood pressure is reasonably well controlled.  These are the main risk factors for plaque formation.  Keep working on healthy diet and regular physical exercise.

## 2021-12-15 ENCOUNTER — MYC MEDICAL ADVICE (OUTPATIENT)
Dept: FAMILY MEDICINE | Facility: CLINIC | Age: 71
End: 2021-12-15
Payer: MEDICARE

## 2021-12-15 DIAGNOSIS — K21.9 GASTROESOPHAGEAL REFLUX DISEASE WITHOUT ESOPHAGITIS: Primary | ICD-10-CM

## 2021-12-15 RX ORDER — FAMOTIDINE 20 MG/1
20 TABLET, FILM COATED ORAL 2 TIMES DAILY
Status: CANCELLED | OUTPATIENT
Start: 2021-12-15

## 2021-12-15 RX ORDER — FAMOTIDINE 20 MG/1
20 TABLET, FILM COATED ORAL DAILY
Qty: 90 TABLET | Refills: 3 | Status: SHIPPED | OUTPATIENT
Start: 2021-12-15 | End: 2022-01-21

## 2021-12-15 NOTE — TELEPHONE ENCOUNTER
Dr. Chandler,    Patient wanting to start famotidine now.  Pended for your approval. Vika ALVES RN

## 2021-12-17 NOTE — TELEPHONE ENCOUNTER
Patient has read [employer] message without further response, so closing encounter.     Aditi Saeed RN  [facility]

## 2022-01-19 ENCOUNTER — MYC MEDICAL ADVICE (OUTPATIENT)
Dept: FAMILY MEDICINE | Facility: CLINIC | Age: 72
End: 2022-01-19
Payer: MEDICARE

## 2022-01-19 DIAGNOSIS — K21.9 GASTROESOPHAGEAL REFLUX DISEASE WITHOUT ESOPHAGITIS: ICD-10-CM

## 2022-01-21 RX ORDER — FAMOTIDINE 40 MG/1
40 TABLET, FILM COATED ORAL DAILY
Qty: 90 TABLET | Refills: 3 | Status: SHIPPED | OUTPATIENT
Start: 2022-01-21 | End: 2022-12-30

## 2022-01-31 DIAGNOSIS — K21.9 GASTROESOPHAGEAL REFLUX DISEASE WITHOUT ESOPHAGITIS: ICD-10-CM

## 2022-04-05 ENCOUNTER — IMMUNIZATION (OUTPATIENT)
Dept: FAMILY MEDICINE | Facility: CLINIC | Age: 72
End: 2022-04-05
Payer: MEDICARE

## 2022-04-05 PROCEDURE — 91306 COVID-19,PF,MODERNA (18+ YRS BOOSTER .25ML): CPT

## 2022-04-05 PROCEDURE — 0064A COVID-19,PF,MODERNA (18+ YRS BOOSTER .25ML): CPT

## 2022-06-02 ENCOUNTER — MYC MEDICAL ADVICE (OUTPATIENT)
Dept: FAMILY MEDICINE | Facility: CLINIC | Age: 72
End: 2022-06-02
Payer: MEDICARE

## 2022-06-02 NOTE — TELEPHONE ENCOUNTER
Panel Management:    Patient's blood pressure was elevated at a recent clinic visit.  It is important to get blood pressure < 140/90        My chart message sent - RN BP check and please mail out FIT test

## 2022-06-07 NOTE — TELEPHONE ENCOUNTER
Patient Quality Outreach    Patient is due for the following:   Hypertension -  BP check  Colon Cancer Screening -  FIT    NEXT STEPS:   Schedule a nurse only visit for BP    Type of outreach:    Phone, left message for patient/parent to call back.  Please mail FIT test when he calls back       Questions for provider review:    None     Bismark Arnett, CMA

## 2022-06-09 ENCOUNTER — APPOINTMENT (OUTPATIENT)
Dept: CT IMAGING | Facility: CLINIC | Age: 72
End: 2022-06-09
Attending: EMERGENCY MEDICINE
Payer: MEDICARE

## 2022-06-09 ENCOUNTER — ANESTHESIA (OUTPATIENT)
Dept: SURGERY | Facility: CLINIC | Age: 72
End: 2022-06-09
Payer: MEDICARE

## 2022-06-09 ENCOUNTER — ANESTHESIA EVENT (OUTPATIENT)
Dept: SURGERY | Facility: CLINIC | Age: 72
End: 2022-06-09
Payer: MEDICARE

## 2022-06-09 ENCOUNTER — APPOINTMENT (OUTPATIENT)
Dept: MRI IMAGING | Facility: CLINIC | Age: 72
End: 2022-06-09
Attending: FAMILY MEDICINE
Payer: MEDICARE

## 2022-06-09 ENCOUNTER — HOSPITAL ENCOUNTER (OUTPATIENT)
Facility: CLINIC | Age: 72
Discharge: HOME | End: 2022-06-10
Attending: EMERGENCY MEDICINE | Admitting: SURGERY
Payer: MEDICARE

## 2022-06-09 DIAGNOSIS — I10 ESSENTIAL HYPERTENSION: ICD-10-CM

## 2022-06-09 DIAGNOSIS — K21.9 GASTROESOPHAGEAL REFLUX DISEASE, UNSPECIFIED WHETHER ESOPHAGITIS PRESENT: ICD-10-CM

## 2022-06-09 DIAGNOSIS — R10.13 ABDOMINAL PAIN, EPIGASTRIC: ICD-10-CM

## 2022-06-09 DIAGNOSIS — R14.0 ABDOMINAL BLOATING: ICD-10-CM

## 2022-06-09 DIAGNOSIS — K81.2 ACUTE CHOLECYSTITIS WITH CHRONIC CHOLECYSTITIS: Primary | ICD-10-CM

## 2022-06-09 DIAGNOSIS — Z90.49 S/P LAPAROSCOPIC CHOLECYSTECTOMY: ICD-10-CM

## 2022-06-09 LAB
ALBUMIN SERPL-MCNC: 4.3 G/DL (ref 3.4–5)
ALP SERPL-CCNC: 92 U/L (ref 40–150)
ALT SERPL W P-5'-P-CCNC: 22 U/L (ref 0–70)
ANION GAP SERPL CALCULATED.3IONS-SCNC: 5 MMOL/L (ref 3–14)
AST SERPL W P-5'-P-CCNC: 29 U/L (ref 0–45)
BASOPHILS # BLD AUTO: 0 10E3/UL (ref 0–0.2)
BASOPHILS NFR BLD AUTO: 0 %
BILIRUB SERPL-MCNC: 0.7 MG/DL (ref 0.2–1.3)
BUN SERPL-MCNC: 16 MG/DL (ref 7–30)
CALCIUM SERPL-MCNC: 9.6 MG/DL (ref 8.5–10.1)
CHLORIDE BLD-SCNC: 104 MMOL/L (ref 94–109)
CO2 SERPL-SCNC: 27 MMOL/L (ref 20–32)
CREAT SERPL-MCNC: 1.02 MG/DL (ref 0.66–1.25)
EOSINOPHIL # BLD AUTO: 0 10E3/UL (ref 0–0.7)
EOSINOPHIL NFR BLD AUTO: 0 %
ERYTHROCYTE [DISTWIDTH] IN BLOOD BY AUTOMATED COUNT: 13.1 % (ref 10–15)
GFR SERPL CREATININE-BSD FRML MDRD: 77 ML/MIN/1.73M2
GLUCOSE BLD-MCNC: 131 MG/DL (ref 70–99)
HCT VFR BLD AUTO: 43.4 % (ref 40–53)
HGB BLD-MCNC: 14.3 G/DL (ref 13.3–17.7)
HOLD SPECIMEN: NORMAL
IMM GRANULOCYTES # BLD: 0.1 10E3/UL
IMM GRANULOCYTES NFR BLD: 0 %
LACTATE SERPL-SCNC: 2.3 MMOL/L (ref 0.7–2)
LIPASE SERPL-CCNC: 92 U/L (ref 73–393)
LYMPHOCYTES # BLD AUTO: 2.1 10E3/UL (ref 0.8–5.3)
LYMPHOCYTES NFR BLD AUTO: 11 %
MCH RBC QN AUTO: 31 PG (ref 26.5–33)
MCHC RBC AUTO-ENTMCNC: 32.9 G/DL (ref 31.5–36.5)
MCV RBC AUTO: 94 FL (ref 78–100)
MONOCYTES # BLD AUTO: 0.7 10E3/UL (ref 0–1.3)
MONOCYTES NFR BLD AUTO: 4 %
NEUTROPHILS # BLD AUTO: 15.2 10E3/UL (ref 1.6–8.3)
NEUTROPHILS NFR BLD AUTO: 85 %
NRBC # BLD AUTO: 0 10E3/UL
NRBC BLD AUTO-RTO: 0 /100
PLATELET # BLD AUTO: 252 10E3/UL (ref 150–450)
POTASSIUM BLD-SCNC: 4.6 MMOL/L (ref 3.4–5.3)
PROT SERPL-MCNC: 9.3 G/DL (ref 6.8–8.8)
RBC # BLD AUTO: 4.61 10E6/UL (ref 4.4–5.9)
SARS-COV-2 RNA RESP QL NAA+PROBE: NEGATIVE
SODIUM SERPL-SCNC: 136 MMOL/L (ref 133–144)
TROPONIN I SERPL HS-MCNC: 52 NG/L
WBC # BLD AUTO: 18 10E3/UL (ref 4–11)

## 2022-06-09 PROCEDURE — 96375 TX/PRO/DX INJ NEW DRUG ADDON: CPT | Performed by: EMERGENCY MEDICINE

## 2022-06-09 PROCEDURE — 250N000013 HC RX MED GY IP 250 OP 250 PS 637: Performed by: NURSE ANESTHETIST, CERTIFIED REGISTERED

## 2022-06-09 PROCEDURE — 80053 COMPREHEN METABOLIC PANEL: CPT | Performed by: EMERGENCY MEDICINE

## 2022-06-09 PROCEDURE — C9803 HOPD COVID-19 SPEC COLLECT: HCPCS | Performed by: EMERGENCY MEDICINE

## 2022-06-09 PROCEDURE — 47562 LAPAROSCOPIC CHOLECYSTECTOMY: CPT | Mod: 22 | Performed by: SURGERY

## 2022-06-09 PROCEDURE — 93005 ELECTROCARDIOGRAM TRACING: CPT | Performed by: EMERGENCY MEDICINE

## 2022-06-09 PROCEDURE — 36415 COLL VENOUS BLD VENIPUNCTURE: CPT | Performed by: EMERGENCY MEDICINE

## 2022-06-09 PROCEDURE — 360N000076 HC SURGERY LEVEL 3, PER MIN: Performed by: SURGERY

## 2022-06-09 PROCEDURE — 250N000009 HC RX 250: Performed by: SURGERY

## 2022-06-09 PROCEDURE — 250N000013 HC RX MED GY IP 250 OP 250 PS 637: Performed by: SURGERY

## 2022-06-09 PROCEDURE — 250N000025 HC SEVOFLURANE, PER MIN: Performed by: SURGERY

## 2022-06-09 PROCEDURE — 250N000009 HC RX 250: Performed by: NURSE ANESTHETIST, CERTIFIED REGISTERED

## 2022-06-09 PROCEDURE — 999N000141 HC STATISTIC PRE-PROCEDURE NURSING ASSESSMENT: Performed by: SURGERY

## 2022-06-09 PROCEDURE — 87635 SARS-COV-2 COVID-19 AMP PRB: CPT | Performed by: FAMILY MEDICINE

## 2022-06-09 PROCEDURE — 250N000011 HC RX IP 250 OP 636: Performed by: NURSE ANESTHETIST, CERTIFIED REGISTERED

## 2022-06-09 PROCEDURE — 250N000011 HC RX IP 250 OP 636: Performed by: EMERGENCY MEDICINE

## 2022-06-09 PROCEDURE — 36415 COLL VENOUS BLD VENIPUNCTURE: CPT | Performed by: SURGERY

## 2022-06-09 PROCEDURE — 272N000001 HC OR GENERAL SUPPLY STERILE: Performed by: SURGERY

## 2022-06-09 PROCEDURE — 99285 EMERGENCY DEPT VISIT HI MDM: CPT | Mod: 25 | Performed by: EMERGENCY MEDICINE

## 2022-06-09 PROCEDURE — 370N000017 HC ANESTHESIA TECHNICAL FEE, PER MIN: Performed by: SURGERY

## 2022-06-09 PROCEDURE — 83605 ASSAY OF LACTIC ACID: CPT | Performed by: SURGERY

## 2022-06-09 PROCEDURE — 88304 TISSUE EXAM BY PATHOLOGIST: CPT | Mod: TC | Performed by: SURGERY

## 2022-06-09 PROCEDURE — 47562 LAPAROSCOPIC CHOLECYSTECTOMY: CPT | Mod: AS | Performed by: PHYSICIAN ASSISTANT

## 2022-06-09 PROCEDURE — 74177 CT ABD & PELVIS W/CONTRAST: CPT

## 2022-06-09 PROCEDURE — 85014 HEMATOCRIT: CPT | Performed by: EMERGENCY MEDICINE

## 2022-06-09 PROCEDURE — 271N000001 HC OR GENERAL SUPPLY NON-STERILE: Performed by: SURGERY

## 2022-06-09 PROCEDURE — 258N000003 HC RX IP 258 OP 636: Performed by: SURGERY

## 2022-06-09 PROCEDURE — 710N000009 HC RECOVERY PHASE 1, LEVEL 1, PER MIN: Performed by: SURGERY

## 2022-06-09 PROCEDURE — 99285 EMERGENCY DEPT VISIT HI MDM: CPT | Performed by: EMERGENCY MEDICINE

## 2022-06-09 PROCEDURE — 96374 THER/PROPH/DIAG INJ IV PUSH: CPT | Mod: 59 | Performed by: EMERGENCY MEDICINE

## 2022-06-09 PROCEDURE — 250N000011 HC RX IP 250 OP 636: Performed by: SURGERY

## 2022-06-09 PROCEDURE — 96376 TX/PRO/DX INJ SAME DRUG ADON: CPT | Mod: 59 | Performed by: EMERGENCY MEDICINE

## 2022-06-09 PROCEDURE — 250N000009 HC RX 250: Performed by: EMERGENCY MEDICINE

## 2022-06-09 PROCEDURE — 82040 ASSAY OF SERUM ALBUMIN: CPT | Performed by: EMERGENCY MEDICINE

## 2022-06-09 PROCEDURE — 258N000003 HC RX IP 258 OP 636: Performed by: NURSE ANESTHETIST, CERTIFIED REGISTERED

## 2022-06-09 PROCEDURE — 84484 ASSAY OF TROPONIN QUANT: CPT | Performed by: EMERGENCY MEDICINE

## 2022-06-09 PROCEDURE — 83690 ASSAY OF LIPASE: CPT | Performed by: EMERGENCY MEDICINE

## 2022-06-09 PROCEDURE — 74181 MRI ABDOMEN W/O CONTRAST: CPT

## 2022-06-09 RX ORDER — HYDROXYZINE HYDROCHLORIDE 25 MG/1
25 TABLET, FILM COATED ORAL ONCE
Status: COMPLETED | OUTPATIENT
Start: 2022-06-09 | End: 2022-06-09

## 2022-06-09 RX ORDER — ONDANSETRON 2 MG/ML
4 INJECTION INTRAMUSCULAR; INTRAVENOUS EVERY 6 HOURS PRN
Status: DISCONTINUED | OUTPATIENT
Start: 2022-06-09 | End: 2022-06-10 | Stop reason: HOSPADM

## 2022-06-09 RX ORDER — ONDANSETRON 2 MG/ML
4 INJECTION INTRAMUSCULAR; INTRAVENOUS EVERY 30 MIN PRN
Status: DISCONTINUED | OUTPATIENT
Start: 2022-06-09 | End: 2022-06-09 | Stop reason: HOSPADM

## 2022-06-09 RX ORDER — DEXAMETHASONE SODIUM PHOSPHATE 4 MG/ML
INJECTION, SOLUTION INTRA-ARTICULAR; INTRALESIONAL; INTRAMUSCULAR; INTRAVENOUS; SOFT TISSUE PRN
Status: DISCONTINUED | OUTPATIENT
Start: 2022-06-09 | End: 2022-06-09

## 2022-06-09 RX ORDER — LIDOCAINE HYDROCHLORIDE 10 MG/ML
INJECTION, SOLUTION INFILTRATION; PERINEURAL PRN
Status: DISCONTINUED | OUTPATIENT
Start: 2022-06-09 | End: 2022-06-09

## 2022-06-09 RX ORDER — ALBUTEROL SULFATE 90 UG/1
2 AEROSOL, METERED RESPIRATORY (INHALATION) EVERY 4 HOURS PRN
Status: DISCONTINUED | OUTPATIENT
Start: 2022-06-09 | End: 2022-06-10 | Stop reason: HOSPADM

## 2022-06-09 RX ORDER — DIMENHYDRINATE 50 MG/ML
25 INJECTION, SOLUTION INTRAMUSCULAR; INTRAVENOUS
Status: DISCONTINUED | OUTPATIENT
Start: 2022-06-09 | End: 2022-06-09 | Stop reason: HOSPADM

## 2022-06-09 RX ORDER — PANTOPRAZOLE SODIUM 40 MG/1
40 TABLET, DELAYED RELEASE ORAL
Status: DISCONTINUED | OUTPATIENT
Start: 2022-06-10 | End: 2022-06-10 | Stop reason: HOSPADM

## 2022-06-09 RX ORDER — SODIUM CHLORIDE 9 MG/ML
INJECTION, SOLUTION INTRAVENOUS CONTINUOUS
Status: DISCONTINUED | OUTPATIENT
Start: 2022-06-09 | End: 2022-06-10

## 2022-06-09 RX ORDER — IOPAMIDOL 755 MG/ML
79 INJECTION, SOLUTION INTRAVASCULAR ONCE
Status: COMPLETED | OUTPATIENT
Start: 2022-06-09 | End: 2022-06-09

## 2022-06-09 RX ORDER — LIDOCAINE 40 MG/G
CREAM TOPICAL
Status: DISCONTINUED | OUTPATIENT
Start: 2022-06-09 | End: 2022-06-09 | Stop reason: HOSPADM

## 2022-06-09 RX ORDER — ONDANSETRON 2 MG/ML
4 INJECTION INTRAMUSCULAR; INTRAVENOUS EVERY 30 MIN PRN
Status: DISCONTINUED | OUTPATIENT
Start: 2022-06-09 | End: 2022-06-09

## 2022-06-09 RX ORDER — LIDOCAINE 40 MG/G
CREAM TOPICAL
Status: DISCONTINUED | OUTPATIENT
Start: 2022-06-09 | End: 2022-06-10 | Stop reason: HOSPADM

## 2022-06-09 RX ORDER — SODIUM CHLORIDE, SODIUM LACTATE, POTASSIUM CHLORIDE, CALCIUM CHLORIDE 600; 310; 30; 20 MG/100ML; MG/100ML; MG/100ML; MG/100ML
INJECTION, SOLUTION INTRAVENOUS CONTINUOUS
Status: DISCONTINUED | OUTPATIENT
Start: 2022-06-09 | End: 2022-06-09 | Stop reason: HOSPADM

## 2022-06-09 RX ORDER — FENTANYL CITRATE 50 UG/ML
INJECTION, SOLUTION INTRAMUSCULAR; INTRAVENOUS PRN
Status: DISCONTINUED | OUTPATIENT
Start: 2022-06-09 | End: 2022-06-09

## 2022-06-09 RX ORDER — OXYCODONE HYDROCHLORIDE 5 MG/1
5 TABLET ORAL EVERY 4 HOURS PRN
Status: DISCONTINUED | OUTPATIENT
Start: 2022-06-09 | End: 2022-06-10 | Stop reason: HOSPADM

## 2022-06-09 RX ORDER — LOSARTAN POTASSIUM 50 MG/1
50 TABLET ORAL DAILY
Status: DISCONTINUED | OUTPATIENT
Start: 2022-06-10 | End: 2022-06-10 | Stop reason: HOSPADM

## 2022-06-09 RX ORDER — ONDANSETRON 4 MG/1
4 TABLET, ORALLY DISINTEGRATING ORAL EVERY 30 MIN PRN
Status: DISCONTINUED | OUTPATIENT
Start: 2022-06-09 | End: 2022-06-09 | Stop reason: HOSPADM

## 2022-06-09 RX ORDER — AMOXICILLIN 250 MG
1 CAPSULE ORAL 2 TIMES DAILY
Status: DISCONTINUED | OUTPATIENT
Start: 2022-06-09 | End: 2022-06-10 | Stop reason: HOSPADM

## 2022-06-09 RX ORDER — GABAPENTIN 100 MG/1
100 CAPSULE ORAL
Status: COMPLETED | OUTPATIENT
Start: 2022-06-09 | End: 2022-06-09

## 2022-06-09 RX ORDER — ENOXAPARIN SODIUM 100 MG/ML
40 INJECTION SUBCUTANEOUS EVERY 24 HOURS
Status: DISCONTINUED | OUTPATIENT
Start: 2022-06-10 | End: 2022-06-10 | Stop reason: HOSPADM

## 2022-06-09 RX ORDER — FENTANYL CITRATE 50 UG/ML
25 INJECTION, SOLUTION INTRAMUSCULAR; INTRAVENOUS EVERY 5 MIN PRN
Status: DISCONTINUED | OUTPATIENT
Start: 2022-06-09 | End: 2022-06-09 | Stop reason: HOSPADM

## 2022-06-09 RX ORDER — NALOXONE HYDROCHLORIDE 0.4 MG/ML
0.2 INJECTION, SOLUTION INTRAMUSCULAR; INTRAVENOUS; SUBCUTANEOUS
Status: DISCONTINUED | OUTPATIENT
Start: 2022-06-09 | End: 2022-06-10 | Stop reason: HOSPADM

## 2022-06-09 RX ORDER — ATENOLOL 100 MG/1
100 TABLET ORAL DAILY
Status: DISCONTINUED | OUTPATIENT
Start: 2022-06-10 | End: 2022-06-10 | Stop reason: HOSPADM

## 2022-06-09 RX ORDER — EPHEDRINE SULFATE 50 MG/ML
INJECTION, SOLUTION INTRAMUSCULAR; INTRAVENOUS; SUBCUTANEOUS PRN
Status: DISCONTINUED | OUTPATIENT
Start: 2022-06-09 | End: 2022-06-09

## 2022-06-09 RX ORDER — OXYCODONE HCL 5 MG/5 ML
5 SOLUTION, ORAL ORAL EVERY 4 HOURS PRN
Status: DISCONTINUED | OUTPATIENT
Start: 2022-06-09 | End: 2022-06-09

## 2022-06-09 RX ORDER — POLYETHYLENE GLYCOL 3350 17 G/17G
17 POWDER, FOR SOLUTION ORAL DAILY
Status: DISCONTINUED | OUTPATIENT
Start: 2022-06-10 | End: 2022-06-10 | Stop reason: HOSPADM

## 2022-06-09 RX ORDER — PROCHLORPERAZINE MALEATE 5 MG
5 TABLET ORAL EVERY 6 HOURS PRN
Status: DISCONTINUED | OUTPATIENT
Start: 2022-06-09 | End: 2022-06-10 | Stop reason: HOSPADM

## 2022-06-09 RX ORDER — OXYCODONE HYDROCHLORIDE 5 MG/1
10 TABLET ORAL EVERY 4 HOURS PRN
Status: DISCONTINUED | OUTPATIENT
Start: 2022-06-09 | End: 2022-06-10 | Stop reason: HOSPADM

## 2022-06-09 RX ORDER — FAMOTIDINE 20 MG/1
20 TABLET, FILM COATED ORAL 2 TIMES DAILY
Status: DISCONTINUED | OUTPATIENT
Start: 2022-06-09 | End: 2022-06-10 | Stop reason: HOSPADM

## 2022-06-09 RX ORDER — MAGNESIUM SULFATE HEPTAHYDRATE 40 MG/ML
2 INJECTION, SOLUTION INTRAVENOUS ONCE
Status: COMPLETED | OUTPATIENT
Start: 2022-06-09 | End: 2022-06-09

## 2022-06-09 RX ORDER — BISACODYL 10 MG
10 SUPPOSITORY, RECTAL RECTAL DAILY PRN
Status: DISCONTINUED | OUTPATIENT
Start: 2022-06-09 | End: 2022-06-10 | Stop reason: HOSPADM

## 2022-06-09 RX ORDER — ACETAMINOPHEN 325 MG/1
975 TABLET ORAL EVERY 8 HOURS
Status: DISCONTINUED | OUTPATIENT
Start: 2022-06-09 | End: 2022-06-10 | Stop reason: HOSPADM

## 2022-06-09 RX ORDER — CEFAZOLIN SODIUM/WATER 2 G/20 ML
2 SYRINGE (ML) INTRAVENOUS SEE ADMIN INSTRUCTIONS
Status: DISCONTINUED | OUTPATIENT
Start: 2022-06-09 | End: 2022-06-09 | Stop reason: HOSPADM

## 2022-06-09 RX ORDER — ACETAMINOPHEN 325 MG/1
975 TABLET ORAL ONCE
Status: COMPLETED | OUTPATIENT
Start: 2022-06-09 | End: 2022-06-09

## 2022-06-09 RX ORDER — ACETAMINOPHEN 325 MG/1
650 TABLET ORAL EVERY 4 HOURS PRN
Status: DISCONTINUED | OUTPATIENT
Start: 2022-06-12 | End: 2022-06-10 | Stop reason: HOSPADM

## 2022-06-09 RX ORDER — HYDROXYZINE HYDROCHLORIDE 10 MG/1
10 TABLET, FILM COATED ORAL EVERY 6 HOURS PRN
Status: DISCONTINUED | OUTPATIENT
Start: 2022-06-09 | End: 2022-06-09

## 2022-06-09 RX ORDER — GLYCOPYRROLATE 0.2 MG/ML
INJECTION, SOLUTION INTRAMUSCULAR; INTRAVENOUS PRN
Status: DISCONTINUED | OUTPATIENT
Start: 2022-06-09 | End: 2022-06-09

## 2022-06-09 RX ORDER — HYDROMORPHONE HCL IN WATER/PF 6 MG/30 ML
0.4 PATIENT CONTROLLED ANALGESIA SYRINGE INTRAVENOUS
Status: DISCONTINUED | OUTPATIENT
Start: 2022-06-09 | End: 2022-06-10 | Stop reason: HOSPADM

## 2022-06-09 RX ORDER — CEFAZOLIN SODIUM/WATER 2 G/20 ML
2 SYRINGE (ML) INTRAVENOUS
Status: COMPLETED | OUTPATIENT
Start: 2022-06-09 | End: 2022-06-09

## 2022-06-09 RX ORDER — ONDANSETRON 4 MG/1
4 TABLET, ORALLY DISINTEGRATING ORAL EVERY 6 HOURS PRN
Status: DISCONTINUED | OUTPATIENT
Start: 2022-06-09 | End: 2022-06-10 | Stop reason: HOSPADM

## 2022-06-09 RX ORDER — HEPARIN SODIUM 5000 [USP'U]/.5ML
5000 INJECTION, SOLUTION INTRAVENOUS; SUBCUTANEOUS
Status: COMPLETED | OUTPATIENT
Start: 2022-06-09 | End: 2022-06-09

## 2022-06-09 RX ORDER — HYDROMORPHONE HCL IN WATER/PF 6 MG/30 ML
0.2 PATIENT CONTROLLED ANALGESIA SYRINGE INTRAVENOUS
Status: DISCONTINUED | OUTPATIENT
Start: 2022-06-09 | End: 2022-06-10 | Stop reason: HOSPADM

## 2022-06-09 RX ORDER — NALOXONE HYDROCHLORIDE 0.4 MG/ML
0.4 INJECTION, SOLUTION INTRAMUSCULAR; INTRAVENOUS; SUBCUTANEOUS
Status: DISCONTINUED | OUTPATIENT
Start: 2022-06-09 | End: 2022-06-10 | Stop reason: HOSPADM

## 2022-06-09 RX ORDER — AMPICILLIN AND SULBACTAM 2; 1 G/1; G/1
3 INJECTION, POWDER, FOR SOLUTION INTRAMUSCULAR; INTRAVENOUS EVERY 6 HOURS
Status: DISCONTINUED | OUTPATIENT
Start: 2022-06-09 | End: 2022-06-10 | Stop reason: HOSPADM

## 2022-06-09 RX ORDER — MEPERIDINE HYDROCHLORIDE 25 MG/ML
12.5 INJECTION INTRAMUSCULAR; INTRAVENOUS; SUBCUTANEOUS EVERY 5 MIN PRN
Status: DISCONTINUED | OUTPATIENT
Start: 2022-06-09 | End: 2022-06-09 | Stop reason: HOSPADM

## 2022-06-09 RX ORDER — HYDROMORPHONE HCL IN WATER/PF 6 MG/30 ML
0.2 PATIENT CONTROLLED ANALGESIA SYRINGE INTRAVENOUS EVERY 5 MIN PRN
Status: DISCONTINUED | OUTPATIENT
Start: 2022-06-09 | End: 2022-06-09 | Stop reason: HOSPADM

## 2022-06-09 RX ORDER — HYDROMORPHONE HYDROCHLORIDE 1 MG/ML
0.5 INJECTION, SOLUTION INTRAMUSCULAR; INTRAVENOUS; SUBCUTANEOUS
Status: DISCONTINUED | OUTPATIENT
Start: 2022-06-09 | End: 2022-06-09

## 2022-06-09 RX ORDER — OXYCODONE HYDROCHLORIDE 5 MG/1
5 TABLET ORAL ONCE
Status: COMPLETED | OUTPATIENT
Start: 2022-06-09 | End: 2022-06-09

## 2022-06-09 RX ORDER — PROPOFOL 10 MG/ML
INJECTION, EMULSION INTRAVENOUS PRN
Status: DISCONTINUED | OUTPATIENT
Start: 2022-06-09 | End: 2022-06-09

## 2022-06-09 RX ORDER — BUPIVACAINE HYDROCHLORIDE AND EPINEPHRINE 2.5; 5 MG/ML; UG/ML
INJECTION, SOLUTION INFILTRATION; PERINEURAL PRN
Status: DISCONTINUED | OUTPATIENT
Start: 2022-06-09 | End: 2022-06-09 | Stop reason: HOSPADM

## 2022-06-09 RX ADMIN — ROCURONIUM BROMIDE 40 MG: 50 INJECTION, SOLUTION INTRAVENOUS at 15:12

## 2022-06-09 RX ADMIN — FENTANYL CITRATE 50 MCG: 50 INJECTION, SOLUTION INTRAMUSCULAR; INTRAVENOUS at 15:12

## 2022-06-09 RX ADMIN — Medication 5 MG: at 15:22

## 2022-06-09 RX ADMIN — FENTANYL CITRATE 50 MCG: 50 INJECTION, SOLUTION INTRAMUSCULAR; INTRAVENOUS at 15:19

## 2022-06-09 RX ADMIN — SODIUM CHLORIDE: 9 INJECTION, SOLUTION INTRAVENOUS at 22:23

## 2022-06-09 RX ADMIN — FAMOTIDINE 20 MG: 20 TABLET, FILM COATED ORAL at 21:01

## 2022-06-09 RX ADMIN — Medication 2 G: at 14:53

## 2022-06-09 RX ADMIN — OXYCODONE HYDROCHLORIDE 5 MG: 5 TABLET ORAL at 17:48

## 2022-06-09 RX ADMIN — HYDROMORPHONE HYDROCHLORIDE 0.5 MG: 1 INJECTION, SOLUTION INTRAMUSCULAR; INTRAVENOUS; SUBCUTANEOUS at 10:14

## 2022-06-09 RX ADMIN — MAGNESIUM SULFATE HEPTAHYDRATE 2 G: 40 INJECTION, SOLUTION INTRAVENOUS at 15:11

## 2022-06-09 RX ADMIN — Medication 10 MG: at 15:08

## 2022-06-09 RX ADMIN — Medication 5 MG: at 15:14

## 2022-06-09 RX ADMIN — HEPARIN SODIUM 5000 UNITS: 10000 INJECTION, SOLUTION INTRAVENOUS; SUBCUTANEOUS at 15:12

## 2022-06-09 RX ADMIN — SODIUM CHLORIDE, POTASSIUM CHLORIDE, SODIUM LACTATE AND CALCIUM CHLORIDE: 600; 310; 30; 20 INJECTION, SOLUTION INTRAVENOUS at 14:51

## 2022-06-09 RX ADMIN — LIDOCAINE HYDROCHLORIDE 100 MG: 10 INJECTION, SOLUTION INFILTRATION; PERINEURAL at 15:12

## 2022-06-09 RX ADMIN — ONDANSETRON 4 MG: 2 INJECTION INTRAMUSCULAR; INTRAVENOUS at 05:55

## 2022-06-09 RX ADMIN — SODIUM CHLORIDE 60 ML: 9 INJECTION, SOLUTION INTRAVENOUS at 06:16

## 2022-06-09 RX ADMIN — GLYCOPYRROLATE 0.1 MG: 0.2 INJECTION, SOLUTION INTRAMUSCULAR; INTRAVENOUS at 14:59

## 2022-06-09 RX ADMIN — IOPAMIDOL 79 ML: 755 INJECTION, SOLUTION INTRAVENOUS at 06:16

## 2022-06-09 RX ADMIN — DEXAMETHASONE SODIUM PHOSPHATE 4 MG: 4 INJECTION, SOLUTION INTRA-ARTICULAR; INTRALESIONAL; INTRAMUSCULAR; INTRAVENOUS; SOFT TISSUE at 15:12

## 2022-06-09 RX ADMIN — ACETAMINOPHEN 975 MG: 325 TABLET, FILM COATED ORAL at 22:42

## 2022-06-09 RX ADMIN — Medication 5 MG: at 15:12

## 2022-06-09 RX ADMIN — GLYCOPYRROLATE 0.1 MG: 0.2 INJECTION, SOLUTION INTRAMUSCULAR; INTRAVENOUS at 14:57

## 2022-06-09 RX ADMIN — HYDROXYZINE HYDROCHLORIDE 25 MG: 25 TABLET, FILM COATED ORAL at 17:47

## 2022-06-09 RX ADMIN — ACETAMINOPHEN 975 MG: 325 TABLET, FILM COATED ORAL at 14:21

## 2022-06-09 RX ADMIN — HYDROMORPHONE HYDROCHLORIDE 0.5 MG: 1 INJECTION, SOLUTION INTRAMUSCULAR; INTRAVENOUS; SUBCUTANEOUS at 06:10

## 2022-06-09 RX ADMIN — SODIUM CHLORIDE 1000 ML: 9 INJECTION, SOLUTION INTRAVENOUS at 20:51

## 2022-06-09 RX ADMIN — PHENYLEPHRINE HYDROCHLORIDE 100 MCG: 10 INJECTION INTRAVENOUS at 16:09

## 2022-06-09 RX ADMIN — MIDAZOLAM 2 MG: 1 INJECTION INTRAMUSCULAR; INTRAVENOUS at 14:57

## 2022-06-09 RX ADMIN — SENNOSIDES AND DOCUSATE SODIUM 1 TABLET: 50; 8.6 TABLET ORAL at 21:01

## 2022-06-09 RX ADMIN — GABAPENTIN 100 MG: 100 CAPSULE ORAL at 14:22

## 2022-06-09 RX ADMIN — PROPOFOL 140 MG: 10 INJECTION, EMULSION INTRAVENOUS at 15:12

## 2022-06-09 ASSESSMENT — ENCOUNTER SYMPTOMS
NAUSEA: 1
FEVER: 0
SHORTNESS OF BREATH: 0
ABDOMINAL PAIN: 1
VOMITING: 1

## 2022-06-09 NOTE — ANESTHESIA PREPROCEDURE EVALUATION
Anesthesia Pre-Procedure Evaluation    Patient: iWllian Reid   MRN: [id number] : 1950        Procedure : Procedure(s):  CHOLECYSTECTOMY, LAPAROSCOPIC, possible open          Past Medical History:   Diagnosis Date     Diverticulitis of colon (without mention of hemorrhage)(562.11)       Past Surgical History:   Procedure Laterality Date     FLEXIBLE SIGMOIDOSCOPY  3/2/2005     Flexible sigmoidoscopy probably should be changed to a colonoscopy at his next visit to get further and to help him with sedation, so in  he should have a colonoscopy and then every 10 years afterwards      Allergies   Allergen Reactions     Tetracycline Rash      Social History     Tobacco Use     Smoking status: Never Smoker     Smokeless tobacco: Never Used   Substance Use Topics     Alcohol use: No      Wt Readings from Last 1 Encounters:   22 72.6 kg (160 lb)        Anesthesia Evaluation   Pt has had prior anesthetic. Type: MAC.    No history of anesthetic complications       ROS/MED HX  ENT/Pulmonary:     (+) allergic rhinitis, asthma Treatment: Inhaler prn,      Neurologic:  - neg neurologic ROS     Cardiovascular:     (+) Dyslipidemia hypertension-----    METS/Exercise Tolerance:     Hematologic:  - neg hematologic  ROS     Musculoskeletal: Comment: Lumbar DDD      GI/Hepatic: Comment: diverticulosis    (+) GERD, Other, cholecystitis/cholelithiasis,     Renal/Genitourinary:  - neg Renal ROS     Endo: Comment: overweight      Psychiatric/Substance Use:  - neg psychiatric ROS     Infectious Disease:  - neg infectious disease ROS     Malignancy:  - neg malignancy ROS     Other:  - neg other ROS          Physical Exam    Airway      Comment: Overbite    Mallampati: III   TM distance: < 3 FB   Neck ROM: full   Mouth opening: > 3 cm    Respiratory Devices and Support         Dental     Comment: Lower bridge        Cardiovascular   cardiovascular exam normal          Pulmonary   pulmonary exam normal                 OUTSIDE LABS:  CBC:   Lab Results   Component Value Date    WBC 18.0 (H) 06/09/2022    WBC 9.5 03/22/2012    HGB 14.3 06/09/2022    HGB 14.7 03/22/2012    HCT 43.4 06/09/2022    HCT 44.0 03/22/2012     06/09/2022     03/22/2012     BMP:   Lab Results   Component Value Date     06/09/2022     11/10/2021    POTASSIUM 4.6 06/09/2022    POTASSIUM 4.7 11/10/2021    CHLORIDE 104 06/09/2022    CHLORIDE 107 11/10/2021    CO2 27 06/09/2022    CO2 28 11/10/2021    BUN 16 06/09/2022    BUN 19 11/10/2021    CR 1.02 06/09/2022    CR 1.04 11/10/2021     (H) 06/09/2022    GLC 95 11/10/2021     COAGS: No results found for: PTT, INR, FIBR  POC: No results found for: BGM, HCG, HCGS  HEPATIC:   Lab Results   Component Value Date    ALBUMIN 4.3 06/09/2022    PROTTOTAL 9.3 (H) 06/09/2022    ALT 22 06/09/2022    AST 29 06/09/2022    ALKPHOS 92 06/09/2022    BILITOTAL 0.7 06/09/2022     OTHER:   Lab Results   Component Value Date    A1C 5.7 (H) 11/10/2021    ROSSY 9.6 06/09/2022    PHOS 2.5 10/23/2009    MAG 2.2 10/23/2009    LIPASE 92 06/09/2022    TSH 0.81 03/02/2009    T4 1.11 03/02/2009       Anesthesia Plan    ASA Status:  2   NPO Status:  NPO Appropriate    Anesthesia Type: General.     - Airway: ETT   Induction: Intravenous.   Maintenance: Balanced.        Consents    Anesthesia Plan(s) and associated risks, benefits, and realistic alternatives discussed. Questions answered and patient/representative(s) expressed understanding.     - Discussed: Risks, Benefits and Alternatives for BOTH SEDATION and the PROCEDURE were discussed     - Discussed with:  Patient, Spouse         Postoperative Care    Pain management: IV analgesics, Oral pain medications, Multi-modal analgesia.   PONV prophylaxis: Ondansetron (or other 5HT-3), Dexamethasone or Solumedrol     Comments:                CECELIA Conner CRNA

## 2022-06-09 NOTE — ANESTHESIA CARE TRANSFER NOTE
Patient: Willian Reid    Procedure: Procedure(s):  CHOLECYSTECTOMY, LAPAROSCOPIC       Diagnosis: Acute cholecystitis with chronic cholecystitis [K81.2]  Diagnosis Additional Information: No value filed.    Anesthesia Type:   General     Note:    Oropharynx: oropharynx clear of all foreign objects and spontaneously breathing  Level of Consciousness: drowsy  Oxygen Supplementation: nasal cannula  Level of Supplemental Oxygen (L/min / FiO2): 3  Independent Airway: airway patency satisfactory and stable  Dentition: dentition unchanged  Vital Signs Stable: post-procedure vital signs reviewed and stable  Report to RN Given: handoff report given  Patient transferred to: PACU    Handoff Report: Identifed the Patient, Identified the Reponsible Provider, Reviewed the pertinent medical history, Discussed the surgical course, Reviewed Intra-OP anesthesia mangement and issues during anesthesia, Set expectations for post-procedure period and Allowed opportunity for questions and acknowledgement of understanding      Vitals:  Vitals Value Taken Time   BP     Temp     Pulse     Resp     SpO2         Electronically Signed By: CECELIA Conner CRNA  June 9, 2022  4:38 PM

## 2022-06-09 NOTE — BRIEF OP NOTE
[facility]    Brief Operative Note    Pre-operative diagnosis: Acute cholecystitis with chronic cholecystitis [K81.2]  Post-operative diagnosis Purulent cholecystitis    Procedure: Procedure(s):  CHOLECYSTECTOMY, LAPAROSCOPIC  Surgeon: Surgeon(s) and Role:     * Joya Bains MD - Primary     * Ankit Bundy PA-C - Assisting  Anesthesia: General   Estimated Blood Loss: 10 mL from 6/9/2022  2:55 PM to 6/9/2022  4:35 PM      Drains: None  Specimens:   ID Type Source Tests Collected by Time Destination   1 :  Tissue Gallbladder with Stone(s) SURGICAL PATHOLOGY EXAM Joya Bains MD 6/9/2022  4:12 PM      Findings:   acute purulent cholecystitis.  Noted hydrop.  But at the  infundibulum, there was purulent material noted as a rent was made during retraction due to the acute inflamed and friable tissue.  Notes of different size stones spillage.  most visible stones were removed.  .  Complications: None.  Implants: * No implants in log *      Joya Bains DO

## 2022-06-09 NOTE — ED TRIAGE NOTES
Pt presents with severe pain to mid back, upper abd epigastric areas that began yesterday afternoon.     Triage Assessment     Row Name 06/09/22 0533       Triage Assessment (Adult)    Airway WDL WDL       Respiratory WDL    Respiratory WDL WDL       Skin Circulation/Temperature WDL    Skin Circulation/Temperature WDL WDL       Cardiac WDL    Cardiac WDL WDL       Peripheral/Neurovascular WDL    Peripheral Neurovascular WDL WDL       Cognitive/Neuro/Behavioral WDL    Cognitive/Neuro/Behavioral WDL WDL

## 2022-06-09 NOTE — ED PROVIDER NOTES
"     Emergency Department Patient Sign-out       Brief HPI:  This is a 72 year old male signed out to me by Dr. Laura.  See initial ED Provider note for details of the presentation.  Any significant events that occurred prior to my assuming care were also reviewed.     Exam:   Patient Vitals for the past 24 hrs:   BP Temp src Pulse Resp SpO2 Height Weight   06/09/22 1300 -- -- -- -- 95 % -- --   06/09/22 1215 103/45 -- -- -- 95 % -- --   06/09/22 1100 -- -- -- -- 95 % -- --   06/09/22 1045 113/60 -- 52 -- 95 % -- --   06/09/22 1030 114/63 -- 54 -- 95 % -- --   06/09/22 1015 (!) 143/70 -- 70 (!) 31 98 % -- --   06/09/22 1000 139/69 -- 50 17 96 % -- --   06/09/22 0945 127/75 -- 50 14 96 % -- --   06/09/22 0930 (!) 140/94 -- 56 17 96 % -- --   06/09/22 0915 (!) 146/74 -- (!) 49 17 99 % -- --   06/09/22 0900 (!) 154/73 -- 51 24 99 % -- --   06/09/22 0845 (!) 153/107 -- 59 26 94 % -- --   06/09/22 0830 (!) 145/75 -- 51 17 95 % -- --   06/09/22 0800 (!) 147/76 -- 53 15 91 % -- --   06/09/22 0730 (!) 145/74 -- (!) 49 16 92 % -- --   06/09/22 0715 (!) 147/76 -- 50 17 92 % -- --   06/09/22 0700 (!) 144/79 -- 51 18 92 % -- --   06/09/22 0645 (!) 164/82 -- 53 17 91 % -- --   06/09/22 0630 (!) 166/78 -- 68 18 96 % -- --   06/09/22 0600 (!) 169/90 -- 52 18 99 % -- --   06/09/22 0545 (!) 165/97 -- 56 20 98 % -- --   06/09/22 0530 (!) 183/98 -- 59 15 99 % -- --   06/09/22 0527 (!) 183/98 Oral 61 18 97 % 1.676 m (5' 6\") 72.6 kg (160 lb)           ED RESULTS:   Results for orders placed or performed during the hospital encounter of 06/09/22 (from the past 24 hour(s))   CBC with platelets differential     Status: Abnormal    Collection Time: 06/09/22  5:37 AM    Narrative    The following orders were created for panel order CBC with platelets differential.  Procedure                               Abnormality         Status                     ---------                               -----------         ------                     CBC " with platelets and d...[[id number]]  Abnormal            Final result                 Please view results for these tests on the individual orders.   Comprehensive metabolic panel     Status: Abnormal    Collection Time: 06/09/22  5:37 AM   Result Value Ref Range    Sodium 136 133 - 144 mmol/L    Potassium 4.6 3.4 - 5.3 mmol/L    Chloride 104 94 - 109 mmol/L    Carbon Dioxide (CO2) 27 20 - 32 mmol/L    Anion Gap 5 3 - 14 mmol/L    Urea Nitrogen 16 7 - 30 mg/dL    Creatinine 1.02 0.66 - 1.25 mg/dL    Calcium 9.6 8.5 - 10.1 mg/dL    Glucose 131 (H) 70 - 99 mg/dL    Alkaline Phosphatase 92 40 - 150 U/L    AST 29 0 - 45 U/L    ALT 22 0 - 70 U/L    Protein Total 9.3 (H) 6.8 - 8.8 g/dL    Albumin 4.3 3.4 - 5.0 g/dL    Bilirubin Total 0.7 0.2 - 1.3 mg/dL    GFR Estimate 77 >60 mL/min/1.73m2   Lipase     Status: Normal    Collection Time: 06/09/22  5:37 AM   Result Value Ref Range    Lipase 92 73 - 393 U/L   Troponin I     Status: Normal    Collection Time: 06/09/22  5:37 AM   Result Value Ref Range    Troponin I High Sensitivity 52 <79 ng/L   CBC with platelets and differential     Status: Abnormal    Collection Time: 06/09/22  5:37 AM   Result Value Ref Range    WBC Count 18.0 (H) 4.0 - 11.0 10e3/uL    RBC Count 4.61 4.40 - 5.90 10e6/uL    Hemoglobin 14.3 13.3 - 17.7 g/dL    Hematocrit 43.4 40.0 - 53.0 %    MCV 94 78 - 100 fL    MCH 31.0 26.5 - 33.0 pg    MCHC 32.9 31.5 - 36.5 g/dL    RDW 13.1 10.0 - 15.0 %    Platelet Count 252 150 - 450 10e3/uL    % Neutrophils 85 %    % Lymphocytes 11 %    % Monocytes 4 %    % Eosinophils 0 %    % Basophils 0 %    % Immature Granulocytes 0 %    NRBCs per 100 WBC 0 <1 /100    Absolute Neutrophils 15.2 (H) 1.6 - 8.3 10e3/uL    Absolute Lymphocytes 2.1 0.8 - 5.3 10e3/uL    Absolute Monocytes 0.7 0.0 - 1.3 10e3/uL    Absolute Eosinophils 0.0 0.0 - 0.7 10e3/uL    Absolute Basophils 0.0 0.0 - 0.2 10e3/uL    Absolute Immature Granulocytes 0.1 <=0.4 10e3/uL    Absolute NRBCs 0.0 10e3/uL    [place] Draw     Status: None    Collection Time: 06/09/22  5:48 AM    Narrative    The following orders were created for panel order [place] Draw.  Procedure                               Abnormality         Status                     ---------                               -----------         ------                     Extra Blue Top Tube[[id number]]                              Final result                 Please view results for these tests on the individual orders.   Extra Blue Top Tube     Status: None    Collection Time: 06/09/22  5:48 AM   Result Value Ref Range    Hold Specimen [facility]    CT Abdomen Pelvis w Contrast     Status: None    Collection Time: 06/09/22  6:33 AM    Narrative    EXAM: CT ABDOMEN PELVIS W CONTRAST  LOCATION: [facility] [place]  DATE/TIME: 6/9/2022 6:10 AM    INDICATION: Abdominal distension.  COMPARISON: Ultrasound abdominal aorta 11/20/2021.  TECHNIQUE: CT scan of the abdomen and pelvis was performed following injection of IV contrast. Multiplanar reformats were obtained. Dose reduction techniques were used.  CONTRAST: 79 mL Isovue 370 IV.    FINDINGS:   LOWER CHEST: Calcified granuloma right lower lobe. Adjacent uncalcified 0.3 cm subpleural nodule right lower lobe (image 5, series 2). Minor strands of linear atelectasis right base. No pleural effusion on either side. Small hiatal hernia. Normal cardiac   size. No pericardial effusion.    HEPATOBILIARY: Dependent gallstones. The gallbladder is distended. Suggestion of tiny stones in the distal CBD (images 71-79, series 5, images 51-53, series 4, images 46-48, series 3). Consider dedicated ERCP or MRCP for further assessment. No discrete   hepatic lesion. Patent hepatic and portal veins.    PANCREAS: Normal.    SPLEEN: Calcified granuloma in the spleen.    ADRENAL GLANDS: Normal.    KIDNEYS/BLADDER: No urinary tract calculi. Cortical simple cyst in each kidney, no further follow-up required. Both kidneys are well  perfused without hydronephrosis or hydroureter. Normal urinary bladder. Heterogeneous prostatomegaly bulging into the   bladder base.    BOWEL: Gas-filled duodenal diverticulum medially adjacent to the head of the pancreas. Small hiatal hernia. Colonic diverticulosis, more apparent distally, without acute inflammation. Normal appendix. Rectosigmoid anastomotic staples.    LYMPH NODES: No suspicious abdominopelvic adenopathy.    VASCULATURE: Normal caliber mildly atherosclerotic distal abdominal aorta measuring 1.7 x 1.7 cm (image 91, series 5). Normal caliber IVC.    PELVIC ORGANS: Normal appendix. Rectosigmoid diverticulosis without acute inflammation. Rectosigmoid anastomotic staples. No adenopathy or free fluid. Prostatomegaly.    MUSCULOSKELETAL: Degenerative spine and joints of the pelvis.      Impression    IMPRESSION:   1.  Distended gallbladder containing dependent gallstones. Suggestion of tiny stones in the distal CBD. Consider dedicated ERCP or MRCP for further assessment.    2.  Cortical simple cyst in each kidney, no further follow-up required. Heterogeneous prostatomegaly bulging into the bladder base.    3.  Rectosigmoid anastomotic staples. Distal colonic diverticulosis without acute inflammation. Duodenal diverticulum medially adjacent to the head of the pancreas.    4.  Evidence of remote granulomatous disease. Uncalcified tiny nodule right lower lobe.     REFERENCE:  Guidelines for Management of Incidental Pulmonary Nodules Detected on CT Images: From the Fleischner Society 2017.   Guidelines apply to incidental nodules in patients who are 35 years or older.  Guidelines do not apply to lung cancer screening, patients with immunosuppression, or patients with known primary cancer.    SINGLE NODULE  Nodule size <6 mm  Low-risk patients: No follow-up needed.  High-risk patients: Optional follow-up at 12 months.    Nodule size 6-8 mm  Low-risk patients: Follow-up CT at 6-12 months, then consider CT at  18-24 months.  High-risk patients: Follow-up CT at 6-12 months, then at 18-24 months if no change.    Nodule size >8 mm  Either low or high-risk patients:  Consider CT, PET/CT, or tissue sampling at 3 months.    Consider referral to lung nodule clinic.                MR Abdomen MRCP without Contrast     Status: None    Collection Time: 06/09/22 11:37 AM    Narrative    MRCP Without Contrast    CLINICAL HISTORY: Right upper quadrant pain. Cholelithiasis. Normal  bilirubin and hepatic panel.    DATE: 6/9/2022 11:37 AM    TECHNIQUE: MRI of the abdomen without contrast and 3-D MRCP.    Comparison study: CT of the abdomen and pelvis earlier today    FINDINGS:  No intrahepatic or extrahepatic biliary ductal dilatation. No filling  defects or calculi in the extrahepatic bile duct. Normal variant low  medial insertion of the cystic duct (series 6, image 25).  Cholelithiasis. Mild fluid and T2 hyperintense signal around the  gallbladder neck correlating to the fat stranding seen on CT. Findings  remain suspicious for acute calculus cholecystitis. No surrounding  fluid collections.    No dilatation of the main pancreatic duct. Normal pancreatic  parenchymal signal. No significant hepatic steatosis. Mild edema in  the left hepatic lobe, segment IVB likely related to gallbladder  inflammation best seen on the diffusion-weighted images (series 14,  image 55). Otherwise, the liver, spleen, adrenal glands and pancreas  are within normal limits on noncontrast MRI. Few simple cysts in the  kidneys and otherwise normal kidneys. Small hiatal hernia. Small  duodenal diverticulum at the ampulla. Visualized loops of small bowel  and colon are normal in caliber. No abdominal lymphadenopathy. No  abdominal ascites.      Impression    IMPRESSION:   1. Findings consistent with acute calculus cholecystitis as seen on  the CT earlier today.  2. No biliary ductal dilatation or choledocholithiasis.  3. Normal variant low, medial insertion of the  cystic duct on the  extrahepatic common bile duct.    KAREN MALIK MD         SYSTEM ID:  [id number]       ED MEDICATIONS:   Medications   ondansetron (ZOFRAN) injection 4 mg (4 mg Intravenous Given 6/9/22 1875)   HYDROmorphone (PF) (DILAUDID) injection 0.5 mg (0.5 mg Intravenous Given 6/9/22 1014)   iopamidol (ISOVUE-370) solution 79 mL (79 mLs Intravenous Given 6/9/22 0616)   sodium chloride 0.9 % bag 500mL for CT scan flush use (60 mLs Intravenous Given 6/9/22 0616)       ED COURSE:  0633.  The patient had onset of pain after eating banana cream pie and having a cup of coffee on Tuesday, 2 days ago.  The pain is been constant but waxing and waning since.  Sometimes it nearly goes away completely but then it comes back with high severity.  He just had a recurring episode of pain here this morning.  Medication for pain offered.  He does have a known history of partial colectomy secondary to diverticulitis.  White blood cell count elevated.  Hepatic panel and lipase normal.  CT scan pending.    0840.  CT scan shows cholelithiasis and concern for cholecystitis but also evidence for a possible common duct stone.  That said, his bilirubin is normal and his hepatic panel is unremarkable.  Therefore, to clarify this an MRCP will be done at 10:45 AM.    1240.  MRCP shows cholecystitis without duct obstruction.  Awaiting callback from general surgery.    1321.  I spoke with Dr. Bains who will come and see the patient in the ED.  Probable transfer to the OR for cholecystectomy.  The patient is medically cleared here in the ED.  No heart history.  No lung history.  He has had surgery about 13 years ago, no complications reported.    Impression:    ICD-10-CM    1. Acute cholecystitis with chronic cholecystitis  K81.2 Case Request: CHOLECYSTECTOMY, LAPAROSCOPIC, possible open     Case Request: CHOLECYSTECTOMY, LAPAROSCOPIC, possible open   2. Abdominal pain, epigastric  R10.13    3. Abdominal bloating  R14.0    4. Essential  hypertension  I10    5. Gastroesophageal reflux disease, unspecified whether esophagitis present  K21.9          MD Anita Jara Jason M, MD  06/09/22 2753

## 2022-06-09 NOTE — H&P
"Name:  Willian Reid  Date/Time of Admission: 2022  5:24 AM   CSN: [id number]  Attending Provider: Sean Howard MD   Room/Bed:  ED02/ED02  : 1950  72 year old        Subjective:     Procedure:  Laparoscopic cholecystectomy, possible open     HPI:  Willian Reid is a 72 year old male who presents with back pain that started Tuesday after eating a banana cream pie at [employer] [facility].  Pt been having nonstop pain ever since.  +nausea; +epigastric discomfort.  Never had pain like this before.  Tried not eating; some OTC pain meds with no improvement.  No one is family with similar symptoms.  Pt admitted to similar \"ingestion\" in the past; but never been this bad.  He denies any severe pains currently.  No lower abdominal discomfort.  Does have a history of hernia surgery of the abdomen, in addition to bowel surgery after diverticulitis years ago.  No other abdominal surgeries.  No fever.  No chills.  No ill contacts.  No cough.  No blood thinner.      ED work-up noted leukocytosis with CT followed by MRI to confirm acute cholecystitis with stones noted those imagings; thus general surgery was consulted.     Primary Care Physician:  Amy Chandler     Allergies:    Allergies   Allergen Reactions     Tetracycline Rash        Outpatient Meds:  (Not in a hospital admission)      Past Medical History:  Past Medical History:   Diagnosis Date     Diverticulitis of colon (without mention of hemorrhage)(832.11)         Past Surgical History:  Past Surgical History:   Procedure Laterality Date     FLEXIBLE SIGMOIDOSCOPY  3/2/2005     Flexible sigmoidoscopy probably should be changed to a colonoscopy at his next visit to get further and to help him with sedation, so in  he should have a colonoscopy and then every 10 years afterwards        Social History:  Social History     Socioeconomic History     Marital status:      Spouse name: Not on file     Number of children: Not on file "     Years of education: Not on file     Highest education level: Not on file   Occupational History     Not on file   Tobacco Use     Smoking status: Never Smoker     Smokeless tobacco: Never Used   Vaping Use     Vaping Use: Never used   Substance and Sexual Activity     Alcohol use: No     Drug use: No     Sexual activity: Yes     Partners: Female   Other Topics Concern     Parent/sibling w/ CABG, MI or angioplasty before 65F 55M? No   Social History Narrative     Not on file     Social Determinants of Health     Financial Resource Strain: Low Risk      Difficulty of Paying Living Expenses: Not hard at all   Food Insecurity: No Food Insecurity     Worried About Running Out of Food in the Last Year: Never true     Ran Out of Food in the Last Year: Never true   Transportation Needs: No Transportation Needs     Lack of Transportation (Medical): No     Lack of Transportation (Non-Medical): No   Physical Activity: Sufficiently Active     Days of Exercise per Week: 7 days     Minutes of Exercise per Session: 60 min   Stress: Stress Concern Present     Feeling of Stress : Very much   Social Connections: Socially Integrated     Frequency of Communication with Friends and Family: More than three times a week     Frequency of Social Gatherings with Friends and Family: Three times a week     Attends Voodoo Services: More than 4 times per year     Active Member of Clubs or Organizations: Yes     Attends Club or Organization Meetings: Not on file     Marital Status:    Intimate Partner Violence: Not on file   Housing Stability: Low Risk      Unable to Pay for Housing in the Last Year: No     Number of Places Lived in the Last Year: 1     Unstable Housing in the Last Year: No       Family History:  Family History   Problem Relation Age of Onset     Osteoporosis Mother      Cancer Mother         facial skin cancers removed     Arthritis Mother         OA     Heart Disease Father      Diabetes Father      Eye Disorder  "Father      C.A.D. Father      Cancer Maternal Grandmother      Cancer Paternal Grandmother      Heart Disease Paternal Grandfather      Hypertension Maternal Uncle      Cerebrovascular Disease Maternal Uncle      Colon Cancer Cousin 76     Asthma No family hx of      Cancer - colorectal No family hx of      Prostate Cancer No family hx of      Breast Cancer No family hx of             Review of Systems:     Constitutional: Denies fever or chills   Eyes: Denies change in visual acuity   HENT: Denies nasal congestion or sore throat   Respiratory: Denies cough or shortness of breath   Cardiovascular: Denies chest pain or edema   GI: Denies  vomiting, bloody stools or diarrhea; +abdominal pain, nausea, dry heaves  : Denies dysuria   Musculoskeletal: Denies joint pain; +back pain   Integument: Denies rash   Neurologic: Denies headache, focal weakness or sensory changes   Endocrine: Denies polyuria or polydipsia   Lymphatic: Denies swollen glands   Psychiatric: Denies depression or anxiety     Objective:     Vital Signs:  /45   Pulse 52   Temp 97.9  F (36.6  C) (Oral)   Resp (!) 31   Ht 1.676 m (5' 6\")   Wt 72.6 kg (160 lb)   SpO2 95%   BMI 25.82 kg/m      Physical Exam:   Physical Exam  Vitals reviewed.   Eyes:      Conjunctiva/sclera: Conjunctivae normal.   Cardiovascular:      Pulses: Normal pulses.   Pulmonary:      Effort: Pulmonary effort is normal.   Abdominal:      General: There is distension.      Palpations: Abdomen is soft.      Tenderness: There is abdominal tenderness. There is no guarding.   Musculoskeletal:         General: Normal range of motion.      Cervical back: Normal range of motion.   Skin:     General: Skin is warm.      Capillary Refill: Capillary refill takes less than 2 seconds.   Neurological:      General: No focal deficit present.      Mental Status: He is alert.   Psychiatric:         Mood and Affect: Mood normal.       Pre-operative labs and imaging, if any, were " reviewed.  EXAM: CT ABDOMEN PELVIS W CONTRAST  LOCATION: [facility]  DATE/TIME: 6/9/2022 6:10 AM     INDICATION: Abdominal distension.  COMPARISON: Ultrasound abdominal aorta 11/20/2021.  TECHNIQUE: CT scan of the abdomen and pelvis was performed following injection of IV contrast. Multiplanar reformats were obtained. Dose reduction techniques were used.  CONTRAST: 79 mL Isovue 370 IV.     FINDINGS:   LOWER CHEST: Calcified granuloma right lower lobe. Adjacent uncalcified 0.3 cm subpleural nodule right lower lobe (image 5, series 2). Minor strands of linear atelectasis right base. No pleural effusion on either side. Small hiatal hernia. Normal cardiac   size. No pericardial effusion.     HEPATOBILIARY: Dependent gallstones. The gallbladder is distended. Suggestion of tiny stones in the distal CBD (images 71-79, series 5, images 51-53, series 4, images 46-48, series 3). Consider dedicated ERCP or MRCP for further assessment. No discrete   hepatic lesion. Patent hepatic and portal veins.     PANCREAS: Normal.     SPLEEN: Calcified granuloma in the spleen.     ADRENAL GLANDS: Normal.     KIDNEYS/BLADDER: No urinary tract calculi. Cortical simple cyst in each kidney, no further follow-up required. Both kidneys are well perfused without hydronephrosis or hydroureter. Normal urinary bladder. Heterogeneous prostatomegaly bulging into the   bladder base.     BOWEL: Gas-filled duodenal diverticulum medially adjacent to the head of the pancreas. Small hiatal hernia. Colonic diverticulosis, more apparent distally, without acute inflammation. Normal appendix. Rectosigmoid anastomotic staples.     LYMPH NODES: No suspicious abdominopelvic adenopathy.     VASCULATURE: Normal caliber mildly atherosclerotic distal abdominal aorta measuring 1.7 x 1.7 cm (image 91, series 5). Normal caliber IVC.     PELVIC ORGANS: Normal appendix. Rectosigmoid diverticulosis without acute inflammation. Rectosigmoid  anastomotic staples. No adenopathy or free fluid. Prostatomegaly.     MUSCULOSKELETAL: Degenerative spine and joints of the pelvis.                                                                      IMPRESSION:   1.  Distended gallbladder containing dependent gallstones. Suggestion of tiny stones in the distal CBD. Consider dedicated ERCP or MRCP for further assessment.     2.  Cortical simple cyst in each kidney, no further follow-up required. Heterogeneous prostatomegaly bulging into the bladder base.     3.  Rectosigmoid anastomotic staples. Distal colonic diverticulosis without acute inflammation. Duodenal diverticulum medially adjacent to the head of the pancreas.     4.  Evidence of remote granulomatous disease. Uncalcified tiny nodule right lower lobe.      REFERENCE:  Guidelines for Management of Incidental Pulmonary Nodules Detected on CT Images: From the Fleischner Society 2017.   Guidelines apply to incidental nodules in patients who are 35 years or older.  Guidelines do not apply to lung cancer screening, patients with immunosuppression, or patients with known primary cancer.     SINGLE NODULE  Nodule size <6 mm  Low-risk patients: No follow-up needed.  High-risk patients: Optional follow-up at 12 months.     Nodule size 6-8 mm  Low-risk patients: Follow-up CT at 6-12 months, then consider CT at 18-24 months.  High-risk patients: Follow-up CT at 6-12 months, then at 18-24 months if no change.     Nodule size >8 mm  Either low or high-risk patients:  Consider CT, PET/CT, or tissue sampling at 3 months.     Consider referral to lung nodule clinic.         MRCP Without Contrast     CLINICAL HISTORY: Right upper quadrant pain. Cholelithiasis. Normal  bilirubin and hepatic panel.     DATE: 6/9/2022 11:37 AM     TECHNIQUE: MRI of the abdomen without contrast and 3-D MRCP.     Comparison study: CT of the abdomen and pelvis earlier today     FINDINGS:  No intrahepatic or extrahepatic biliary ductal dilatation.  No filling  defects or calculi in the extrahepatic bile duct. Normal variant low  medial insertion of the cystic duct (series 6, image 25).  Cholelithiasis. Mild fluid and T2 hyperintense signal around the  gallbladder neck correlating to the fat stranding seen on CT. Findings  remain suspicious for acute calculus cholecystitis. No surrounding  fluid collections.     No dilatation of the main pancreatic duct. Normal pancreatic  parenchymal signal. No significant hepatic steatosis. Mild edema in  the left hepatic lobe, segment IVB likely related to gallbladder  inflammation best seen on the diffusion-weighted images (series 14,  image 55). Otherwise, the liver, spleen, adrenal glands and pancreas  are within normal limits on noncontrast MRI. Few simple cysts in the  kidneys and otherwise normal kidneys. Small hiatal hernia. Small  duodenal diverticulum at the ampulla. Visualized loops of small bowel  and colon are normal in caliber. No abdominal lymphadenopathy. No  abdominal ascites.                                                                      IMPRESSION:   1. Findings consistent with acute calculus cholecystitis as seen on  the CT earlier today.  2. No biliary ductal dilatation or choledocholithiasis.  3. Normal variant low, medial insertion of the cystic duct on the  extrahepatic common bile duct.     KAREN MALIK MD         SYSTEM ID:  [id number]    Assessment:     Acute on chronic calculus cholecystitis  BMI 25  History of colon resection?      Plan:       The patient was informed that the proposed procedure or medical intervention involves removal of the gallbladder laparoscopically (with small incisions and camera) possibly open and does offer a very good likelihood of symptom relief.     The patient was made aware of the risks of the procedure, including but not limited to:    bleeding, conversion to open, bile leak, bile duct injury or other adjacent organ injury, retained gallstones, cardiac or  pulmonary related complications and anesthesia complications. Also that difficulties may be encountered during recovery to include: wound or deep intraabdominal infection, wound dehiscence, incisional hernia, bile leak or retained stone requiring ERCP.     In the course of the evaluation we did discuss other therapeutic options with the patient, including antibiotics and/or drainage. The risks and benefits of these options were also discussed which include but are not limited to: recurrent worsening episodes.     Also discussed were possible problems or difficulties the patient may encounter if treatment was not pursued at this time. These include: worsening episodes, cholangitis and/or pancreatitis.     The patient was informed that Joya Bains DO will be primarily responsible for the procedure. Assistance during the procedure and during hospitalization may also be provided by other physicians, nurses and technicians.     The patient was also informed that if exposure to the patient s blood or body fluids occurs during the procedure, HIV testing of the patient will occur unless they refuse at this time. Risk of blood transfusion is minimal.     The patient will be provided additional education resources by the support staff. If there are ever any questions regarding their diagnosis or the procedure, the patient is encouraged to ask.     All of the patient s or their legal representative s questions have been answered to their satisfaction and they have indicated a clear understanding of this discussion.   Willian expressed understanding of risks, benefits and alternatives and wished to proceed.     All findings, test results, and diagnosis were discussed with the patient. Willian  participated in the decision making process and agreed with the plan of care. Questions were sought and answered.                 Electronically signed by:  Joya Bains MD  6/9/2022   1:47 PM   Disclaimer: This note consists of words  and symbols derived from keyboarding and dictation using voice recognition software.  As a result, there may be errors that have gone undetected.  Please consider this when interpreting information found in this note.

## 2022-06-09 NOTE — PROGRESS NOTES
Secondary skin assessment  Admitting nurse completed full skin assessment, Senthil score and Senthil interventions. This writer agrees with the initial skin assessment findings.    Marilee Terrazas RN on 6/9/2022 at 6:30 PM

## 2022-06-09 NOTE — ED PROVIDER NOTES
"  History     Chief Complaint   Patient presents with     Back Pain     Chest Pain     Abdominal Pain     HPI  Willian Reid is a 72 year old male who has past medical history significant for hypertension, GERD, presenting the emergency department with concerns regarding onset of epigastric abdominal pain that began yesterday afternoon, approximately 15 hours prior to ED arrival.  Patient states that after eating pie yesterday afternoon he began experiencing abdominal bloating sensation with upper abdominal, in addition to upper back pain.  This pain seemed to radiate towards the chest, causing sensations of \"indigestion.\"  Patient states episodes of nausea, with some dry heaving which is occurred during the overnight hours.  Since he had ongoing symptoms of nausea, and distended abdomen, patient presents to the emergency department.  He denies any severe pains currently.  No lower abdominal discomfort.  Does have a history of hernia surgery of the abdomen, in addition to bowel surgery after diverticulitis years ago.  No other abdominal surgeries.  No fever.  No chills.  No ill contacts.  No cough.    Allergies:  Allergies   Allergen Reactions     Tetracycline Rash       Problem List:    Patient Active Problem List    Diagnosis Date Noted     Bronchitis with bronchospasm 11/10/2021     Priority: Medium     Seasonal allergic rhinitis, unspecified trigger 11/10/2021     Priority: Medium     Cough and wheeze in summer thought due to allergies; occ use of albuterol inhaler       Situational anxiety 04/16/2018     Priority: Medium     Prediabetes 10/07/2011     Priority: Medium     Glucose 109, October, 2011.        Hypertriglyceridemia 03/31/2011     Priority: Medium     240 in Sept, 2010. Dietary therapy and check the lab annually.        CARDIOVASCULAR SCREENING; LDL GOAL LESS THAN 130 10/31/2010     Priority: Medium     Essential hypertension 03/02/2009     Priority: Medium     Plantar fasciitis 07/30/2008     " Priority: Medium     Other tenosynovitis of hand and wrist 04/20/2007     Priority: Medium     Degeneration of lumbar or lumbosacral intervertebral disc 04/20/2007     Priority: Medium     Esophageal reflux 04/20/2007     Priority: Medium        Past Medical History:    Past Medical History:   Diagnosis Date     Diverticulitis of colon (without mention of hemorrhage)(562.11) 1980       Past Surgical History:    Past Surgical History:   Procedure Laterality Date     FLEXIBLE SIGMOIDOSCOPY  3/2/2005     Flexible sigmoidoscopy probably should be changed to a colonoscopy at his next visit to get further and to help him with sedation, so in 2010 he should have a colonoscopy and then every 10 years afterwards       Family History:    Family History   Problem Relation Age of Onset     Osteoporosis Mother      Cancer Mother         facial skin cancers removed     Arthritis Mother         OA     Heart Disease Father      Diabetes Father      Eye Disorder Father      C.A.D. Father      Cancer Maternal Grandmother      Cancer Paternal Grandmother      Heart Disease Paternal Grandfather      Hypertension Maternal Uncle      Cerebrovascular Disease Maternal Uncle      Colon Cancer Cousin 76     Asthma No family hx of      Cancer - colorectal No family hx of      Prostate Cancer No family hx of      Breast Cancer No family hx of        Social History:  Marital Status:   [2]  Social History     Tobacco Use     Smoking status: Never Smoker     Smokeless tobacco: Never Used   Vaping Use     Vaping Use: Never used   Substance Use Topics     Alcohol use: No     Drug use: No        Medications:    albuterol (PROAIR HFA/PROVENTIL HFA/VENTOLIN HFA) 108 (90 Base) MCG/ACT inhaler  atenolol (TENORMIN) 100 MG tablet  famotidine (PEPCID) 40 MG tablet  losartan (COZAAR) 50 MG tablet  MULTIVITAMINS OR TABS  omeprazole (PRILOSEC) 20 MG DR capsule  Probiotic Product (PRO-BIOTIC BLEND) CAPS          Review of Systems   Constitutional:  "Negative for fever.   Respiratory: Negative for shortness of breath.    Cardiovascular: Negative for chest pain.   Gastrointestinal: Positive for abdominal pain, nausea and vomiting.   All other systems reviewed and are negative.      Physical Exam   BP: (!) 183/98  Pulse: 61  Resp: 18  Height: 167.6 cm (5' 6\")  Weight: 72.6 kg (160 lb)  SpO2: 97 %      Physical Exam  BP (!) 183/98   Pulse 61   Resp 18   Ht 1.676 m (5' 6\")   Wt 72.6 kg (160 lb)   SpO2 97%   BMI 25.82 kg/m    General: alert, interactive, in no apparent distress  Head: atraumatic  Nose: no rhinorrhea or epistaxis  Ears: no external auditory canal discharge or bleeding.    Eyes: Sclera nonicteric. Conjunctiva noninjected. PERRL, EOMI  Mouth: no tonsillar erythema, edema, or exudate  Neck: supple, no palp LAD  Lungs: CTAB  CV: RRR, S1/S2; peripheral pulses palpable and symmetric  Abdomen: soft, slightly distended.  No significant tenderness to palpation, no guarding or rebound. Positive bowel sounds  Extremities: no cyanosis or edema  Skin: no rash or diaphoresis  Neuro:  strength 5/5 in UE and LEs bilaterally, sensation intact to light touch in UE and LEs bilaterally;       ED Course                 Procedures         EKG, reviewed by myself shows sinus bradycardia.  Rate 59 bpm.  Normal intervals.  No ectopy.  No acute ischemic appearing changes.    Critical Care time:  none               Results for orders placed or performed during the hospital encounter of 06/09/22 (from the past 24 hour(s))   CBC with platelets differential    Narrative    The following orders were created for panel order CBC with platelets differential.  Procedure                               Abnormality         Status                     ---------                               -----------         ------                     CBC with platelets and d...[[id number]]                      In process                   Please view results for these tests on the individual orders. "   [place] Draw    Narrative    The following orders were created for panel order [place] Draw.  Procedure                               Abnormality         Status                     ---------                               -----------         ------                     Extra Blue Top Tube[[id number]]                                                           Please view results for these tests on the individual orders.       Medications   ondansetron (ZOFRAN) injection 4 mg (has no administration in time range)       Assessments & Plan (with Medical Decision Making)  72 year old male with past medical history significant for hypertension, GERD, presenting the emergency department with concerns regarding epigastric abdominal pain with some radiation towards the back.  Also with nausea, vomiting, and now dry heaving.  Differential includes gastritis, GERD, esophagitis, hepatitis, cholecystitis, biliary colic, pancreatitis, bowel obstruction, ACS.    IV established, labs drawn.  EKG ordered, reviewed by myself and shows no acute ischemic appearing changes.    Laboratory work-up ordered and pending.  Patient will be signed out to oncoming daytime provider pending laboratory work-up, and CT scan of the abdomen/pelvis is ordered to further characterize the epigastric, and upper back pains.     I have reviewed the nursing notes.    I have reviewed the findings, diagnosis, plan and need for follow up with the patient.       New Prescriptions    No medications on file       Final diagnoses:   Abdominal pain, epigastric   Abdominal bloating   Essential hypertension   Gastroesophageal reflux disease, unspecified whether esophagitis present       6/9/2022   [facility] EMERGENCY DEPT     Kirk Laura MD  06/09/22 0549

## 2022-06-09 NOTE — OP NOTE
OPERATIVE NOTE  [facility] SURGERY    DATE:  6/9/2022    SURGEON:  Joya Bains DO      FIRST ASSIST:    Ankit Bundy PA-C (needed for retraction, suction, running laparoscope in this complex case)      PREOPERATIVE DIAGNOSIS:  Acute cholecystitis with chronic cholecystitis [K81.2]    POSTOPERATIVE DIAGNOSIS:  Acute purulent calculus cholecystitis    OPERATION:  Laparoscopic cholecystectomy  Modifier 22.    ANESTHESIA:  General endotracheal.    INDICATIONS FOR PROCEDURE:  The patient is a 72 year old year old male with acute calculus cholecystitis.  Based on this, laparoscopic cholecystectomy was recommended.    FINDINGS:  Gallbladder hydrop.  Acute cholecystitis.  There is portion of the tissue at the infundibulum that had purulent fluid as we grasped this area.  There was a rent that was made as the tissue was so friable.  There was numerous stone spillage.  Abdominal washout was done and all visible stones were removed.     PROCEDURE IN DETAIL: The patient was preoperatively identified. Consent was signed and placed on the chart. He was brought back to the operative suite where he was laid supine on the operating table. General endotracheal anesthesia was induced per anesthesia protocol. She/he was then prepped and draped in sterile fashion. We started by infiltrating 5 cc of local anesthetic in the right upper quadrant area and made small skin incision and accessing the abdominal cavity by using Optiview trocar and 5-mm trocar site visualizing all layers of the abdominal wall until we reached the peritoneal cavity. We then insufflated  with CO2 gas to create pneumoperitoneum. A second right subcostal port was inserted, also 5 mm, as well as subxiphoid at supraumbilical site. All were 5 mm ports except the subxiphoid. All were inserted under direct visualization and all sites were preanesthetized with local anesthetic prior to the insertion of the trocar. We then grabbed the fundus of the gallbladder,  retracted it anteriorly and cephalad.  This was only achievable after 60 cc of hydropic fluid was removed from this distended and tense erythematous gallbladder.  There were large number of adhesions to the greater omentum and the gallbladder. These were bluntly taken down. After we had successfully taken down these adhesions, we then grabbed the neck of the gallbladder, retracted it laterally, and undertook dissection of the Calot's triangle. We identified the duct and artery, skeletonized the structures, clipped them proximally and distally, and ligated them with EndoShears. The gallbladder was then dissected from the liver bed using electrocautery.   During the process there was noted some purulent output from the gallbladder infundibulum as a rent was made due to the friable condition of the tissue.  Numerous stones that was spilled out during the process.  The gallbladder was retrieved through the epigastic port, and the trochar replaced. Once pneumoperitoneum was reestablished, the gallbladder bed was inspected for bleeding and bile leak and neither were found.  Numerous stones was then found and removed.  Most visible stones if not all were removed with a second bag.  The patient was positioned flat, irrigant solution was suctioned free, and pneumoperitoneum was relieved before removing the trocars.   The process adds approximately 20 to 30 minutes addition to our case.  Tissue was friable, thus the field was bloody, and rents were made in the gallbladder due to the friability of the tissue.    Hemostasis was noted to be excellent at the conclusion of the case. We closed the 12mm trocar transfascially with a 0-vicryl on a UR6. All irrigant that could be visualized was removed from the abdomen. We then desufflated the abdomen, reduced pneumoperitoneum, and removed the trocars under direct visualization. We then undertook skin closure with interrupted Monocryl sutures in subcuticular fashion. The patient  tolerated the procedure well and was brought to PACU in stable condition.        COMPLICATIONS: None.    ESTIMATED BLOOD LOSS: 10cc    SPECIMENS: Gallbladder.    DISPOSITION: Stable to PACU; patient will be observed overnight; he will be given IV antibiotic overnight, labs will be rechecked in the morning.  Pending on how he does, he may or may not need oral antibiotics to go home.    [facility], DO

## 2022-06-10 VITALS
WEIGHT: 163.58 LBS | TEMPERATURE: 97.5 F | DIASTOLIC BLOOD PRESSURE: 49 MMHG | SYSTOLIC BLOOD PRESSURE: 131 MMHG | HEART RATE: 63 BPM | OXYGEN SATURATION: 98 % | BODY MASS INDEX: 26.29 KG/M2 | RESPIRATION RATE: 16 BRPM | HEIGHT: 66 IN

## 2022-06-10 LAB
ALBUMIN SERPL-MCNC: 3.1 G/DL (ref 3.4–5)
ALP SERPL-CCNC: 61 U/L (ref 40–150)
ALT SERPL W P-5'-P-CCNC: 45 U/L (ref 0–70)
ANION GAP SERPL CALCULATED.3IONS-SCNC: 5 MMOL/L (ref 3–14)
AST SERPL W P-5'-P-CCNC: 42 U/L (ref 0–45)
BILIRUB SERPL-MCNC: 0.7 MG/DL (ref 0.2–1.3)
BUN SERPL-MCNC: 16 MG/DL (ref 7–30)
CALCIUM SERPL-MCNC: 8.4 MG/DL (ref 8.5–10.1)
CHLORIDE BLD-SCNC: 111 MMOL/L (ref 94–109)
CO2 SERPL-SCNC: 25 MMOL/L (ref 20–32)
CREAT SERPL-MCNC: 1.01 MG/DL (ref 0.66–1.25)
GFR SERPL CREATININE-BSD FRML MDRD: 78 ML/MIN/1.73M2
GLUCOSE BLD-MCNC: 125 MG/DL (ref 70–99)
HOLD SPECIMEN: NORMAL
LACTATE SERPL-SCNC: 1.2 MMOL/L (ref 0.7–2)
MAGNESIUM SERPL-MCNC: 2.5 MG/DL (ref 1.6–2.3)
PHOSPHATE SERPL-MCNC: 2.5 MG/DL (ref 2.5–4.5)
POTASSIUM BLD-SCNC: 4.5 MMOL/L (ref 3.4–5.3)
PROT SERPL-MCNC: 7.4 G/DL (ref 6.8–8.8)
SODIUM SERPL-SCNC: 141 MMOL/L (ref 133–144)

## 2022-06-10 PROCEDURE — 80053 COMPREHEN METABOLIC PANEL: CPT | Performed by: SURGERY

## 2022-06-10 PROCEDURE — 84100 ASSAY OF PHOSPHORUS: CPT | Performed by: SURGERY

## 2022-06-10 PROCEDURE — 250N000011 HC RX IP 250 OP 636: Performed by: SURGERY

## 2022-06-10 PROCEDURE — 83735 ASSAY OF MAGNESIUM: CPT | Performed by: SURGERY

## 2022-06-10 PROCEDURE — 36415 COLL VENOUS BLD VENIPUNCTURE: CPT | Performed by: SURGERY

## 2022-06-10 PROCEDURE — 250N000013 HC RX MED GY IP 250 OP 250 PS 637: Performed by: SURGERY

## 2022-06-10 PROCEDURE — 83605 ASSAY OF LACTIC ACID: CPT | Performed by: SURGERY

## 2022-06-10 RX ORDER — OXYCODONE HYDROCHLORIDE 5 MG/1
5 TABLET ORAL EVERY 6 HOURS PRN
Qty: 12 TABLET | Refills: 0 | Status: SHIPPED | OUTPATIENT
Start: 2022-06-10 | End: 2022-06-13

## 2022-06-10 RX ADMIN — AMPICILLIN SODIUM AND SULBACTAM SODIUM 3 G: 2; 1 INJECTION, POWDER, FOR SOLUTION INTRAMUSCULAR; INTRAVENOUS at 06:16

## 2022-06-10 RX ADMIN — LOSARTAN POTASSIUM 50 MG: 50 TABLET, FILM COATED ORAL at 08:45

## 2022-06-10 RX ADMIN — FAMOTIDINE 20 MG: 20 TABLET, FILM COATED ORAL at 08:44

## 2022-06-10 RX ADMIN — AMPICILLIN SODIUM AND SULBACTAM SODIUM 3 G: 2; 1 INJECTION, POWDER, FOR SOLUTION INTRAMUSCULAR; INTRAVENOUS at 00:11

## 2022-06-10 RX ADMIN — PANTOPRAZOLE SODIUM 40 MG: 40 TABLET, DELAYED RELEASE ORAL at 06:17

## 2022-06-10 RX ADMIN — SENNOSIDES AND DOCUSATE SODIUM 1 TABLET: 50; 8.6 TABLET ORAL at 08:44

## 2022-06-10 RX ADMIN — ATENOLOL 100 MG: 100 TABLET ORAL at 08:44

## 2022-06-10 RX ADMIN — ACETAMINOPHEN 975 MG: 325 TABLET, FILM COATED ORAL at 06:17

## 2022-06-10 NOTE — PLAN OF CARE
"Goal Outcome Evaluation:  Pt is A/O, able to make needs known.   Blood pressure 104/47, pulse 56, temperature 97.4  F (36.3  C), temperature source Oral, resp. rate 15, height 1.676 m (5' 6\"), weight 74.2 kg (163 lb 9.3 oz), SpO2 93 %.  Pain is controlled with scheduled medications.   Abdominal incision sited are clean, dry, intact.   Tolerating PO intake.   IV fluids and antibiotic infusing as ordered.  Lactic acid will be redrawn this AM.   Voiding spontaneously, no BM.   Up with one assist to bathroom.   Continue to assess per plan of care.                    "

## 2022-06-10 NOTE — DISCHARGE INSTRUCTIONS
Home Care Following Gallbladder Surgery     [facility]    Pain meds:   600mg ibuprofen every 6hrs prn   650mg of acetaminophen every 6hrs prn  You can alternate these meds so that you take one every 3 hrs.    Make sure you do not go over 4000mg of acetaminophen every 24hrs, especially if you are taking Norco or percocet 5/325mg.    Care of the Incision:  Remove gauze dressing (if present) after 48 hours.  Leave small strips in place (if present).  They will gradually come off.  If surgical glue was used on your incision, keep it dry for 24 hours.  Then you may shower but don t submerge under water for at least 2 weeks.  Gently pat your incision dry with a freshly laundered towel.  Do not touch your incision with bare hands or pick at scabs.  Leave your incision open to air.  Cover it only if draining, clothing rubs or irritates it.    Activity:  Gradually increase your activity.  Walk short distances several times each day and increase the distance as your strength allows.  To promote circulation, do not cross your legs while sitting.  No strenuous lifting or straining for 2-3 weeks.  Do not lift anything over 10-20 pounds until your doctor approves an increase.  Return to work will be determined by the type of work you do and should be discussed with your physician.  Do not drive or operate equipment while taking prescription pain medicines.  You may drive 1 week after surgery if you have stopped taking prescription pain medicines and can react quickly enough to make an emergency stop if necessary.    Diet:  Return to the diet you were on before surgery.  Drink plenty of water.  Avoid foods that cause constipation.    REMEMBER--most prescription pain pills cause constipation.  Walking, extra fluids, and increased fiber (fresh fruits and vegetables, etc.) are natural remedies for constipation.  You can also take mineral oil, 1-2 Tablespoons per day.  If still constipated you may try a stool softener such as  Colace or Miralax.    Call Your Physician if You Have:  Redness, increased swelling or cloudy drainage from your incision.  A temperature of more than 101 degrees F.  Worsening pain in your incision not relieved by your prescription pain pills and/or a short rest.  Jaundice (yellowing of skin or eyes)  Abdominal distention (stomach getting very large)  Swelling in your legs  Productive cough  Burning with urination  Any questions or concerns about your recovery, please call       Business hours [phone]     After hours [phone]  Nurse Advice Line (24 hours a day)    Follow-up Care:  Make an appointment 1-2 week after your surgery.  Call [phone].

## 2022-06-10 NOTE — PROGRESS NOTES
"[facility] ADMISSION NOTE    Patient admitted to room 2307 at approximately 1820 via cart from surgery. Patient was accompanied by spouse and nurse.     Verbal SBAR report received from LEELA Fields prior to patient arrival.     Patient trasferred to bed via air sourav. Patient alert and oriented X 4. Pain is controlled without any medications.  . Admission vital signs: Blood pressure 121/69, pulse 65, temperature 97.9  F (36.6  C), temperature source Oral, resp. rate 17, height 1.676 m (5' 6\"), weight 74.2 kg (163 lb 9.3 oz), SpO2 94 %. Patient was oriented to plan of care, call light, bed controls, tv, telephone, bathroom and visiting hours.     Risk Assessment    The following safety risks were identified during admission: fall. Yellow risk band applied: YES.     Skin Initial Assessment    This writer admitted this patient and completed a full skin assessment and Senthil score in the Adult PCS flowsheet. Appropriate interventions initiated as needed.     Secondary skin check completed by LEELA Hunt.     Education    Patient has a [place] to Observation order: No  Observation education completed and documented: N/A      Sherie Gillespie RN    "

## 2022-06-10 NOTE — PROGRESS NOTES
Patient alert and oriented x 4. Became hypotensive at approximately 2000; Dr. Bains was notified and gave verbal orders for a 1 L NS bolus. 1 L bolus infused and BP returned to baseline. Vitals otherwise stable. Sats 94 - 100% on 1 LPM via nasal cannula. Four lap sits on abdomen are approximated; liquid bandage intact with no drainage.  Patient triggered sepsis alert; lactic was 2.4. Dr. Bains was notified and ordered a continuous NS infusion.   Patient has tolerated regular diet and PO fluids well.  Patient ambulated to the bathroom x 2, with assist of 1 and gait belt. He has voided x 4, but feels he is unable to fully empty his bladder.     Temp: 97.9  F (36.6  C) Temp src: Oral BP: 112/62 Pulse: 65   Resp: 16 SpO2: 92 % O2 Device: Nasal cannula Oxygen Delivery: 1 LPM

## 2022-06-10 NOTE — DISCHARGE SUMMARY
[facility] Discharge Summary    Willian Reid MRN# [id number]   Age: 72 year old YOB: 1950     Date of Admission:  6/9/2022  Date of Discharge::  6/10/2022   Admitting Physician:  Joya Bains MD  Discharge Physician:  Joya Bains MD     Home clinic:  [facility]  Primary doctor:   Amy Chandler    Admission Diagnoses:  Abdominal pain, epigastric [R10.13]  Abdominal bloating [R14.0]  Essential hypertension [I10]  Acute cholecystitis with chronic cholecystitis [K81.2]  Gastroesophageal reflux disease, unspecified whether esophagitis present [K21.9]      Discharge Diagnoses:  Same    Procedures:  Laparoscopic cholecystectomy     Medications prior to Admission:  Medications Prior to Admission   Medication Sig Dispense Refill Last Dose     albuterol (PROAIR HFA/PROVENTIL HFA/VENTOLIN HFA) 108 (90 Base) MCG/ACT inhaler Inhale 2 puffs into the lungs every 4 hours as needed for shortness of breath / dyspnea or wheezing 18 g 11 6/8/2022 at Unknown time     atenolol (TENORMIN) 100 MG tablet Take 1 tablet (100 mg) by mouth daily 90 tablet 3 6/9/2022 at Unknown time     famotidine (PEPCID) 40 MG tablet Take 1 tablet (40 mg) by mouth daily 90 tablet 3 6/9/2022 at Unknown time     losartan (COZAAR) 50 MG tablet Take 1 tablet (50 mg) by mouth daily 90 tablet 3 6/9/2022 at Unknown time     MULTIVITAMINS OR TABS 1 TABLET ORALLY DAILY 100 3 6/9/2022 at Unknown time     omeprazole (PRILOSEC) 20 MG DR capsule Take 1 capsule (20 mg) by mouth every other day for 14 days, THEN 1 capsule (20 mg) twice a week for 14 days. 90 capsule 3 6/8/2022 at Unknown time     Probiotic Product (PRO-BIOTIC BLEND) CAPS Take 1 capsule by mouth daily   6/8/2022 at Unknown time       Medications on Discharge:  Current Discharge Medication List      START taking these medications    Details   oxyCODONE (ROXICODONE) 5 MG tablet Take 1 tablet (5 mg) by mouth every 6 hours as needed for pain  Qty: 12 tablet, Refills: 0    Associated  Diagnoses: Acute cholecystitis with chronic cholecystitis; S/P laparoscopic cholecystectomy         CONTINUE these medications which have NOT CHANGED    Details   albuterol (PROAIR HFA/PROVENTIL HFA/VENTOLIN HFA) 108 (90 Base) MCG/ACT inhaler Inhale 2 puffs into the lungs every 4 hours as needed for shortness of breath / dyspnea or wheezing  Qty: 18 g, Refills: 11    Comments: Pharmacy may dispense brand covered by insurance (Proair, or proventil or ventolin or generic albuterol inhaler)  Associated Diagnoses: Seasonal allergic rhinitis, unspecified trigger      atenolol (TENORMIN) 100 MG tablet Take 1 tablet (100 mg) by mouth daily  Qty: 90 tablet, Refills: 3    Associated Diagnoses: Essential hypertension      famotidine (PEPCID) 40 MG tablet Take 1 tablet (40 mg) by mouth daily  Qty: 90 tablet, Refills: 3    Associated Diagnoses: Gastroesophageal reflux disease without esophagitis      losartan (COZAAR) 50 MG tablet Take 1 tablet (50 mg) by mouth daily  Qty: 90 tablet, Refills: 3    Associated Diagnoses: Essential hypertension      MULTIVITAMINS OR TABS 1 TABLET ORALLY DAILY  Qty: 100, Refills: 3    Associated Diagnoses: Hypertension      omeprazole (PRILOSEC) 20 MG DR capsule Take 1 capsule (20 mg) by mouth every other day for 14 days, THEN 1 capsule (20 mg) twice a week for 14 days.  Qty: 90 capsule, Refills: 3    Associated Diagnoses: Gastroesophageal reflux disease without esophagitis      Probiotic Product (PRO-BIOTIC BLEND) CAPS Take 1 capsule by mouth daily           Consultations:  No consultations were requested during this admission    Brief History of Illness:  71 yo M with back pain and bloating.  Work-up revealed acute cholecystitis.     Hospital Course:  Pt underwent lap yudy on 6/10/2022; admitted overnight due to the acute nature of case and some purulent discharge from gallbladder; thus IV abx was warranted.     Discharge Exam:    /49 (BP Location: Left arm)   Pulse 63   Temp 97.5  F  "(36.4  C) (Oral)   Resp 16   Ht 1.676 m (5' 6\")   Wt 74.2 kg (163 lb 9.3 oz)   SpO2 98%   BMI 26.40 kg/m      GENERAL APPEARANCE: healthy, alert and no distress  ABDOMEN: soft, mildly distended; nontender, without hepatosplenomegaly or masses, bowel sounds normal and incisions CDI.       Attestation:  I have reviewed today's vital signs, notes, medications, labs and imaging.  Amount of time performed on this discharge summary: 15 minutes.    Joya Bains MD    "

## 2022-06-10 NOTE — ANESTHESIA POSTPROCEDURE EVALUATION
Patient: Willian Reid    Procedure: Procedure(s):  CHOLECYSTECTOMY, LAPAROSCOPIC       Anesthesia Type:  General    Note:  Disposition: Outpatient   Postop Pain Control: Uneventful            Sign Out: Well controlled pain   PONV: No   Neuro/Psych: Uneventful            Sign Out: Acceptable/Baseline neuro status   Airway/Respiratory: Uneventful            Sign Out: Acceptable/Baseline resp. status   CV/Hemodynamics: Uneventful            Sign Out: Acceptable CV status; No obvious hypovolemia; No obvious fluid overload   Other NRE: NONE   DID A NON-ROUTINE EVENT OCCUR? No           Last vitals:  Vitals Value Taken Time   /67 06/09/22 1745   Temp 36.6  C (97.9  F) 06/09/22 1636   Pulse 73 06/09/22 1748   Resp 17 06/09/22 1748   SpO2 98 % 06/09/22 1808   Vitals shown include unvalidated device data.    Electronically Signed By: Vernon German CRNA, APRN CRNA  June 9, 2022  8:09 PM

## 2022-06-10 NOTE — PROVIDER NOTIFICATION
MD Notification    Notified Person Name: Dr. Bains     Notification Date/Time: 6/9/22 at 2154    Notification Interaction: Spoke to MD over telephone     Purpose of Notification: Lactic 2.3    Orders Received: Verbal orders for Normal saline at 100/hr and recheck lactic in AM.     Comments:

## 2022-06-10 NOTE — PLAN OF CARE
[facility] DISCHARGE NOTE    Patient discharged to home at 11:20 AM via wheel chair. Accompanied by spouse and staff. Discharge instructions reviewed with patient, opportunity offered to ask questions. Prescriptions sent to patients preferred pharmacy. All belongings sent with patient.    Inga Bryson RNGoal Outcome Evaluation:

## 2022-06-13 LAB
PATH REPORT.COMMENTS IMP SPEC: NORMAL
PATH REPORT.COMMENTS IMP SPEC: NORMAL
PATH REPORT.FINAL DX SPEC: NORMAL
PATH REPORT.GROSS SPEC: NORMAL
PATH REPORT.MICROSCOPIC SPEC OTHER STN: NORMAL
PATH REPORT.RELEVANT HX SPEC: NORMAL
PHOTO IMAGE: NORMAL

## 2022-06-13 PROCEDURE — 88304 TISSUE EXAM BY PATHOLOGIST: CPT | Mod: 26 | Performed by: PATHOLOGY

## 2022-06-22 ENCOUNTER — OFFICE VISIT (OUTPATIENT)
Dept: SURGERY | Facility: CLINIC | Age: 72
End: 2022-06-22
Payer: MEDICARE

## 2022-06-22 VITALS — DIASTOLIC BLOOD PRESSURE: 96 MMHG | OXYGEN SATURATION: 99 % | SYSTOLIC BLOOD PRESSURE: 146 MMHG | HEART RATE: 58 BPM

## 2022-06-22 DIAGNOSIS — Z90.49 S/P LAPAROSCOPIC CHOLECYSTECTOMY: Primary | ICD-10-CM

## 2022-06-22 DIAGNOSIS — K81.2 ACUTE CHOLECYSTITIS WITH CHRONIC CHOLECYSTITIS: ICD-10-CM

## 2022-06-22 PROCEDURE — 99024 POSTOP FOLLOW-UP VISIT: CPT | Performed by: SURGERY

## 2022-06-22 NOTE — NURSING NOTE
"Initial BP (!) 146/96   Pulse 58   SpO2 99%  Estimated body mass index is 26.4 kg/m  as calculated from the following:    Height as of 6/9/22: 1.676 m (5' 6\").    Weight as of 6/9/22: 74.2 kg (163 lb 9.3 oz). .    Mery CARPENTER RN BSN PHN  Specialty Clinics    "

## 2022-06-22 NOTE — LETTER
"    6/22/2022         RE: Willian Reid  [address] [facility] [place] [id number]        Dear Colleague,    Thank you for referring your patient, Willian Reid, to the [facility]. Please see a copy of my visit note below.      Assessment & Plan   Problem List Items Addressed This Visit        Digestive    Acute cholecystitis with chronic cholecystitis      Other Visit Diagnoses     S/P laparoscopic cholecystectomy    -  Primary         Doing well  Minimal pain  Incisions are CDI  discussed pathology  Discussed restrictions of no lifting more than 20lbs for 4 weeks from DOS  All questions were answered.        BMI:   Estimated body mass index is 26.4 kg/m  as calculated from the following:    Height as of 6/9/22: 1.676 m (5' 6\").    Weight as of 6/9/22: 74.2 kg (163 lb 9.3 oz).       No follow-ups on file.    Joya Bains MD  [facility]    Trent Guerrero is a 72 year old accompanied by his spouse., presenting for the following health issues:  Surgical Followup      lap yudy 6/9/2022 - acute/chronic cholecystitis  No issues  Pain controlled;  Preop pain and feels much better than before surgery  Eating well  Normal BM  No issues urinating  No F/C/N/V         Review of Systems   Constitutional, HEENT, cardiovascular, pulmonary, GI, , musculoskeletal, neuro, skin, endocrine and psych systems are negative, except as otherwise noted.      Objective    BP (!) 146/96   Pulse 58   SpO2 99%   There is no height or weight on file to calculate BMI.  Physical Exam   Abdomen: incisions CDI.  No ecchymosis.     Final Diagnosis   Gallbladder: Laparoscopic cholecystectomy:  - Acute and chronic cholecystitis with cholelithiasis                   .  ..      Again, thank you for allowing me to participate in the care of your patient.        Sincerely,        Joya Bains MD    "

## 2022-06-22 NOTE — PROGRESS NOTES
"  Assessment & Plan   Problem List Items Addressed This Visit        Digestive    Acute cholecystitis with chronic cholecystitis      Other Visit Diagnoses     S/P laparoscopic cholecystectomy    -  Primary         Doing well  Minimal pain  Incisions are CDI  discussed pathology  Discussed restrictions of no lifting more than 20lbs for 4 weeks from DOS  All questions were answered.        BMI:   Estimated body mass index is 26.4 kg/m  as calculated from the following:    Height as of 6/9/22: 1.676 m (5' 6\").    Weight as of 6/9/22: 74.2 kg (163 lb 9.3 oz).       No follow-ups on file.    [facility] B[facility] MD  [facility] PERCY Guerrero is a 72 year old accompanied by his spouse., presenting for the following health issues:  Surgical Followup      lap yudy 6/9/2022 - acute/chronic cholecystitis  No issues  Pain controlled;  Preop pain and feels much better than before surgery  Eating well  Normal BM  No issues urinating  No F/C/N/V         Review of Systems   Constitutional, HEENT, cardiovascular, pulmonary, GI, , musculoskeletal, neuro, skin, endocrine and psych systems are negative, except as otherwise noted.      Objective    BP (!) 146/96   Pulse 58   SpO2 99%   There is no height or weight on file to calculate BMI.  Physical Exam   Abdomen: incisions CDI.  No ecchymosis.     Final Diagnosis   Gallbladder: Laparoscopic cholecystectomy:  - Acute and chronic cholecystitis with cholelithiasis                   .  ..  "

## 2022-07-10 ENCOUNTER — APPOINTMENT (OUTPATIENT)
Dept: CT IMAGING | Facility: CLINIC | Age: 72
DRG: 438 | End: 2022-07-10
Attending: STUDENT IN AN ORGANIZED HEALTH CARE EDUCATION/TRAINING PROGRAM
Payer: MEDICARE

## 2022-07-10 ENCOUNTER — HOSPITAL ENCOUNTER (INPATIENT)
Facility: CLINIC | Age: 72
LOS: 2 days | Discharge: HOME | DRG: 438 | End: 2022-07-13
Attending: STUDENT IN AN ORGANIZED HEALTH CARE EDUCATION/TRAINING PROGRAM
Payer: MEDICARE

## 2022-07-10 DIAGNOSIS — R74.8 ELEVATED LIVER ENZYMES: ICD-10-CM

## 2022-07-10 DIAGNOSIS — K91.89 POSTCHOLECYSTECTOMY DIARRHEA: ICD-10-CM

## 2022-07-10 DIAGNOSIS — Z11.52 ENCOUNTER FOR SCREENING LABORATORY TESTING FOR SEVERE ACUTE RESPIRATORY SYNDROME CORONAVIRUS 2 (SARS-COV-2): ICD-10-CM

## 2022-07-10 DIAGNOSIS — R74.8 ACID PHOSPHATASE ELEVATED: ICD-10-CM

## 2022-07-10 DIAGNOSIS — R19.7 POSTCHOLECYSTECTOMY DIARRHEA: ICD-10-CM

## 2022-07-10 DIAGNOSIS — Z90.49 HISTORY OF CHOLECYSTECTOMY: ICD-10-CM

## 2022-07-10 DIAGNOSIS — K85.90 ACUTE PANCREATITIS, UNSPECIFIED COMPLICATION STATUS, UNSPECIFIED PANCREATITIS TYPE: ICD-10-CM

## 2022-07-10 LAB
ALBUMIN SERPL-MCNC: 3.3 G/DL (ref 3.4–5)
ALP SERPL-CCNC: 294 U/L (ref 40–150)
ALT SERPL W P-5'-P-CCNC: 426 U/L (ref 0–70)
ANION GAP SERPL CALCULATED.3IONS-SCNC: 3 MMOL/L (ref 3–14)
AST SERPL W P-5'-P-CCNC: 246 U/L (ref 0–45)
BASE EXCESS BLDV CALC-SCNC: 0.6 MMOL/L (ref -7.7–1.9)
BASOPHILS # BLD AUTO: 0.1 10E3/UL (ref 0–0.2)
BASOPHILS NFR BLD AUTO: 0 %
BILIRUB SERPL-MCNC: 4.3 MG/DL (ref 0.2–1.3)
BUN SERPL-MCNC: 16 MG/DL (ref 7–30)
CALCIUM SERPL-MCNC: 9 MG/DL (ref 8.5–10.1)
CHLORIDE BLD-SCNC: 110 MMOL/L (ref 94–109)
CO2 SERPL-SCNC: 27 MMOL/L (ref 20–32)
CREAT SERPL-MCNC: 1.08 MG/DL (ref 0.66–1.25)
EOSINOPHIL # BLD AUTO: 0.1 10E3/UL (ref 0–0.7)
EOSINOPHIL NFR BLD AUTO: 1 %
ERYTHROCYTE [DISTWIDTH] IN BLOOD BY AUTOMATED COUNT: 13.6 % (ref 10–15)
GFR SERPL CREATININE-BSD FRML MDRD: 72 ML/MIN/1.73M2
GLUCOSE BLD-MCNC: 100 MG/DL (ref 70–99)
HCO3 BLDV-SCNC: 26 MMOL/L (ref 21–28)
HCT VFR BLD AUTO: 41.1 % (ref 40–53)
HGB BLD-MCNC: 13.4 G/DL (ref 13.3–17.7)
HOLD SPECIMEN: NORMAL
IMM GRANULOCYTES # BLD: 0.1 10E3/UL
IMM GRANULOCYTES NFR BLD: 0 %
LIPASE SERPL-CCNC: ABNORMAL U/L (ref 73–393)
LYMPHOCYTES # BLD AUTO: 1.4 10E3/UL (ref 0.8–5.3)
LYMPHOCYTES NFR BLD AUTO: 6 %
MCH RBC QN AUTO: 30.9 PG (ref 26.5–33)
MCHC RBC AUTO-ENTMCNC: 32.6 G/DL (ref 31.5–36.5)
MCV RBC AUTO: 95 FL (ref 78–100)
MONOCYTES # BLD AUTO: 1.7 10E3/UL (ref 0–1.3)
MONOCYTES NFR BLD AUTO: 8 %
NEUTROPHILS # BLD AUTO: 18 10E3/UL (ref 1.6–8.3)
NEUTROPHILS NFR BLD AUTO: 85 %
NRBC # BLD AUTO: 0 10E3/UL
NRBC BLD AUTO-RTO: 0 /100
O2/TOTAL GAS SETTING VFR VENT: 0 %
PCO2 BLDV: 46 MM HG (ref 40–50)
PH BLDV: 7.37 [PH] (ref 7.32–7.43)
PLATELET # BLD AUTO: 251 10E3/UL (ref 150–450)
PO2 BLDV: 22 MM HG (ref 25–47)
POTASSIUM BLD-SCNC: 5 MMOL/L (ref 3.4–5.3)
PROT SERPL-MCNC: 8 G/DL (ref 6.8–8.8)
RBC # BLD AUTO: 4.33 10E6/UL (ref 4.4–5.9)
SARS-COV-2 RNA RESP QL NAA+PROBE: NEGATIVE
SODIUM SERPL-SCNC: 140 MMOL/L (ref 133–144)
TROPONIN I SERPL HS-MCNC: <3 NG/L
WBC # BLD AUTO: 21.3 10E3/UL (ref 4–11)

## 2022-07-10 PROCEDURE — 87635 SARS-COV-2 COVID-19 AMP PRB: CPT | Performed by: STUDENT IN AN ORGANIZED HEALTH CARE EDUCATION/TRAINING PROGRAM

## 2022-07-10 PROCEDURE — 82803 BLOOD GASES ANY COMBINATION: CPT | Performed by: STUDENT IN AN ORGANIZED HEALTH CARE EDUCATION/TRAINING PROGRAM

## 2022-07-10 PROCEDURE — 250N000009 HC RX 250: Performed by: STUDENT IN AN ORGANIZED HEALTH CARE EDUCATION/TRAINING PROGRAM

## 2022-07-10 PROCEDURE — 96376 TX/PRO/DX INJ SAME DRUG ADON: CPT | Performed by: STUDENT IN AN ORGANIZED HEALTH CARE EDUCATION/TRAINING PROGRAM

## 2022-07-10 PROCEDURE — 96375 TX/PRO/DX INJ NEW DRUG ADDON: CPT | Performed by: STUDENT IN AN ORGANIZED HEALTH CARE EDUCATION/TRAINING PROGRAM

## 2022-07-10 PROCEDURE — 74177 CT ABD & PELVIS W/CONTRAST: CPT | Mod: MG

## 2022-07-10 PROCEDURE — 258N000003 HC RX IP 258 OP 636: Performed by: STUDENT IN AN ORGANIZED HEALTH CARE EDUCATION/TRAINING PROGRAM

## 2022-07-10 PROCEDURE — 96361 HYDRATE IV INFUSION ADD-ON: CPT | Performed by: STUDENT IN AN ORGANIZED HEALTH CARE EDUCATION/TRAINING PROGRAM

## 2022-07-10 PROCEDURE — 250N000011 HC RX IP 250 OP 636: Performed by: STUDENT IN AN ORGANIZED HEALTH CARE EDUCATION/TRAINING PROGRAM

## 2022-07-10 PROCEDURE — 80053 COMPREHEN METABOLIC PANEL: CPT | Performed by: STUDENT IN AN ORGANIZED HEALTH CARE EDUCATION/TRAINING PROGRAM

## 2022-07-10 PROCEDURE — 99285 EMERGENCY DEPT VISIT HI MDM: CPT | Mod: 25 | Performed by: STUDENT IN AN ORGANIZED HEALTH CARE EDUCATION/TRAINING PROGRAM

## 2022-07-10 PROCEDURE — 93005 ELECTROCARDIOGRAM TRACING: CPT | Performed by: STUDENT IN AN ORGANIZED HEALTH CARE EDUCATION/TRAINING PROGRAM

## 2022-07-10 PROCEDURE — 85025 COMPLETE CBC W/AUTO DIFF WBC: CPT | Performed by: STUDENT IN AN ORGANIZED HEALTH CARE EDUCATION/TRAINING PROGRAM

## 2022-07-10 PROCEDURE — 36415 COLL VENOUS BLD VENIPUNCTURE: CPT | Performed by: STUDENT IN AN ORGANIZED HEALTH CARE EDUCATION/TRAINING PROGRAM

## 2022-07-10 PROCEDURE — C9803 HOPD COVID-19 SPEC COLLECT: HCPCS | Performed by: STUDENT IN AN ORGANIZED HEALTH CARE EDUCATION/TRAINING PROGRAM

## 2022-07-10 PROCEDURE — 93010 ELECTROCARDIOGRAM REPORT: CPT | Performed by: STUDENT IN AN ORGANIZED HEALTH CARE EDUCATION/TRAINING PROGRAM

## 2022-07-10 PROCEDURE — 84484 ASSAY OF TROPONIN QUANT: CPT | Performed by: STUDENT IN AN ORGANIZED HEALTH CARE EDUCATION/TRAINING PROGRAM

## 2022-07-10 PROCEDURE — 83690 ASSAY OF LIPASE: CPT | Performed by: STUDENT IN AN ORGANIZED HEALTH CARE EDUCATION/TRAINING PROGRAM

## 2022-07-10 RX ORDER — IOPAMIDOL 755 MG/ML
70 INJECTION, SOLUTION INTRAVASCULAR ONCE
Status: COMPLETED | OUTPATIENT
Start: 2022-07-10 | End: 2022-07-10

## 2022-07-10 RX ORDER — ONDANSETRON 2 MG/ML
4 INJECTION INTRAMUSCULAR; INTRAVENOUS ONCE
Status: COMPLETED | OUTPATIENT
Start: 2022-07-10 | End: 2022-07-10

## 2022-07-10 RX ORDER — HYDROMORPHONE HYDROCHLORIDE 1 MG/ML
0.3 INJECTION, SOLUTION INTRAMUSCULAR; INTRAVENOUS; SUBCUTANEOUS
Status: COMPLETED | OUTPATIENT
Start: 2022-07-10 | End: 2022-07-11

## 2022-07-10 RX ORDER — HYDROMORPHONE HYDROCHLORIDE 1 MG/ML
0.3 INJECTION, SOLUTION INTRAMUSCULAR; INTRAVENOUS; SUBCUTANEOUS ONCE
Status: COMPLETED | OUTPATIENT
Start: 2022-07-10 | End: 2022-07-10

## 2022-07-10 RX ORDER — SODIUM CHLORIDE, SODIUM LACTATE, POTASSIUM CHLORIDE, CALCIUM CHLORIDE 600; 310; 30; 20 MG/100ML; MG/100ML; MG/100ML; MG/100ML
INJECTION, SOLUTION INTRAVENOUS CONTINUOUS
Status: DISCONTINUED | OUTPATIENT
Start: 2022-07-10 | End: 2022-07-13 | Stop reason: HOSPADM

## 2022-07-10 RX ADMIN — SODIUM CHLORIDE 500 ML: 9 INJECTION, SOLUTION INTRAVENOUS at 16:58

## 2022-07-10 RX ADMIN — SODIUM CHLORIDE 59 ML: 9 INJECTION, SOLUTION INTRAVENOUS at 16:17

## 2022-07-10 RX ADMIN — HYDROMORPHONE HYDROCHLORIDE 0.3 MG: 1 INJECTION, SOLUTION INTRAMUSCULAR; INTRAVENOUS; SUBCUTANEOUS at 17:19

## 2022-07-10 RX ADMIN — SODIUM CHLORIDE 500 ML: 9 INJECTION, SOLUTION INTRAVENOUS at 14:37

## 2022-07-10 RX ADMIN — HYDROMORPHONE HYDROCHLORIDE 0.3 MG: 1 INJECTION, SOLUTION INTRAMUSCULAR; INTRAVENOUS; SUBCUTANEOUS at 21:16

## 2022-07-10 RX ADMIN — HYDROMORPHONE HYDROCHLORIDE 0.3 MG: 1 INJECTION, SOLUTION INTRAMUSCULAR; INTRAVENOUS; SUBCUTANEOUS at 14:38

## 2022-07-10 RX ADMIN — IOPAMIDOL 70 ML: 755 INJECTION, SOLUTION INTRAVENOUS at 16:17

## 2022-07-10 RX ADMIN — ONDANSETRON 4 MG: 2 INJECTION INTRAMUSCULAR; INTRAVENOUS at 14:37

## 2022-07-10 RX ADMIN — SODIUM CHLORIDE, POTASSIUM CHLORIDE, SODIUM LACTATE AND CALCIUM CHLORIDE: 600; 310; 30; 20 INJECTION, SOLUTION INTRAVENOUS at 19:16

## 2022-07-10 NOTE — ED PROVIDER NOTES
History     Chief Complaint   Patient presents with     Abdominal Pain     Gall bladder surgery on 06/10. Was feeling good, but now started feeling the same pain and vomiting. Onset yesterday.      HPI  Willian Reid is a 72 year old male status post laparoscopic cholecystectomy performed 6/9/2022 presents department for evaluation of generalized abdominal discomfort with bloating, nausea, and vomiting.  Patient explains that he had been feeling well the weeks after surgery, however on Thursday evening he suffered from severe bout of central back pain and nausea similar to symptoms related to gallbladder prior to surgery.  However symptoms improved and he has only had mild malaise and nausea for the past 2 days, this afternoon he again developed nausea with 4-5 bouts of nonbloody emesis.  He describes generalized abdominal achiness and bloating, has not had a bowel movement in 2 days and unable to pass flatus.  He denies fever, chills, chest pain, cough, shortness of breath, back pain or genitourinary symptoms.  No recent symptomatic medications.      Allergies:  Allergies   Allergen Reactions     Tetracycline Rash       Problem List:    Patient Active Problem List    Diagnosis Date Noted     Abdominal pain, epigastric 06/09/2022     Priority: Medium     Abdominal bloating 06/09/2022     Priority: Medium     Acute cholecystitis with chronic cholecystitis 06/09/2022     Priority: Medium     Gastroesophageal reflux disease, unspecified whether esophagitis present 06/09/2022     Priority: Medium     Bronchitis with bronchospasm 11/10/2021     Priority: Medium     Seasonal allergic rhinitis, unspecified trigger 11/10/2021     Priority: Medium     Cough and wheeze in summer thought due to allergies; occ use of albuterol inhaler       Situational anxiety 04/16/2018     Priority: Medium     Prediabetes 10/07/2011     Priority: Medium     Glucose 109, October, 2011.        Hypertriglyceridemia 03/31/2011     Priority:  Medium     240 in Sept, 2010. Dietary therapy and check the lab annually.        CARDIOVASCULAR SCREENING; LDL GOAL LESS THAN 130 10/31/2010     Priority: Medium     Essential hypertension 03/02/2009     Priority: Medium     Plantar fasciitis 07/30/2008     Priority: Medium     Other tenosynovitis of hand and wrist 04/20/2007     Priority: Medium     Degeneration of lumbar or lumbosacral intervertebral disc 04/20/2007     Priority: Medium     Esophageal reflux 04/20/2007     Priority: Medium        Past Medical History:    Past Medical History:   Diagnosis Date     Diverticulitis of colon (without mention of hemorrhage)(562.11) 01/01/1980     Hypertension      Uncomplicated asthma        Past Surgical History:    Past Surgical History:   Procedure Laterality Date     FLEXIBLE SIGMOIDOSCOPY  3/2/2005     Flexible sigmoidoscopy probably should be changed to a colonoscopy at his next visit to get further and to help him with sedation, so in 2010 he should have a colonoscopy and then every 10 years afterwards     LAPAROSCOPIC CHOLECYSTECTOMY N/A 6/9/2022    Procedure: CHOLECYSTECTOMY, LAPAROSCOPIC;  Surgeon: Joya Bains MD;  Location: [facility] OR       Family History:    Family History   Problem Relation Age of Onset     Osteoporosis Mother      Cancer Mother         facial skin cancers removed     Arthritis Mother         OA     Heart Disease Father      Diabetes Father      Eye Disorder Father      C.A.D. Father      Cancer Maternal Grandmother      Cancer Paternal Grandmother      Heart Disease Paternal Grandfather      Hypertension Maternal Uncle      Cerebrovascular Disease Maternal Uncle      Colon Cancer Cousin 76     Asthma No family hx of      Cancer - colorectal No family hx of      Prostate Cancer No family hx of      Breast Cancer No family hx of        Social History:  Marital Status:   [2]  Social History     Tobacco Use     Smoking status: Never Smoker     Smokeless tobacco: Never Used   Vaping Use  "    Vaping Use: Never used   Substance Use Topics     Alcohol use: No     Drug use: No        Medications:    albuterol (PROAIR HFA/PROVENTIL HFA/VENTOLIN HFA) 108 (90 Base) MCG/ACT inhaler  atenolol (TENORMIN) 100 MG tablet  famotidine (PEPCID) 40 MG tablet  losartan (COZAAR) 50 MG tablet  MULTIVITAMINS OR TABS  Probiotic Product (PRO-BIOTIC BLEND) CAPS  omeprazole (PRILOSEC) 20 MG DR capsule          Review of Systems  Constitutional:  Negative for fever or chills.  Cardiovascular:  Negative for chest discomfort.  Respiratory:  Negative for cough or shortness of breath.   Gastrointestinal: Positive for nausea and vomiting with generalized abdominal discomfort.  Negative for diarrhea.  Genitourinary:  Negative for dysuria or urgency.  Musculoskeletal: Negative for back or flank pain.  Neurological:  Negative for dizziness.    All others reviewed and are negative.      Physical Exam   BP: 127/62  Pulse: 67  Temp: 98.7  F (37.1  C)  Resp: 20  Height: 167.6 cm (5' 6\")  Weight: 72.1 kg (159 lb)  SpO2: 96 %      Physical Exam  Constitutional:  Well developed, well nourished.  Appears uncomfortable but nontoxic.  HENT:  Normocephalic and atraumatic.  Symmetric in appearance.  Eyes:  Conjunctivae are normal.  Neck:  Neck supple.  Cardiovascular:  No cyanosis.  RRR.  No audible murmurs noted.    Respiratory:  Effort normal without sign of respiratory distress.  No audible wheezing or stridor.  CTAB.   Gastrointestinal:  Soft nondistended abdomen.  Epigastric tenderness with guarding.  No rigidity or rebound tenderness.  Negative Villa's sign.  Negative McBurney's point.  No palpable pulsatile mass.   Musculoskeletal:  Moves extremities spontaneously.  Neurological:  Patient is alert.  Skin:  Skin is warm and dry.  Psychiatric:  Normal mood and affect.      ED Course                 Procedures                EKG Interpretation:      Interpreted by: Ismael Ramon  Time reviewed: Upon completion    Symptoms at time of EKG: " Nausea  Rhythm: Sinus  Rate: 80 bpm  Axis: Normal    Conduction: None atypical   ST Segments/ T Waves: No pathologic ST-elevations or T-wave abnormalities.  Q Waves: None  Comparison to prior: Similar morphology to previous     Clinical Impression: No sign of ischemia         Critical Care time:               Results for orders placed or performed during the hospital encounter of 07/10/22 (from the past 24 hour(s))   Maurice Draw    Narrative    The following orders were created for panel order Maurice Draw.  Procedure                               Abnormality         Status                     ---------                               -----------         ------                     Extra Blue Top Tube[[id number]]                              Final result               Extra Red Top Tube[[id number]]                               Final result               Extra Green Top (Lithium...[[id number]]                      Final result               Extra Purple Top Tube[[id number]]                            Final result               Extra Green Top (Lithium...[[id number]]                      Final result                 Please view results for these tests on the individual orders.   Extra Blue Top Tube   Result Value Ref Range    Hold Specimen JIC    Extra Red Top Tube   Result Value Ref Range    Hold Specimen JIC    Extra Green Top (Lithium Heparin) Tube   Result Value Ref Range    Hold Specimen JIC    Extra Purple Top Tube   Result Value Ref Range    Hold Specimen JIC    Extra Green Top (Lithium Heparin) ON ICE   Result Value Ref Range    Hold Specimen JIC    CBC with platelets differential    Narrative    The following orders were created for panel order CBC with platelets differential.  Procedure                               Abnormality         Status                     ---------                               -----------         ------                     CBC with platelets and d...[[id number]]  Abnormal            Final  result                 Please view results for these tests on the individual orders.   Troponin I   Result Value Ref Range    Troponin I High Sensitivity <3 <79 ng/L   CBC with platelets and differential   Result Value Ref Range    WBC Count 21.3 (H) 4.0 - 11.0 10e3/uL    RBC Count 4.33 (L) 4.40 - 5.90 10e6/uL    Hemoglobin 13.4 13.3 - 17.7 g/dL    Hematocrit 41.1 40.0 - 53.0 %    MCV 95 78 - 100 fL    MCH 30.9 26.5 - 33.0 pg    MCHC 32.6 31.5 - 36.5 g/dL    RDW 13.6 10.0 - 15.0 %    Platelet Count 251 150 - 450 10e3/uL    % Neutrophils 85 %    % Lymphocytes 6 %    % Monocytes 8 %    % Eosinophils 1 %    % Basophils 0 %    % Immature Granulocytes 0 %    NRBCs per 100 WBC 0 <1 /100    Absolute Neutrophils 18.0 (H) 1.6 - 8.3 10e3/uL    Absolute Lymphocytes 1.4 0.8 - 5.3 10e3/uL    Absolute Monocytes 1.7 (H) 0.0 - 1.3 10e3/uL    Absolute Eosinophils 0.1 0.0 - 0.7 10e3/uL    Absolute Basophils 0.1 0.0 - 0.2 10e3/uL    Absolute Immature Granulocytes 0.1 <=0.4 10e3/uL    Absolute NRBCs 0.0 10e3/uL   Comprehensive metabolic panel   Result Value Ref Range    Sodium 140 133 - 144 mmol/L    Potassium 5.0 3.4 - 5.3 mmol/L    Chloride 110 (H) 94 - 109 mmol/L    Carbon Dioxide (CO2) 27 20 - 32 mmol/L    Anion Gap 3 3 - 14 mmol/L    Urea Nitrogen 16 7 - 30 mg/dL    Creatinine 1.08 0.66 - 1.25 mg/dL    Calcium 9.0 8.5 - 10.1 mg/dL    Glucose 100 (H) 70 - 99 mg/dL    Alkaline Phosphatase 294 (H) 40 - 150 U/L     (H) 0 - 45 U/L     (H) 0 - 70 U/L    Protein Total 8.0 6.8 - 8.8 g/dL    Albumin 3.3 (L) 3.4 - 5.0 g/dL    Bilirubin Total 4.3 (H) 0.2 - 1.3 mg/dL    GFR Estimate 72 >60 mL/min/1.73m2   Lipase   Result Value Ref Range    Lipase 29,919 (H) 73 - 393 U/L   CT Abdomen Pelvis w Contrast    Narrative    EXAM: CT ABDOMEN PELVIS W CONTRAST  LOCATION: [facility]  DATE/TIME: 7/10/2022 4:18 PM    INDICATION: Generalized abdominal pain with nausea vomiting, 4 weeks s p  cholecystectomy  COMPARISON: 6/9/2022  TECHNIQUE: CT scan of the abdomen and pelvis was performed following injection of IV contrast. Multiplanar reformats were obtained. Dose reduction techniques were used.  CONTRAST: 70mL Isovue 370    FINDINGS:   LOWER CHEST: Calcified granulomas right lower lobe. Strands of scarring or atelectasis in the right lung base. Small sliding-type esophageal hiatal hernia.    HEPATOBILIARY: Cholecystectomy.    PANCREAS: Moderate inflammation and free fluid surrounding the pancreatic tail in the lesser sac. No focal fluid collection. Normal pancreatic enhancement with no necrosis.    SPLEEN: Calcified granuloma. Small accessory splenule.    ADRENAL GLANDS: Normal.    KIDNEYS/BLADDER: Bilateral kidney cysts, requiring no follow-up.    BOWEL: Duodenal diverticulum. Partial colectomy with sigmoid colocolostomy. Scattered colonic diverticula. No diverticulitis. Normal appendix.    LYMPH NODES: Normal.    VASCULATURE: Minimal atherosclerosis. Patent splenic vein. No arterial pseudoaneurysm.    PELVIC ORGANS: Moderately enlarged prostate. Pelvic phleboliths.    MUSCULOSKELETAL: Mild degenerative change in the spine.      Impression    IMPRESSION:   Uncomplicated acute pancreatitis   Blood gas venous   Result Value Ref Range    pH Venous 7.37 7.32 - 7.43    pCO2 Venous 46 40 - 50 mm Hg    pO2 Venous 22 (L) 25 - 47 mm Hg    Bicarbonate Venous 26 21 - 28 mmol/L    Base Excess/Deficit (+/-) 0.6 -7.7 - 1.9 mmol/L    FIO2 0    Asymptomatic COVID-19 Virus (Coronavirus) by PCR Nose    Specimen: Nose; Swab   Result Value Ref Range    SARS CoV2 PCR Negative Negative    Narrative    Testing was performed using the dhruv  SARS-CoV-2 & Influenza A/B Assay on the dhruv  [place]  System.  This test should be ordered for the detection of SARS-COV-2 in individuals who meet SARS-CoV-2 clinical and/or epidemiological criteria. Test performance is unknown in asymptomatic patients.  This test is for in vitro  diagnostic use under the FDA EUA for laboratories certified under CLIA to perform moderate and/or high complexity testing. This test has not been FDA cleared or approved.  A negative test does not rule out the presence of PCR inhibitors in the specimen or target RNA in concentration below the limit of detection for the assay. The possibility of a false negative should be considered if the patient's recent exposure or clinical presentation suggests COVID-19.  [facility] are certified under the Clinical Laboratory Improvement Amendments of 1988 (CLIA-88) as qualified to perform moderate and/or high complexity laboratory testing.       Medications   HYDROmorphone (PF) (DILAUDID) injection 0.3 mg (0.3 mg Intravenous Given 7/10/22 2116)   lactated ringers infusion ( Intravenous Rate/Dose Verify 7/10/22 2116)   HYDROmorphone (PF) (DILAUDID) injection 0.3 mg (0.3 mg Intravenous Given 7/10/22 1438)   ondansetron (ZOFRAN) injection 4 mg (4 mg Intravenous Given 7/10/22 1437)   0.9% sodium chloride BOLUS (0 mLs Intravenous Stopped 7/10/22 1931)   iopamidol (ISOVUE-370) solution 70 mL (70 mLs Intravenous Given 7/10/22 1617)   sodium chloride 0.9 % bag 500mL for CT scan flush use (59 mLs As instructed Given 7/10/22 1617)   0.9% sodium chloride BOLUS (0 mLs Intravenous Stopped 7/10/22 1929)       Assessments & Plan (with Medical Decision Making)   Willian Reid is a 72 year old male who presents to the department for evaluation progressive epigastric abdominal pain with nausea and vomiting.  Patient is 4 weeks status post laparoscopic cholecystectomy, interestingly the prior CT scan question CBD stone but the subsequent MRCP was without occlusion.  Today patient's lipase value is nearly 30,000 and associated with mildly elevated alkaline phosphatase, hepatic enzymes and bilirubin.  Consulted on-call general surgeon Dr. Deras who suggested consulting GI for possible ERCP.  Subsequently spoke with [facility]  gastroenterologist Dr. Lynch who does recommend transfer for ERCP but unfortunately [facility]  Attempted to transfer outside of [facility] but no available beds within the [facility] area to accommodate transfer at this time.  At end of shift patient care transferred to overnight physician Dr. Desir for final disposition.        Disclaimer:  This note consists of symbols derived from keyboarding, dictation, and/or voice recognition software.  As a result, there may be errors in the script that have gone undetected.  Please consider this when interpreting information found in the chart.        I have reviewed the nursing notes.    I have reviewed the findings, diagnosis, plan and need for follow up with the patient.      New Prescriptions    No medications on file       Final diagnoses:   Acute pancreatitis, unspecified complication status, unspecified pancreatitis type   Elevated liver enzymes   History of cholecystectomy       7/10/2022   [facility] EMERGENCY DEPT     Ismael Ramon DO  07/10/22 3000

## 2022-07-11 ENCOUNTER — APPOINTMENT (OUTPATIENT)
Dept: MRI IMAGING | Facility: CLINIC | Age: 72
DRG: 438 | End: 2022-07-11
Attending: EMERGENCY MEDICINE
Payer: MEDICARE

## 2022-07-11 PROBLEM — J20.9 BRONCHITIS WITH BRONCHOSPASM: Status: ACTIVE | Noted: 2021-11-10

## 2022-07-11 PROBLEM — Z90.49 HISTORY OF CHOLECYSTECTOMY: Status: ACTIVE | Noted: 2022-07-11

## 2022-07-11 PROBLEM — K85.90 ACUTE PANCREATITIS, UNSPECIFIED COMPLICATION STATUS, UNSPECIFIED PANCREATITIS TYPE: Status: ACTIVE | Noted: 2022-07-11

## 2022-07-11 PROBLEM — R74.8 ELEVATED LIVER ENZYMES: Status: ACTIVE | Noted: 2022-07-11

## 2022-07-11 LAB
ALBUMIN SERPL-MCNC: 2.9 G/DL (ref 3.4–5)
ALP SERPL-CCNC: 255 U/L (ref 40–150)
ALT SERPL W P-5'-P-CCNC: 300 U/L (ref 0–70)
ANION GAP SERPL CALCULATED.3IONS-SCNC: 5 MMOL/L (ref 3–14)
AST SERPL W P-5'-P-CCNC: 124 U/L (ref 0–45)
BASOPHILS # BLD AUTO: 0 10E3/UL (ref 0–0.2)
BASOPHILS NFR BLD AUTO: 0 %
BILIRUB SERPL-MCNC: 1.6 MG/DL (ref 0.2–1.3)
BUN SERPL-MCNC: 16 MG/DL (ref 7–30)
CALCIUM SERPL-MCNC: 8.7 MG/DL (ref 8.5–10.1)
CHLORIDE BLD-SCNC: 110 MMOL/L (ref 94–109)
CO2 SERPL-SCNC: 26 MMOL/L (ref 20–32)
CREAT SERPL-MCNC: 0.94 MG/DL (ref 0.66–1.25)
EOSINOPHIL # BLD AUTO: 0.1 10E3/UL (ref 0–0.7)
EOSINOPHIL NFR BLD AUTO: 1 %
ERYTHROCYTE [DISTWIDTH] IN BLOOD BY AUTOMATED COUNT: 14.1 % (ref 10–15)
GFR SERPL CREATININE-BSD FRML MDRD: 85 ML/MIN/1.73M2
GLUCOSE BLD-MCNC: 82 MG/DL (ref 70–99)
HCT VFR BLD AUTO: 37.1 % (ref 40–53)
HGB BLD-MCNC: 11.8 G/DL (ref 13.3–17.7)
IMM GRANULOCYTES # BLD: 0 10E3/UL
IMM GRANULOCYTES NFR BLD: 0 %
LACTATE SERPL-SCNC: 1.3 MMOL/L (ref 0.7–2)
LACTATE SERPL-SCNC: 2.5 MMOL/L (ref 0.7–2)
LIPASE SERPL-CCNC: 4534 U/L (ref 73–393)
LYMPHOCYTES # BLD AUTO: 1.9 10E3/UL (ref 0.8–5.3)
LYMPHOCYTES NFR BLD AUTO: 16 %
MCH RBC QN AUTO: 31.1 PG (ref 26.5–33)
MCHC RBC AUTO-ENTMCNC: 31.8 G/DL (ref 31.5–36.5)
MCV RBC AUTO: 98 FL (ref 78–100)
MONOCYTES # BLD AUTO: 1.1 10E3/UL (ref 0–1.3)
MONOCYTES NFR BLD AUTO: 9 %
NEUTROPHILS # BLD AUTO: 9 10E3/UL (ref 1.6–8.3)
NEUTROPHILS NFR BLD AUTO: 74 %
NRBC # BLD AUTO: 0 10E3/UL
NRBC BLD AUTO-RTO: 0 /100
PLATELET # BLD AUTO: 171 10E3/UL (ref 150–450)
POTASSIUM BLD-SCNC: 4.1 MMOL/L (ref 3.4–5.3)
PROT SERPL-MCNC: 7.4 G/DL (ref 6.8–8.8)
RBC # BLD AUTO: 3.8 10E6/UL (ref 4.4–5.9)
SODIUM SERPL-SCNC: 141 MMOL/L (ref 133–144)
TRIGL SERPL-MCNC: 79 MG/DL
WBC # BLD AUTO: 12.2 10E3/UL (ref 4–11)

## 2022-07-11 PROCEDURE — 99223 1ST HOSP IP/OBS HIGH 75: CPT | Mod: AI | Performed by: PHYSICIAN ASSISTANT

## 2022-07-11 PROCEDURE — 120N000001 HC R&B MED SURG/OB

## 2022-07-11 PROCEDURE — 84478 ASSAY OF TRIGLYCERIDES: CPT | Performed by: EMERGENCY MEDICINE

## 2022-07-11 PROCEDURE — 250N000013 HC RX MED GY IP 250 OP 250 PS 637: Performed by: PHYSICIAN ASSISTANT

## 2022-07-11 PROCEDURE — 36415 COLL VENOUS BLD VENIPUNCTURE: CPT | Performed by: PHYSICIAN ASSISTANT

## 2022-07-11 PROCEDURE — 96366 THER/PROPH/DIAG IV INF ADDON: CPT | Performed by: STUDENT IN AN ORGANIZED HEALTH CARE EDUCATION/TRAINING PROGRAM

## 2022-07-11 PROCEDURE — 85025 COMPLETE CBC W/AUTO DIFF WBC: CPT | Performed by: EMERGENCY MEDICINE

## 2022-07-11 PROCEDURE — 83690 ASSAY OF LIPASE: CPT | Performed by: EMERGENCY MEDICINE

## 2022-07-11 PROCEDURE — 96365 THER/PROPH/DIAG IV INF INIT: CPT | Mod: 59 | Performed by: STUDENT IN AN ORGANIZED HEALTH CARE EDUCATION/TRAINING PROGRAM

## 2022-07-11 PROCEDURE — 96361 HYDRATE IV INFUSION ADD-ON: CPT | Performed by: STUDENT IN AN ORGANIZED HEALTH CARE EDUCATION/TRAINING PROGRAM

## 2022-07-11 PROCEDURE — 250N000011 HC RX IP 250 OP 636: Performed by: EMERGENCY MEDICINE

## 2022-07-11 PROCEDURE — 80053 COMPREHEN METABOLIC PANEL: CPT | Performed by: EMERGENCY MEDICINE

## 2022-07-11 PROCEDURE — 250N000011 HC RX IP 250 OP 636: Performed by: PHYSICIAN ASSISTANT

## 2022-07-11 PROCEDURE — 258N000003 HC RX IP 258 OP 636: Performed by: EMERGENCY MEDICINE

## 2022-07-11 PROCEDURE — 250N000011 HC RX IP 250 OP 636: Performed by: STUDENT IN AN ORGANIZED HEALTH CARE EDUCATION/TRAINING PROGRAM

## 2022-07-11 PROCEDURE — A9585 GADOBUTROL INJECTION: HCPCS | Performed by: EMERGENCY MEDICINE

## 2022-07-11 PROCEDURE — 258N000003 HC RX IP 258 OP 636: Performed by: STUDENT IN AN ORGANIZED HEALTH CARE EDUCATION/TRAINING PROGRAM

## 2022-07-11 PROCEDURE — 255N000002 HC RX 255 OP 636: Performed by: EMERGENCY MEDICINE

## 2022-07-11 PROCEDURE — 83605 ASSAY OF LACTIC ACID: CPT | Performed by: PHYSICIAN ASSISTANT

## 2022-07-11 PROCEDURE — 258N000003 HC RX IP 258 OP 636: Performed by: PHYSICIAN ASSISTANT

## 2022-07-11 PROCEDURE — 96376 TX/PRO/DX INJ SAME DRUG ADON: CPT | Performed by: STUDENT IN AN ORGANIZED HEALTH CARE EDUCATION/TRAINING PROGRAM

## 2022-07-11 PROCEDURE — 74183 MRI ABD W/O CNTR FLWD CNTR: CPT | Mod: MG

## 2022-07-11 PROCEDURE — 250N000013 HC RX MED GY IP 250 OP 250 PS 637: Performed by: EMERGENCY MEDICINE

## 2022-07-11 PROCEDURE — 36415 COLL VENOUS BLD VENIPUNCTURE: CPT | Performed by: EMERGENCY MEDICINE

## 2022-07-11 RX ORDER — ONDANSETRON 2 MG/ML
4 INJECTION INTRAMUSCULAR; INTRAVENOUS EVERY 6 HOURS PRN
Status: DISCONTINUED | OUTPATIENT
Start: 2022-07-11 | End: 2022-07-11

## 2022-07-11 RX ORDER — LOSARTAN POTASSIUM 50 MG/1
50 TABLET ORAL DAILY
Status: DISCONTINUED | OUTPATIENT
Start: 2022-07-11 | End: 2022-07-11

## 2022-07-11 RX ORDER — ONDANSETRON 4 MG/1
4 TABLET, ORALLY DISINTEGRATING ORAL EVERY 6 HOURS PRN
Status: DISCONTINUED | OUTPATIENT
Start: 2022-07-11 | End: 2022-07-11

## 2022-07-11 RX ORDER — ENOXAPARIN SODIUM 100 MG/ML
40 INJECTION SUBCUTANEOUS EVERY 24 HOURS
Status: DISCONTINUED | OUTPATIENT
Start: 2022-07-11 | End: 2022-07-13 | Stop reason: HOSPADM

## 2022-07-11 RX ORDER — NALOXONE HYDROCHLORIDE 0.4 MG/ML
0.2 INJECTION, SOLUTION INTRAMUSCULAR; INTRAVENOUS; SUBCUTANEOUS
Status: DISCONTINUED | OUTPATIENT
Start: 2022-07-11 | End: 2022-07-13 | Stop reason: HOSPADM

## 2022-07-11 RX ORDER — FAMOTIDINE 20 MG/1
40 TABLET, FILM COATED ORAL DAILY
Status: DISCONTINUED | OUTPATIENT
Start: 2022-07-11 | End: 2022-07-13 | Stop reason: HOSPADM

## 2022-07-11 RX ORDER — HYDROMORPHONE HYDROCHLORIDE 1 MG/ML
.3-.5 INJECTION, SOLUTION INTRAMUSCULAR; INTRAVENOUS; SUBCUTANEOUS
Status: DISCONTINUED | OUTPATIENT
Start: 2022-07-11 | End: 2022-07-13 | Stop reason: HOSPADM

## 2022-07-11 RX ORDER — ALBUTEROL SULFATE 0.83 MG/ML
2.5 SOLUTION RESPIRATORY (INHALATION) EVERY 6 HOURS PRN
Status: DISCONTINUED | OUTPATIENT
Start: 2022-07-11 | End: 2022-07-13 | Stop reason: HOSPADM

## 2022-07-11 RX ORDER — ONDANSETRON 4 MG/1
4 TABLET, ORALLY DISINTEGRATING ORAL EVERY 6 HOURS PRN
Status: DISCONTINUED | OUTPATIENT
Start: 2022-07-11 | End: 2022-07-13 | Stop reason: HOSPADM

## 2022-07-11 RX ORDER — NALOXONE HYDROCHLORIDE 0.4 MG/ML
0.4 INJECTION, SOLUTION INTRAMUSCULAR; INTRAVENOUS; SUBCUTANEOUS
Status: DISCONTINUED | OUTPATIENT
Start: 2022-07-11 | End: 2022-07-13 | Stop reason: HOSPADM

## 2022-07-11 RX ORDER — LOSARTAN POTASSIUM 50 MG/1
50 TABLET ORAL DAILY
Status: DISCONTINUED | OUTPATIENT
Start: 2022-07-12 | End: 2022-07-13 | Stop reason: HOSPADM

## 2022-07-11 RX ORDER — ATENOLOL 100 MG/1
100 TABLET ORAL DAILY
Status: DISCONTINUED | OUTPATIENT
Start: 2022-07-11 | End: 2022-07-11

## 2022-07-11 RX ORDER — PROCHLORPERAZINE MALEATE 5 MG
5 TABLET ORAL EVERY 6 HOURS PRN
Status: DISCONTINUED | OUTPATIENT
Start: 2022-07-11 | End: 2022-07-13 | Stop reason: HOSPADM

## 2022-07-11 RX ORDER — LIDOCAINE 40 MG/G
CREAM TOPICAL
Status: DISCONTINUED | OUTPATIENT
Start: 2022-07-11 | End: 2022-07-13 | Stop reason: HOSPADM

## 2022-07-11 RX ORDER — GADOBUTROL 604.72 MG/ML
7.5 INJECTION INTRAVENOUS ONCE
Status: COMPLETED | OUTPATIENT
Start: 2022-07-11 | End: 2022-07-11

## 2022-07-11 RX ORDER — ATENOLOL 100 MG/1
100 TABLET ORAL DAILY
Status: DISCONTINUED | OUTPATIENT
Start: 2022-07-12 | End: 2022-07-13 | Stop reason: HOSPADM

## 2022-07-11 RX ORDER — ONDANSETRON 2 MG/ML
4 INJECTION INTRAMUSCULAR; INTRAVENOUS EVERY 6 HOURS PRN
Status: DISCONTINUED | OUTPATIENT
Start: 2022-07-11 | End: 2022-07-13 | Stop reason: HOSPADM

## 2022-07-11 RX ORDER — PROCHLORPERAZINE 25 MG
12.5 SUPPOSITORY, RECTAL RECTAL EVERY 12 HOURS PRN
Status: DISCONTINUED | OUTPATIENT
Start: 2022-07-11 | End: 2022-07-13 | Stop reason: HOSPADM

## 2022-07-11 RX ADMIN — SODIUM CHLORIDE, POTASSIUM CHLORIDE, SODIUM LACTATE AND CALCIUM CHLORIDE: 600; 310; 30; 20 INJECTION, SOLUTION INTRAVENOUS at 04:27

## 2022-07-11 RX ADMIN — HYDROMORPHONE HYDROCHLORIDE 0.5 MG: 1 INJECTION, SOLUTION INTRAMUSCULAR; INTRAVENOUS; SUBCUTANEOUS at 12:59

## 2022-07-11 RX ADMIN — HYDROMORPHONE HYDROCHLORIDE 0.5 MG: 1 INJECTION, SOLUTION INTRAMUSCULAR; INTRAVENOUS; SUBCUTANEOUS at 16:21

## 2022-07-11 RX ADMIN — SODIUM CHLORIDE 50 ML: 9 INJECTION, SOLUTION INTRAVENOUS at 12:31

## 2022-07-11 RX ADMIN — GADOBUTROL 7.5 ML: 604.72 INJECTION INTRAVENOUS at 12:31

## 2022-07-11 RX ADMIN — TAZOBACTAM SODIUM AND PIPERACILLIN SODIUM 3.38 G: 375; 3 INJECTION, SOLUTION INTRAVENOUS at 10:37

## 2022-07-11 RX ADMIN — HYDROMORPHONE HYDROCHLORIDE 0.5 MG: 1 INJECTION, SOLUTION INTRAMUSCULAR; INTRAVENOUS; SUBCUTANEOUS at 08:55

## 2022-07-11 RX ADMIN — ATENOLOL 100 MG: 50 TABLET ORAL at 08:55

## 2022-07-11 RX ADMIN — HYDROMORPHONE HYDROCHLORIDE 0.5 MG: 1 INJECTION, SOLUTION INTRAMUSCULAR; INTRAVENOUS; SUBCUTANEOUS at 20:53

## 2022-07-11 RX ADMIN — ONDANSETRON 4 MG: 4 TABLET, ORALLY DISINTEGRATING ORAL at 20:17

## 2022-07-11 RX ADMIN — TAZOBACTAM SODIUM AND PIPERACILLIN SODIUM 3.38 G: 375; 3 INJECTION, SOLUTION INTRAVENOUS at 22:03

## 2022-07-11 RX ADMIN — LOSARTAN POTASSIUM 50 MG: 50 TABLET, FILM COATED ORAL at 08:55

## 2022-07-11 RX ADMIN — SODIUM CHLORIDE, POTASSIUM CHLORIDE, SODIUM LACTATE AND CALCIUM CHLORIDE 1000 ML: 600; 310; 30; 20 INJECTION, SOLUTION INTRAVENOUS at 20:50

## 2022-07-11 RX ADMIN — FAMOTIDINE 40 MG: 20 TABLET, FILM COATED ORAL at 20:18

## 2022-07-11 RX ADMIN — TAZOBACTAM SODIUM AND PIPERACILLIN SODIUM 3.38 G: 375; 3 INJECTION, SOLUTION INTRAVENOUS at 16:16

## 2022-07-11 RX ADMIN — HYDROMORPHONE HYDROCHLORIDE 0.3 MG: 1 INJECTION, SOLUTION INTRAMUSCULAR; INTRAVENOUS; SUBCUTANEOUS at 01:00

## 2022-07-11 RX ADMIN — ENOXAPARIN SODIUM 40 MG: 100 INJECTION SUBCUTANEOUS at 20:18

## 2022-07-11 RX ADMIN — HYDROMORPHONE HYDROCHLORIDE 0.5 MG: 1 INJECTION, SOLUTION INTRAMUSCULAR; INTRAVENOUS; SUBCUTANEOUS at 04:25

## 2022-07-11 ASSESSMENT — ACTIVITIES OF DAILY LIVING (ADL)
CHANGE_IN_FUNCTIONAL_STATUS_SINCE_ONSET_OF_CURRENT_ILLNESS/INJURY: NO
WALKING_OR_CLIMBING_STAIRS_DIFFICULTY: NO
ADLS_ACUITY_SCORE: 35
DIFFICULTY_EATING/SWALLOWING: NO
ADLS_ACUITY_SCORE: 35
DOING_ERRANDS_INDEPENDENTLY_DIFFICULTY: NO
TOILETING_ISSUES: NO
ADLS_ACUITY_SCORE: 20
CONCENTRATING,_REMEMBERING_OR_MAKING_DECISIONS_DIFFICULTY: NO
ADLS_ACUITY_SCORE: 20
VISION_MANAGEMENT: GLASSES
FALL_HISTORY_WITHIN_LAST_SIX_MONTHS: NO
WEAR_GLASSES_OR_BLIND: YES
DRESSING/BATHING_DIFFICULTY: NO

## 2022-07-11 NOTE — ED PROVIDER NOTES
Emergency Department Patient Sign-out       Brief HPI:  This is a 72 year old male signed out to me by Dr. PRIYANKA Laura at the end of his shift at 2.20pm .  See Dr Laura's ED note for additional details of care provided prior to shift change and handoff.  Patient is requiring inpatient care due to concern for acute pancreatitis.  His lipasemia is trending downwards services LFTs.  He underwent a laparoscopic cholecystectomy on June 9, 2022 and presented with 3 days of worsening abdominal pain.  He was found to have pancreatitis on CT and after consultation with GI on-call and MRCP was completed to exclude common bile duct stone.  MRCP did not reveal any findings to suggest ductal stone or choledocholithiasis and confirm no sequela from his recent cholecystectomy.  Patient is on IV antibiotics and is currently n.p.o. and IV fluids.  He is appropriate for admission to [facility] if a bed becomes available otherwise a bed search continue.  May consider discontinuing empiric antimicrobials     Significant Events after my assuming care: Patient was initially seen and examined at 3:30 PM.  Reviewed handoff and plan of care.  We discussed plan to admit for pancreatitis-NOS. we discussed plan to advance his diet to clear liquids.  He rated his pain a 3 out of 10.  He confirmed that he lives in [facility].  We also reviewed history is reported to the initial treating provider prior to shift change and handoff.  Patient reports he still feels that he has some discomfort as his pain medication wears off.    Spoke with GEORGES Ingram PA-C- admitting provider at 3.50pm who agreed to accept patient for admission with triglycerides pending. OK to admit to medicine if triglycerides are normal or <1000 (no need of IV insulin).    Triglycerides are normal. Plan to admit to medicine for medical management of pancreatitis NOS status post laparoscopic cholecystectomy on June 9, 2022.       ED to Inpatient  Handoff:    Discussed with GEORGES Ingram PA-C at 3.50pm  Patient accepted for Inpatient Stay  Pending studies include None  Code Status: Not Addressed         Exam:   Patient Vitals for the past 24 hrs:   BP Temp Temp src Pulse Resp SpO2   07/11/22 1943 (!) 147/84 98.1  F (36.7  C) Oral 104 20 96 %   07/11/22 1700 135/72 -- -- 72 -- 97 %   07/11/22 1600 117/63 -- -- 64 -- 95 %   07/11/22 1500 117/60 -- -- 64 -- 93 %   07/11/22 1301 130/68 -- -- 64 16 97 %   07/11/22 1100 124/74 -- -- 60 15 98 %   07/11/22 1000 139/74 -- -- 60 12 98 %   07/11/22 0900 137/82 -- -- 75 11 96 %   07/11/22 0800 131/70 -- -- 58 12 97 %   07/11/22 0700 135/68 -- -- 58 13 97 %   07/11/22 0600 110/69 -- -- 57 12 94 %   07/11/22 0500 122/65 -- -- 61 19 93 %   07/11/22 0400 138/78 -- -- 63 16 96 %   07/11/22 0300 131/67 -- -- 61 12 95 %   07/11/22 0200 132/66 -- -- 60 13 95 %   07/11/22 0100 (!) 144/80 -- -- 67 23 94 %   07/11/22 0000 130/73 -- -- 64 16 97 %   07/10/22 2300 122/66 -- -- 61 14 96 %   07/10/22 2200 124/61 -- -- 76 16 91 %   07/10/22 2100 (!) 147/78 -- -- 82 18 95 %         ED RESULTS:   Results for orders placed or performed during the hospital encounter of 07/10/22 (from the past 24 hour(s))   Comprehensive metabolic panel     Status: Abnormal    Collection Time: 07/11/22  6:26 AM   Result Value Ref Range    Sodium 141 133 - 144 mmol/L    Potassium 4.1 3.4 - 5.3 mmol/L    Chloride 110 (H) 94 - 109 mmol/L    Carbon Dioxide (CO2) 26 20 - 32 mmol/L    Anion Gap 5 3 - 14 mmol/L    Urea Nitrogen 16 7 - 30 mg/dL    Creatinine 0.94 0.66 - 1.25 mg/dL    Calcium 8.7 8.5 - 10.1 mg/dL    Glucose 82 70 - 99 mg/dL    Alkaline Phosphatase 255 (H) 40 - 150 U/L     (H) 0 - 45 U/L     (H) 0 - 70 U/L    Protein Total 7.4 6.8 - 8.8 g/dL    Albumin 2.9 (L) 3.4 - 5.0 g/dL    Bilirubin Total 1.6 (H) 0.2 - 1.3 mg/dL    GFR Estimate 85 >60 mL/min/1.73m2   Lipase     Status: Abnormal    Collection Time: 07/11/22  6:26 AM   Result Value Ref  Range    Lipase 4,534 (H) 73 - 393 U/L   CBC with platelets, differential     Status: Abnormal    Collection Time: 07/11/22  6:26 AM    Narrative    The following orders were created for panel order CBC with platelets, differential.  Procedure                               Abnormality         Status                     ---------                               -----------         ------                     CBC with platelets and d...[[id number]]  Abnormal            Final result                 Please view results for these tests on the individual orders.   CBC with platelets and differential     Status: Abnormal    Collection Time: 07/11/22  6:26 AM   Result Value Ref Range    WBC Count 12.2 (H) 4.0 - 11.0 10e3/uL    RBC Count 3.80 (L) 4.40 - 5.90 10e6/uL    Hemoglobin 11.8 (L) 13.3 - 17.7 g/dL    Hematocrit 37.1 (L) 40.0 - 53.0 %    MCV 98 78 - 100 fL    MCH 31.1 26.5 - 33.0 pg    MCHC 31.8 31.5 - 36.5 g/dL    RDW 14.1 10.0 - 15.0 %    Platelet Count 171 150 - 450 10e3/uL    % Neutrophils 74 %    % Lymphocytes 16 %    % Monocytes 9 %    % Eosinophils 1 %    % Basophils 0 %    % Immature Granulocytes 0 %    NRBCs per 100 WBC 0 <1 /100    Absolute Neutrophils 9.0 (H) 1.6 - 8.3 10e3/uL    Absolute Lymphocytes 1.9 0.8 - 5.3 10e3/uL    Absolute Monocytes 1.1 0.0 - 1.3 10e3/uL    Absolute Eosinophils 0.1 0.0 - 0.7 10e3/uL    Absolute Basophils 0.0 0.0 - 0.2 10e3/uL    Absolute Immature Granulocytes 0.0 <=0.4 10e3/uL    Absolute NRBCs 0.0 10e3/uL   Triglycerides     Status: Normal    Collection Time: 07/11/22  6:26 AM   Result Value Ref Range    Triglycerides 79 <150 mg/dL   Abdomen MRI w & w/o contrast mrcp     Status: None    Collection Time: 07/11/22 12:32 PM    Narrative    MRCP Without and With Contrast    CLINICAL HISTORY: Elevated liver enzymes.  Laparoscopic  cholecystectomy June 9.  Evaluate for common bile duct stone, Elevated  liver enzymes.  Laparoscopic cholecystectomy June 9.  Evaluate for  common bile duct  stone    DATE: 7/11/2022 12:32 PM    TECHNIQUE: MRI of the abdomen without and with intravenous contrast  and 3-D MRCP. Contrast dose: 7.5 mL    Comparison study: CT on 7/10/2022 and MRI on 6/9/2022    FINDINGS:  Cholecystectomy. No intrahepatic or extrahepatic biliary ductal  dilatation. No choledocholithiasis. No fluid collections in the  gallbladder fossa.     T2 hyperintense signal, edema and fluid surrounding the pancreatic  body and tail and in the left lower quadrant, consistent with known  acute pancreatitis. No peripancreatic fluid collections.    Partly visualized atelectasis in the anterior right middle lobe. Small  right pleural effusion. The liver, spleen, and adrenal glands are  normal. A few renal cysts and otherwise normal kidneys. Small hiatal  hernia. Duodenal ampullary diverticulum. Normal caliber loops of  visualized small bowel and colon. Colonic diverticulosis.      Impression    IMPRESSION:   1. Acute pancreatitis without complications, not significantly changed  since the CT yesterday.  2. No biliary ductal dilatation or choledocholithiasis.  Cholecystectomy.    KAREN MALIK MD         SYSTEM ID:  [id number]   Lactic Acid STAT     Status: Abnormal    Collection Time: 07/11/22  8:14 PM   Result Value Ref Range    Lactic Acid 2.5 (H) 0.7 - 2.0 mmol/L       ED MEDICATIONS:   Medications   lactated ringers infusion ( Intravenous Restarted 7/11/22 1300)   HYDROmorphone (PF) (DILAUDID) injection 0.3-0.5 mg (0.5 mg Intravenous Given 7/11/22 1259)   atenolol (TENORMIN) tablet 100 mg (100 mg Oral Given 7/11/22 0855)   losartan (COZAAR) tablet 50 mg (50 mg Oral Given 7/11/22 0855)   piperacillin-tazobactam (ZOSYN) infusion 3.375 g (0 g Intravenous Stopped 7/11/22 1507)   HYDROmorphone (PF) (DILAUDID) injection 0.3 mg (0.3 mg Intravenous Given 7/10/22 1438)   ondansetron (ZOFRAN) injection 4 mg (4 mg Intravenous Given 7/10/22 1437)   0.9% sodium chloride BOLUS (0 mLs Intravenous Stopped 7/10/22 1931)    iopamidol (ISOVUE-370) solution 70 mL (70 mLs Intravenous Given 7/10/22 1617)   sodium chloride 0.9 % bag 500mL for CT scan flush use (59 mLs As instructed Given 7/10/22 1617)   0.9% sodium chloride BOLUS (0 mLs Intravenous Stopped 7/10/22 1929)   HYDROmorphone (PF) (DILAUDID) injection 0.3 mg (0.3 mg Intravenous Given 7/11/22 0100)   gadobutrol (GADAVIST) injection 7.5 mL (7.5 mLs Intravenous Given 7/11/22 1231)   0.9% sodium chloride BOLUS (0 mLs Intravenous Stopped 7/11/22 1300)         Impression:    ICD-10-CM    1. Acute pancreatitis, unspecified complication status, unspecified pancreatitis type  K85.90    2. Elevated liver enzymes  R74.8    3. History of cholecystectomy  Z90.49        Plan:    Admit to medicine for further management and care for acute pancreatitis-exact cause is unknown    MD Angela Coffman Ebenezer Tope, MD  07/11/22 4471

## 2022-07-11 NOTE — ED PROVIDER NOTES
"     Emergency Department Patient Sign-out       Brief HPI:  This is a 72 year old male signed out to me by Dr. Ramon .  See initial ED Provider note for details of the presentation. Has acute pancreatitis. Needs transfer for ERCP per GI at the  of            Significant Events prior to my assuming care: none      Exam:   Patient Vitals for the past 24 hrs:   BP Temp Temp src Pulse Resp SpO2 Height Weight   07/10/22 2100 (!) 147/78 -- -- 82 18 95 % -- --   07/10/22 2000 136/71 -- -- 84 19 95 % -- --   07/10/22 1900 (!) 141/66 -- -- 91 25 96 % -- --   07/10/22 1800 (!) 146/73 -- -- 89 18 98 % -- --   07/10/22 1500 137/70 -- -- 79 18 92 % -- --   07/10/22 1430 (!) 142/75 -- -- 67 -- 96 % -- --   07/10/22 1405 127/62 98.7  F (37.1  C) Oral 67 20 96 % 1.676 m (5' 6\") 72.1 kg (159 lb)           ED RESULTS:   Results for orders placed or performed during the hospital encounter of 07/10/22 (from the past 24 hour(s))   [place] Draw     Status: None    Collection Time: 07/10/22  2:27 PM    Narrative    The following orders were created for panel order [place] Draw.  Procedure                               Abnormality         Status                     ---------                               -----------         ------                     Extra Blue Top Tube[[id number]]                              Final result               Extra Red Top Tube[[id number]]                               Final result               Extra Green Top (Lithium...[[id number]]                      Final result               Extra Purple Top Tube[[id number]]                            Final result               Extra Green Top (Lithium...[[id number]]                      Final result                 Please view results for these tests on the individual orders.   Extra Blue Top Tube     Status: None    Collection Time: 07/10/22  2:27 PM   Result Value Ref Range    Hold Specimen JIC    Extra Red Top Tube     Status: None    Collection Time: 07/10/22  2:27 PM "   Result Value Ref Range    Hold Specimen JIC    Extra Green Top (Lithium Heparin) Tube     Status: None    Collection Time: 07/10/22  2:27 PM   Result Value Ref Range    Hold Specimen JIC    Extra Purple Top Tube     Status: None    Collection Time: 07/10/22  2:27 PM   Result Value Ref Range    Hold Specimen JIC    Extra Green Top (Lithium Heparin) ON ICE     Status: None    Collection Time: 07/10/22  2:27 PM   Result Value Ref Range    Hold Specimen JIC    CBC with platelets differential     Status: Abnormal    Collection Time: 07/10/22  2:27 PM    Narrative    The following orders were created for panel order CBC with platelets differential.  Procedure                               Abnormality         Status                     ---------                               -----------         ------                     CBC with platelets and d...[[id number]]  Abnormal            Final result                 Please view results for these tests on the individual orders.   Troponin I     Status: Normal    Collection Time: 07/10/22  2:27 PM   Result Value Ref Range    Troponin I High Sensitivity <3 <79 ng/L   CBC with platelets and differential     Status: Abnormal    Collection Time: 07/10/22  2:27 PM   Result Value Ref Range    WBC Count 21.3 (H) 4.0 - 11.0 10e3/uL    RBC Count 4.33 (L) 4.40 - 5.90 10e6/uL    Hemoglobin 13.4 13.3 - 17.7 g/dL    Hematocrit 41.1 40.0 - 53.0 %    MCV 95 78 - 100 fL    MCH 30.9 26.5 - 33.0 pg    MCHC 32.6 31.5 - 36.5 g/dL    RDW 13.6 10.0 - 15.0 %    Platelet Count 251 150 - 450 10e3/uL    % Neutrophils 85 %    % Lymphocytes 6 %    % Monocytes 8 %    % Eosinophils 1 %    % Basophils 0 %    % Immature Granulocytes 0 %    NRBCs per 100 WBC 0 <1 /100    Absolute Neutrophils 18.0 (H) 1.6 - 8.3 10e3/uL    Absolute Lymphocytes 1.4 0.8 - 5.3 10e3/uL    Absolute Monocytes 1.7 (H) 0.0 - 1.3 10e3/uL    Absolute Eosinophils 0.1 0.0 - 0.7 10e3/uL    Absolute Basophils 0.1 0.0 - 0.2 10e3/uL    Absolute  Immature Granulocytes 0.1 <=0.4 10e3/uL    Absolute NRBCs 0.0 10e3/uL   Comprehensive metabolic panel     Status: Abnormal    Collection Time: 07/10/22  3:48 PM   Result Value Ref Range    Sodium 140 133 - 144 mmol/L    Potassium 5.0 3.4 - 5.3 mmol/L    Chloride 110 (H) 94 - 109 mmol/L    Carbon Dioxide (CO2) 27 20 - 32 mmol/L    Anion Gap 3 3 - 14 mmol/L    Urea Nitrogen 16 7 - 30 mg/dL    Creatinine 1.08 0.66 - 1.25 mg/dL    Calcium 9.0 8.5 - 10.1 mg/dL    Glucose 100 (H) 70 - 99 mg/dL    Alkaline Phosphatase 294 (H) 40 - 150 U/L     (H) 0 - 45 U/L     (H) 0 - 70 U/L    Protein Total 8.0 6.8 - 8.8 g/dL    Albumin 3.3 (L) 3.4 - 5.0 g/dL    Bilirubin Total 4.3 (H) 0.2 - 1.3 mg/dL    GFR Estimate 72 >60 mL/min/1.73m2   Lipase     Status: Abnormal    Collection Time: 07/10/22  3:48 PM   Result Value Ref Range    Lipase 29,919 (H) 73 - 393 U/L   CT Abdomen Pelvis w Contrast     Status: None    Collection Time: 07/10/22  4:29 PM    Narrative    EXAM: CT ABDOMEN PELVIS W CONTRAST  LOCATION: [facility]  DATE/TIME: 7/10/2022 4:18 PM    INDICATION: Generalized abdominal pain with nausea vomiting, 4 weeks s p cholecystectomy  COMPARISON: 6/9/2022  TECHNIQUE: CT scan of the abdomen and pelvis was performed following injection of IV contrast. Multiplanar reformats were obtained. Dose reduction techniques were used.  CONTRAST: 70mL Isovue 370    FINDINGS:   LOWER CHEST: Calcified granulomas right lower lobe. Strands of scarring or atelectasis in the right lung base. Small sliding-type esophageal hiatal hernia.    HEPATOBILIARY: Cholecystectomy.    PANCREAS: Moderate inflammation and free fluid surrounding the pancreatic tail in the lesser sac. No focal fluid collection. Normal pancreatic enhancement with no necrosis.    SPLEEN: Calcified granuloma. Small accessory splenule.    ADRENAL GLANDS: Normal.    KIDNEYS/BLADDER: Bilateral kidney cysts, requiring no follow-up.    BOWEL: Duodenal  diverticulum. Partial colectomy with sigmoid colocolostomy. Scattered colonic diverticula. No diverticulitis. Normal appendix.    LYMPH NODES: Normal.    VASCULATURE: Minimal atherosclerosis. Patent splenic vein. No arterial pseudoaneurysm.    PELVIC ORGANS: Moderately enlarged prostate. Pelvic phleboliths.    MUSCULOSKELETAL: Mild degenerative change in the spine.      Impression    IMPRESSION:   Uncomplicated acute pancreatitis   Blood gas venous     Status: Abnormal    Collection Time: 07/10/22  4:52 PM   Result Value Ref Range    pH Venous 7.37 7.32 - 7.43    pCO2 Venous 46 40 - 50 mm Hg    pO2 Venous 22 (L) 25 - 47 mm Hg    Bicarbonate Venous 26 21 - 28 mmol/L    Base Excess/Deficit (+/-) 0.6 -7.7 - 1.9 mmol/L    FIO2 0    Asymptomatic COVID-19 Virus (Coronavirus) by PCR Nose     Status: Normal    Collection Time: 07/10/22  7:17 PM    Specimen: Nose; Swab   Result Value Ref Range    SARS CoV2 PCR Negative Negative    Narrative    Testing was performed using the dhruv  SARS-CoV-2 & Influenza A/B Assay on the dhruv  [place]  System.  This test should be ordered for the detection of SARS-COV-2 in individuals who meet SARS-CoV-2 clinical and/or epidemiological criteria. Test performance is unknown in asymptomatic patients.  This test is for in vitro diagnostic use under the FDA EUA for laboratories certified under CLIA to perform moderate and/or high complexity testing. This test has not been FDA cleared or approved.  A negative test does not rule out the presence of PCR inhibitors in the specimen or target RNA in concentration below the limit of detection for the assay. The possibility of a false negative should be considered if the patient's recent exposure or clinical presentation suggests COVID-19.  [facility] are certified under the Clinical Laboratory Improvement Amendments of 1988 (CLIA-88) as qualified to perform moderate and/or high complexity laboratory testing.       ED MEDICATIONS:    Medications   HYDROmorphone (PF) (DILAUDID) injection 0.3 mg (0.3 mg Intravenous Given 7/10/22 2116)   lactated ringers infusion ( Intravenous Rate/Dose Verify 7/10/22 2116)   HYDROmorphone (PF) (DILAUDID) injection 0.3 mg (0.3 mg Intravenous Given 7/10/22 1438)   ondansetron (ZOFRAN) injection 4 mg (4 mg Intravenous Given 7/10/22 1437)   0.9% sodium chloride BOLUS (0 mLs Intravenous Stopped 7/10/22 1931)   iopamidol (ISOVUE-370) solution 70 mL (70 mLs Intravenous Given 7/10/22 1617)   sodium chloride 0.9 % bag 500mL for CT scan flush use (59 mLs As instructed Given 7/10/22 1617)   0.9% sodium chloride BOLUS (0 mLs Intravenous Stopped 7/10/22 1929)         Impression:    ICD-10-CM    1. Acute pancreatitis, unspecified complication status, unspecified pancreatitis type  K85.90    2. Elevated liver enzymes  R74.8    3. History of cholecystectomy  Z90.49        Plan:    Pending studies include none. Needs ERCP tomorrow      6:13 AM: Patient was signed out at shift change to MD Thang Monteiro Christopher James, MD  07/11/22 0614

## 2022-07-11 NOTE — H&P
"[facility]    History and Physical - Hospitalist Service       Date of Admission:  7/10/2022    Assessment & Plan      Willian Reid is a 72 year old male admitted on 7/10/2022. He presented to the emergency department for evaluation of abdominal pain, back pain, and retching and was found to have acute pancreatitis likely due to biliary obstruction now resolving for which he is being admitted for further evaluation and treatment.     Acute pancreatitis, likely due to biliary obstruction - resolving  Elevated liver enzymes and total bilirubin   History of cholecystectomy on 6/9/22  Recent history of cholecystectomy on 6/9/22. Presented with recurring abdominal pain and lipase 20717 / total bilirubin 4.3 / alk phos 294 /  / . CT abdomen & pelvis 7/10/22 demonstrates \"uncomplicated acute pancreatitis.\" MRCP 7/11/22 demonstrates \"acute pancreatitis without complications, not significantly changed since CT yesterday [7/10/22]. No biliary ductal dilatation or choledocholithiasis.\" Triglycerides within normal limits (79).   Cause of acute pancreatitis appears to be due to biliary obstruction either from stone that has since passed, or sludge.   Initially tried to transfer patient from emergency department to outside facility able to do ERCP, but no beds available. MRCP then pursued with results noted above, as well as trend toward improvement of all labs. Admission plan was re-visited after discussion with general surgery and it was determined patient may not need an ERCP and could be admitted at [facility].  Lipase 26175 ? 4534  Total bilirubin 4.3 ? 1.6  Alk phos 294 ?  255   ?  300   ? 124  - Admit to [facility] with close monitoring of labs; if worsening (specifically bilirubin), will need transfer for ERCP  - General Surgery consult not necessary, but they are aware of patient and could consult if necessary (although if patient is worsening, " "consulting GI and higher level of care would be the appropriate next step)  - Analgesia: prn oxycodone 2.5-5 mg q4h prn and IV hydromorphone 0.2 - 0.5 mg q2h  - Daily CMP, lipase in am   - LR 1L bolus, followed by maintenance @ 150 ml/hr    Leukocytosis with left shift  Admit WBC 21.3, ANC 18.0. Afebrile. Source is likely secondary to pancreatitis.   Leukocytosis / ANC trending toward improvement.   - Zosyn 3.375 mg q6h  - CBC in am     Elevated lactic acid  - resolved  Patient triggered sepsis BPA, lactic elevated at 2.5. Normalized to 1.3 after 1L LR bolus.  - Resolved    Essential hypertension  Managed prior to admission with atenolol 100 mg daily and losartan 50 mg daily.   - Continue home medications with holding parameters    Gastroesophageal reflux disease, unspecified whether esophagitis present  Managed prior to admission with Pepcid 40 mg daily, continue.     Bronchitis with bronchospasm  Occurs occasionally, mostly after mowing the lawn. No current symptoms at time of admission. Uses prn albuterol inhaler.  - Albuterol nebs available if needed         Diet: Clear Liquid Diet    DVT Prophylaxis: Enoxaparin (Lovenox) SQ  Rogers Catheter: Not present  Central Lines: None  Cardiac Monitoring: None  Code Status: Full Code  - discussed at time of admission       Disposition Plan    Expected discharge in 2-3 days, depending on clinical improvement, improvement in labs (especially bilirubin), cessation of pain, and tolerating oral intake.     The patient's care was discussed with the Attending Physician, Dr. Bronson Wolff and Patient.    Anushka Ingram PA-C  Hospitalist Service  [facility]  Securely message with the Vocera Web Console (learn more here)  Text page via [facility] Paging/Directory         ______________________________________________________________________    Chief Complaint   \"My back pain and abdominal pain started up again, and I was retching.\"    History is obtained " from the patient, review of EMR, and emergency department sign out from Dr. Ted Miller.    History of Present Illness   Willian Reid is a 72 year old male who presented to the emergency department for evaluation of abdominal pain and back pain.    Patient was admitted at [facility] for acute cholecystitis, now s/p laparoscopic cholecystectomy on 6/9/2022.  He had been feeling very well after his surgery, although had occasional small bouts of pain.  Five days ago on Thursday 7/7 patient had a fish whitney for a meal, after which his pain returned in his lower back and was constant and steady.  Pain gradually worsened since onset, became very severe yesterday when he attempted to mow the lawn.  He has been retching and dry heaving for the past few days.    He presented to the emergency department yesterday and was found to have acute pancreatitis likely due to a biliary obstruction.  Initially transfer out of the ED to a facility that could perform an ERCP was recommended, but no beds were available.  This morning labs were repeated and an MRCP was done, showing a trend toward improvement in labs and no biliary ductal dilatation or choledocholithiasis noted on MRCP.  After discussion with general surgery, it was determined patient could stay at [facility] monitoring for ongoing trend toward improvement, but would still require transfer for an ERCP if he were to worsen.    Patient denies any alcohol use.  Triglycerides were within normal limits.    Pain started out in his back, which was similar to the pain he had prior to his cholecystectomy.  After all of his retching and dry heaving he developed some pain in his abdomen that he attributed to soreness from retching.  Pain was rated 10 out of 10 at the worst, described as an internal severe dull ache in his back that waxed and waned, no radiation.  He attempted taking Tylenol at home without much relief.  Laying still was helpful for his pain, and retching  temporarily improved his pain.    He has not had much to eat since onset of pain 5 days ago.  He has been feeling cold but does not think he had true chills, no fevers.  He has poor sleep at baseline, unchanged.  Has an occasional cough which is chronic and unchanged.  Typically present after mowing the lawn, resolved with inhaler use.  Has had decreased bowel movements since onset of his pain.  Is feeling generally weak and fatigued.  The remainder review of systems is negative.    Review of Systems    The 10 point Review of Systems is negative other than noted in the HPI or here.     Past Medical History    I have reviewed this patient's medical history and updated it with pertinent information if needed.   Past Medical History:   Diagnosis Date     Diverticulitis of colon (without mention of hemorrhage)(562.11) 01/01/1980     Hypertension      Uncomplicated asthma        Past Surgical History   I have reviewed this patient's surgical history and updated it with pertinent information if needed.  Past Surgical History:   Procedure Laterality Date     FLEXIBLE SIGMOIDOSCOPY  3/2/2005     Flexible sigmoidoscopy probably should be changed to a colonoscopy at his next visit to get further and to help him with sedation, so in 2010 he should have a colonoscopy and then every 10 years afterwards     LAPAROSCOPIC CHOLECYSTECTOMY N/A 6/9/2022    Procedure: CHOLECYSTECTOMY, LAPAROSCOPIC;  Surgeon: Joya Bains MD;  Location: [facility] OR       Social History   I have reviewed this patient's social history and updated it with pertinent information if needed.  Social History     Tobacco Use     Smoking status: Never Smoker     Smokeless tobacco: Never Used   Vaping Use     Vaping Use: Never used   Substance Use Topics     Alcohol use: No     Drug use: No       Family History   I have reviewed this patient's family history and updated it with pertinent information if needed.  Family History   Problem Relation Age of Onset      Osteoporosis Mother      Cancer Mother         facial skin cancers removed     Arthritis Mother         OA     Heart Disease Father      Diabetes Father      Eye Disorder Father      C.A.D. Father      Cancer Maternal Grandmother      Cancer Paternal Grandmother      Heart Disease Paternal Grandfather      Hypertension Maternal Uncle      Cerebrovascular Disease Maternal Uncle      Colon Cancer Cousin 76     Asthma No family hx of      Cancer - colorectal No family hx of      Prostate Cancer No family hx of      Breast Cancer No family hx of        Prior to Admission Medications   Prior to Admission Medications   Prescriptions Last Dose Informant Patient Reported? Taking?   MULTIVITAMINS OR TABS 7/10/2022 at am Self Yes Yes   Sig: Take 1 tablet by mouth daily   Probiotic Product (PRO-BIOTIC BLEND) CAPS 7/10/2022 at am Self Yes Yes   Sig: Take 1 capsule by mouth daily   albuterol (PROAIR HFA/PROVENTIL HFA/VENTOLIN HFA) 108 (90 Base) MCG/ACT inhaler Past Week at Unknown time Self No Yes   Sig: Inhale 2 puffs into the lungs every 4 hours as needed for shortness of breath / dyspnea or wheezing   atenolol (TENORMIN) 100 MG tablet 7/10/2022 at am Self No Yes   Sig: Take 1 tablet (100 mg) by mouth daily   famotidine (PEPCID) 40 MG tablet 7/10/2022 at am Self No Yes   Sig: Take 1 tablet (40 mg) by mouth daily   losartan (COZAAR) 50 MG tablet 7/10/2022 at am Self No Yes   Sig: Take 1 tablet (50 mg) by mouth daily      Facility-Administered Medications: None     Allergies   Allergies   Allergen Reactions     Tetracycline Rash       Physical Exam   Vital Signs: Temp: 98.1  F (36.7  C) Temp src: Oral BP: (!) 147/84 Pulse: 104   Resp: 20 SpO2: 96 % O2 Device: Nasal cannula Oxygen Delivery: 2 LPM  Weight: 159 lbs 0 oz    Constitutional: Alert, oriented, cooperative. No apparent distress, appears nontoxic. Speaking in full coherent sentences.     Eyes: Eyes are clear, pupils are reactive. No scleral icterus.    HEENT: Oropharynx  is clear and moist, no lesions. Normocephalic, no evidence of cranial trauma.      Cardiovascular: Regular rhythm and rate, normal S1 and S2. No murmur, rubs, or gallops. Peripheral pulses intact bilaterally. No lower extremity edema.    Respiratory: Lung sounds are clear to auscultation bilaterally without wheezes, rhonchi, or crackles.    GI: Tympanic to percussion in all quadrants. Firm, slightly distended. Minimally tender to palpation of all quadrants, no rebound or guarding. No hepatosplenomegaly or masses appreciated. Hypoactive bowel sounds.     Musculoskeletal: Without obvious deformity, normal range of motion. Normal muscle bulk and tone. Distal CMS intact.      Skin: Warm and dry, no rashes or ecchymoses. No mottling of skin.      Neurologic: Patient moves all extremities. Gross strength and sensation are equal bilaterally.    Genitourinary: Deferred      Data   Data reviewed today: I reviewed all medications, new labs and imaging results over the last 24 hours. I personally reviewed the abdominal CT image(s) showing inflammation near the pancreas, no obvious biliary dilatation .    Recent Labs   Lab 07/11/22  0626 07/10/22  1548 07/10/22  1427   WBC 12.2*  --  21.3*   HGB 11.8*  --  13.4   MCV 98  --  95     --  251    140  --    POTASSIUM 4.1 5.0  --    CHLORIDE 110* 110*  --    CO2 26 27  --    BUN 16 16  --    CR 0.94 1.08  --    ANIONGAP 5 3  --    ROSSY 8.7 9.0  --    GLC 82 100*  --    ALBUMIN 2.9* 3.3*  --    PROTTOTAL 7.4 8.0  --    BILITOTAL 1.6* 4.3*  --    ALKPHOS 255* 294*  --    * 426*  --    * 246*  --    LIPASE 4,534* 29,919*  --      Recent Results (from the past 24 hour(s))   Abdomen MRI w & w/o contrast mrcp    Narrative    MRCP Without and With Contrast    CLINICAL HISTORY: Elevated liver enzymes.  Laparoscopic  cholecystectomy June 9.  Evaluate for common bile duct stone, Elevated  liver enzymes.  Laparoscopic cholecystectomy June 9.  Evaluate for  common bile  duct stone    DATE: 7/11/2022 12:32 PM    TECHNIQUE: MRI of the abdomen without and with intravenous contrast  and 3-D MRCP. Contrast dose: 7.5 mL    Comparison study: CT on 7/10/2022 and MRI on 6/9/2022    FINDINGS:  Cholecystectomy. No intrahepatic or extrahepatic biliary ductal  dilatation. No choledocholithiasis. No fluid collections in the  gallbladder fossa.     T2 hyperintense signal, edema and fluid surrounding the pancreatic  body and tail and in the left lower quadrant, consistent with known  acute pancreatitis. No peripancreatic fluid collections.    Partly visualized atelectasis in the anterior right middle lobe. Small  right pleural effusion. The liver, spleen, and adrenal glands are  normal. A few renal cysts and otherwise normal kidneys. Small hiatal  hernia. Duodenal ampullary diverticulum. Normal caliber loops of  visualized small bowel and colon. Colonic diverticulosis.      Impression    IMPRESSION:   1. Acute pancreatitis without complications, not significantly changed  since the CT yesterday.  2. No biliary ductal dilatation or choledocholithiasis.  Cholecystectomy.    KAREN MALIK MD         SYSTEM ID:  [id number]

## 2022-07-11 NOTE — ED PROVIDER NOTES
Emergency Department Patient Sign-out       Brief HPI:  This is a 72 year old male signed out to me by Dr. Desir .  See initial ED Provider note for details of the presentation.            Significant Events prior to my assuming care:   72 year old male status post lap yudy on June 9 presenting with abdominal pain, bloating, nausea, and vomiting.  Pain worsened over the past 3 days.  Patient found to have pancreatitis on CT, with liver enzymes elevated.  This was discussed with on-call surgeon recommending transfer for GI with likely ERCP.  No beds available.  Patient awaiting transport to facility with ERCP capability.      Recheck of labs this morning show improved values.  Lipase is now down to 4500.  LFTs are improved, with bilirubin which is also improved.  White blood cell count is normalizing, currently 12.2.    I did have discussion with GI provider at the [facility].  Dr. Woody recommended conservative management, with low threshold for antibiotics.  Recommended additional imaging to rule out stone, as I do not have current ability to transfer patient to the [facility].    I ordered MRCP.  MRCP test results showed no evidence of biliary ductal dilatation or choledocholithiasis.  Has had prior cholecystectomy.  There is acute pancreatitis without any complications that is seen on MRCP.  Uncertain exact cause of patient's acute pancreatitis.  Patient has remained n.p.o., on IV fluids.  He is appropriate for admission to [facility].  No beds currently available, and patient will be boarded in the emergency department until bed becomes available.  Antibiotics had been administered, however will likely discontinue.    Exam:   Patient Vitals for the past 24 hrs:   BP Temp Temp src Pulse Resp SpO2 Height Weight   07/11/22 0500 122/65 -- -- 61 19 93 % -- --   07/11/22 0400 138/78 -- -- 63 16 96 % -- --   07/11/22 0300 131/67 -- -- 61 12 95 % -- --   07/11/22 0200 132/66 -- --  "60 13 95 % -- --   07/11/22 0100 (!) 144/80 -- -- 67 23 94 % -- --   07/11/22 0000 130/73 -- -- 64 16 97 % -- --   07/10/22 2300 122/66 -- -- 61 14 96 % -- --   07/10/22 2200 124/61 -- -- 76 16 91 % -- --   07/10/22 2100 (!) 147/78 -- -- 82 18 95 % -- --   07/10/22 2000 136/71 -- -- 84 19 95 % -- --   07/10/22 1900 (!) 141/66 -- -- 91 25 96 % -- --   07/10/22 1800 (!) 146/73 -- -- 89 18 98 % -- --   07/10/22 1500 137/70 -- -- 79 18 92 % -- --   07/10/22 1430 (!) 142/75 -- -- 67 -- 96 % -- --   07/10/22 1405 127/62 98.7  F (37.1  C) Oral 67 20 96 % 1.676 m (5' 6\") 72.1 kg (159 lb)           ED RESULTS:   Results for orders placed or performed during the hospital encounter of 07/10/22 (from the past 24 hour(s))   Duncan Draw     Status: None    Collection Time: 07/10/22  2:27 PM    Narrative    The following orders were created for panel order Duncan Draw.  Procedure                               Abnormality         Status                     ---------                               -----------         ------                     Extra Blue Top Tube[[id number]]                              Final result               Extra Red Top Tube[[id number]]                               Final result               Extra Green Top (Lithium...[[id number]]                      Final result               Extra Purple Top Tube[[id number]]                            Final result               Extra Green Top (Lithium...[[id number]]                      Final result                 Please view results for these tests on the individual orders.   Extra Blue Top Tube     Status: None    Collection Time: 07/10/22  2:27 PM   Result Value Ref Range    Hold Specimen JIC    Extra Red Top Tube     Status: None    Collection Time: 07/10/22  2:27 PM   Result Value Ref Range    Hold Specimen JIC    Extra Green Top (Lithium Heparin) Tube     Status: None    Collection Time: 07/10/22  2:27 PM   Result Value Ref Range    Hold Specimen JIC    Extra Purple " Top Tube     Status: None    Collection Time: 07/10/22  2:27 PM   Result Value Ref Range    Hold Specimen JIC    Extra Green Top (Lithium Heparin) ON ICE     Status: None    Collection Time: 07/10/22  2:27 PM   Result Value Ref Range    Hold Specimen JI    CBC with platelets differential     Status: Abnormal    Collection Time: 07/10/22  2:27 PM    Narrative    The following orders were created for panel order CBC with platelets differential.  Procedure                               Abnormality         Status                     ---------                               -----------         ------                     CBC with platelets and d...[[id number]]  Abnormal            Final result                 Please view results for these tests on the individual orders.   Troponin I     Status: Normal    Collection Time: 07/10/22  2:27 PM   Result Value Ref Range    Troponin I High Sensitivity <3 <79 ng/L   CBC with platelets and differential     Status: Abnormal    Collection Time: 07/10/22  2:27 PM   Result Value Ref Range    WBC Count 21.3 (H) 4.0 - 11.0 10e3/uL    RBC Count 4.33 (L) 4.40 - 5.90 10e6/uL    Hemoglobin 13.4 13.3 - 17.7 g/dL    Hematocrit 41.1 40.0 - 53.0 %    MCV 95 78 - 100 fL    MCH 30.9 26.5 - 33.0 pg    MCHC 32.6 31.5 - 36.5 g/dL    RDW 13.6 10.0 - 15.0 %    Platelet Count 251 150 - 450 10e3/uL    % Neutrophils 85 %    % Lymphocytes 6 %    % Monocytes 8 %    % Eosinophils 1 %    % Basophils 0 %    % Immature Granulocytes 0 %    NRBCs per 100 WBC 0 <1 /100    Absolute Neutrophils 18.0 (H) 1.6 - 8.3 10e3/uL    Absolute Lymphocytes 1.4 0.8 - 5.3 10e3/uL    Absolute Monocytes 1.7 (H) 0.0 - 1.3 10e3/uL    Absolute Eosinophils 0.1 0.0 - 0.7 10e3/uL    Absolute Basophils 0.1 0.0 - 0.2 10e3/uL    Absolute Immature Granulocytes 0.1 <=0.4 10e3/uL    Absolute NRBCs 0.0 10e3/uL   Comprehensive metabolic panel     Status: Abnormal    Collection Time: 07/10/22  3:48 PM   Result Value Ref Range    Sodium 140 133 -  144 mmol/L    Potassium 5.0 3.4 - 5.3 mmol/L    Chloride 110 (H) 94 - 109 mmol/L    Carbon Dioxide (CO2) 27 20 - 32 mmol/L    Anion Gap 3 3 - 14 mmol/L    Urea Nitrogen 16 7 - 30 mg/dL    Creatinine 1.08 0.66 - 1.25 mg/dL    Calcium 9.0 8.5 - 10.1 mg/dL    Glucose 100 (H) 70 - 99 mg/dL    Alkaline Phosphatase 294 (H) 40 - 150 U/L     (H) 0 - 45 U/L     (H) 0 - 70 U/L    Protein Total 8.0 6.8 - 8.8 g/dL    Albumin 3.3 (L) 3.4 - 5.0 g/dL    Bilirubin Total 4.3 (H) 0.2 - 1.3 mg/dL    GFR Estimate 72 >60 mL/min/1.73m2   Lipase     Status: Abnormal    Collection Time: 07/10/22  3:48 PM   Result Value Ref Range    Lipase 29,919 (H) 73 - 393 U/L   CT Abdomen Pelvis w Contrast     Status: None    Collection Time: 07/10/22  4:29 PM    Narrative    EXAM: CT ABDOMEN PELVIS W CONTRAST  LOCATION: [facility]  DATE/TIME: 7/10/2022 4:18 PM    INDICATION: Generalized abdominal pain with nausea vomiting, 4 weeks s p cholecystectomy  COMPARISON: 6/9/2022  TECHNIQUE: CT scan of the abdomen and pelvis was performed following injection of IV contrast. Multiplanar reformats were obtained. Dose reduction techniques were used.  CONTRAST: 70mL Isovue 370    FINDINGS:   LOWER CHEST: Calcified granulomas right lower lobe. Strands of scarring or atelectasis in the right lung base. Small sliding-type esophageal hiatal hernia.    HEPATOBILIARY: Cholecystectomy.    PANCREAS: Moderate inflammation and free fluid surrounding the pancreatic tail in the lesser sac. No focal fluid collection. Normal pancreatic enhancement with no necrosis.    SPLEEN: Calcified granuloma. Small accessory splenule.    ADRENAL GLANDS: Normal.    KIDNEYS/BLADDER: Bilateral kidney cysts, requiring no follow-up.    BOWEL: Duodenal diverticulum. Partial colectomy with sigmoid colocolostomy. Scattered colonic diverticula. No diverticulitis. Normal appendix.    LYMPH NODES: Normal.    VASCULATURE: Minimal atherosclerosis. Patent splenic  vein. No arterial pseudoaneurysm.    PELVIC ORGANS: Moderately enlarged prostate. Pelvic phleboliths.    MUSCULOSKELETAL: Mild degenerative change in the spine.      Impression    IMPRESSION:   Uncomplicated acute pancreatitis   Blood gas venous     Status: Abnormal    Collection Time: 07/10/22  4:52 PM   Result Value Ref Range    pH Venous 7.37 7.32 - 7.43    pCO2 Venous 46 40 - 50 mm Hg    pO2 Venous 22 (L) 25 - 47 mm Hg    Bicarbonate Venous 26 21 - 28 mmol/L    Base Excess/Deficit (+/-) 0.6 -7.7 - 1.9 mmol/L    FIO2 0    Asymptomatic COVID-19 Virus (Coronavirus) by PCR Nose     Status: Normal    Collection Time: 07/10/22  7:17 PM    Specimen: Nose; Swab   Result Value Ref Range    SARS CoV2 PCR Negative Negative    Narrative    Testing was performed using the dhruv  SARS-CoV-2 & Influenza A/B Assay on the dhruv  [place]  System.  This test should be ordered for the detection of SARS-COV-2 in individuals who meet SARS-CoV-2 clinical and/or epidemiological criteria. Test performance is unknown in asymptomatic patients.  This test is for in vitro diagnostic use under the FDA EUA for laboratories certified under CLIA to perform moderate and/or high complexity testing. This test has not been FDA cleared or approved.  A negative test does not rule out the presence of PCR inhibitors in the specimen or target RNA in concentration below the limit of detection for the assay. The possibility of a false negative should be considered if the patient's recent exposure or clinical presentation suggests COVID-19.  [facility] are certified under the Clinical Laboratory Improvement Amendments of 1988 (CLIA-88) as qualified to perform moderate and/or high complexity laboratory testing.       ED MEDICATIONS:   Medications   lactated ringers infusion ( Intravenous New Bag 7/11/22 9417)   HYDROmorphone (PF) (DILAUDID) injection 0.3-0.5 mg (0.5 mg Intravenous Given 7/11/22 9745)   HYDROmorphone (PF) (DILAUDID)  injection 0.3 mg (0.3 mg Intravenous Given 7/10/22 1438)   ondansetron (ZOFRAN) injection 4 mg (4 mg Intravenous Given 7/10/22 1437)   0.9% sodium chloride BOLUS (0 mLs Intravenous Stopped 7/10/22 1931)   iopamidol (ISOVUE-370) solution 70 mL (70 mLs Intravenous Given 7/10/22 1617)   sodium chloride 0.9 % bag 500mL for CT scan flush use (59 mLs As instructed Given 7/10/22 1617)   0.9% sodium chloride BOLUS (0 mLs Intravenous Stopped 7/10/22 1929)   HYDROmorphone (PF) (DILAUDID) injection 0.3 mg (0.3 mg Intravenous Given 7/11/22 0100)         Impression:    ICD-10-CM    1. Acute pancreatitis, unspecified complication status, unspecified pancreatitis type  K85.90    2. Elevated liver enzymes  R74.8    3. History of cholecystectomy  Z90.49              MD Valentín Coffman, Kirk Carr MD  07/11/22 8879

## 2022-07-12 LAB
ALBUMIN SERPL-MCNC: 2.5 G/DL (ref 3.4–5)
ALP SERPL-CCNC: 199 U/L (ref 40–150)
ALT SERPL W P-5'-P-CCNC: 179 U/L (ref 0–70)
ANION GAP SERPL CALCULATED.3IONS-SCNC: 4 MMOL/L (ref 3–14)
AST SERPL W P-5'-P-CCNC: 57 U/L (ref 0–45)
BASOPHILS # BLD AUTO: 0 10E3/UL (ref 0–0.2)
BASOPHILS NFR BLD AUTO: 0 %
BILIRUB SERPL-MCNC: 1.6 MG/DL (ref 0.2–1.3)
BUN SERPL-MCNC: 15 MG/DL (ref 7–30)
CALCIUM SERPL-MCNC: 8.6 MG/DL (ref 8.5–10.1)
CHLORIDE BLD-SCNC: 109 MMOL/L (ref 94–109)
CO2 SERPL-SCNC: 26 MMOL/L (ref 20–32)
CREAT SERPL-MCNC: 0.91 MG/DL (ref 0.66–1.25)
EOSINOPHIL # BLD AUTO: 0 10E3/UL (ref 0–0.7)
EOSINOPHIL NFR BLD AUTO: 0 %
ERYTHROCYTE [DISTWIDTH] IN BLOOD BY AUTOMATED COUNT: 14.1 % (ref 10–15)
GFR SERPL CREATININE-BSD FRML MDRD: 88 ML/MIN/1.73M2
GLUCOSE BLD-MCNC: 95 MG/DL (ref 70–99)
HCT VFR BLD AUTO: 33.2 % (ref 40–53)
HGB BLD-MCNC: 10.4 G/DL (ref 13.3–17.7)
IMM GRANULOCYTES # BLD: 0.1 10E3/UL
IMM GRANULOCYTES NFR BLD: 1 %
LACTATE SERPL-SCNC: 0.7 MMOL/L (ref 0.7–2)
LIPASE SERPL-CCNC: 963 U/L (ref 73–393)
LYMPHOCYTES # BLD AUTO: 1.7 10E3/UL (ref 0.8–5.3)
LYMPHOCYTES NFR BLD AUTO: 11 %
MCH RBC QN AUTO: 30.7 PG (ref 26.5–33)
MCHC RBC AUTO-ENTMCNC: 31.3 G/DL (ref 31.5–36.5)
MCV RBC AUTO: 98 FL (ref 78–100)
MONOCYTES # BLD AUTO: 1.2 10E3/UL (ref 0–1.3)
MONOCYTES NFR BLD AUTO: 7 %
NEUTROPHILS # BLD AUTO: 13.3 10E3/UL (ref 1.6–8.3)
NEUTROPHILS NFR BLD AUTO: 81 %
NRBC # BLD AUTO: 0 10E3/UL
NRBC BLD AUTO-RTO: 0 /100
PLATELET # BLD AUTO: 149 10E3/UL (ref 150–450)
POTASSIUM BLD-SCNC: 3.9 MMOL/L (ref 3.4–5.3)
PROT SERPL-MCNC: 6.7 G/DL (ref 6.8–8.8)
RBC # BLD AUTO: 3.39 10E6/UL (ref 4.4–5.9)
SODIUM SERPL-SCNC: 139 MMOL/L (ref 133–144)
WBC # BLD AUTO: 16.4 10E3/UL (ref 4–11)

## 2022-07-12 PROCEDURE — 83690 ASSAY OF LIPASE: CPT | Performed by: PHYSICIAN ASSISTANT

## 2022-07-12 PROCEDURE — 250N000013 HC RX MED GY IP 250 OP 250 PS 637: Performed by: PHYSICIAN ASSISTANT

## 2022-07-12 PROCEDURE — 120N000001 HC R&B MED SURG/OB

## 2022-07-12 PROCEDURE — 250N000011 HC RX IP 250 OP 636: Performed by: PHYSICIAN ASSISTANT

## 2022-07-12 PROCEDURE — 99232 SBSQ HOSP IP/OBS MODERATE 35: CPT | Performed by: HOSPITALIST

## 2022-07-12 PROCEDURE — 85025 COMPLETE CBC W/AUTO DIFF WBC: CPT | Performed by: PHYSICIAN ASSISTANT

## 2022-07-12 PROCEDURE — 83605 ASSAY OF LACTIC ACID: CPT | Performed by: PHYSICIAN ASSISTANT

## 2022-07-12 PROCEDURE — 80053 COMPREHEN METABOLIC PANEL: CPT | Performed by: PHYSICIAN ASSISTANT

## 2022-07-12 PROCEDURE — 36415 COLL VENOUS BLD VENIPUNCTURE: CPT | Performed by: PHYSICIAN ASSISTANT

## 2022-07-12 PROCEDURE — 258N000003 HC RX IP 258 OP 636: Performed by: PHYSICIAN ASSISTANT

## 2022-07-12 RX ADMIN — SODIUM CHLORIDE, POTASSIUM CHLORIDE, SODIUM LACTATE AND CALCIUM CHLORIDE: 600; 310; 30; 20 INJECTION, SOLUTION INTRAVENOUS at 12:36

## 2022-07-12 RX ADMIN — LOSARTAN POTASSIUM 50 MG: 50 TABLET, FILM COATED ORAL at 08:16

## 2022-07-12 RX ADMIN — TAZOBACTAM SODIUM AND PIPERACILLIN SODIUM 3.38 G: 375; 3 INJECTION, SOLUTION INTRAVENOUS at 23:12

## 2022-07-12 RX ADMIN — HYDROMORPHONE HYDROCHLORIDE 0.5 MG: 1 INJECTION, SOLUTION INTRAMUSCULAR; INTRAVENOUS; SUBCUTANEOUS at 03:52

## 2022-07-12 RX ADMIN — OXYCODONE HYDROCHLORIDE 5 MG: 5 TABLET ORAL at 20:40

## 2022-07-12 RX ADMIN — ATENOLOL 100 MG: 100 TABLET ORAL at 08:16

## 2022-07-12 RX ADMIN — SODIUM CHLORIDE, POTASSIUM CHLORIDE, SODIUM LACTATE AND CALCIUM CHLORIDE: 600; 310; 30; 20 INJECTION, SOLUTION INTRAVENOUS at 19:46

## 2022-07-12 RX ADMIN — TAZOBACTAM SODIUM AND PIPERACILLIN SODIUM 3.38 G: 375; 3 INJECTION, SOLUTION INTRAVENOUS at 03:46

## 2022-07-12 RX ADMIN — OXYCODONE HYDROCHLORIDE 5 MG: 5 TABLET ORAL at 15:29

## 2022-07-12 RX ADMIN — ENOXAPARIN SODIUM 40 MG: 100 INJECTION SUBCUTANEOUS at 20:40

## 2022-07-12 RX ADMIN — FAMOTIDINE 40 MG: 20 TABLET, FILM COATED ORAL at 08:16

## 2022-07-12 RX ADMIN — TAZOBACTAM SODIUM AND PIPERACILLIN SODIUM 3.38 G: 375; 3 INJECTION, SOLUTION INTRAVENOUS at 10:00

## 2022-07-12 RX ADMIN — TAZOBACTAM SODIUM AND PIPERACILLIN SODIUM 3.38 G: 375; 3 INJECTION, SOLUTION INTRAVENOUS at 16:02

## 2022-07-12 ASSESSMENT — ACTIVITIES OF DAILY LIVING (ADL)
ADLS_ACUITY_SCORE: 20

## 2022-07-12 NOTE — PROGRESS NOTES
"[facility] ADMISSION NOTE    Patient admitted to room 2311 at approximately 1943 via ambulation from emergency room. Patient was accompanied by transport tech.     Verbal SBAR report received from ER, RN   prior to patient arrival.     Patient ambulated to bed with one assist. Patient alert and oriented X 3. Pain is controlled with current analgesics.  Medication(s) being used: acetaminophen and narcotic analgesics including hydromorphone (Dilaudid).  . Admission vital signs: Blood pressure (!) 147/84, pulse 104, temperature 98.1  F (36.7  C), temperature source Oral, resp. rate 20, height 1.676 m (5' 6\"), weight 72.1 kg (159 lb), SpO2 96 %. Patient was oriented to plan of care, call light, bed controls, tv, telephone, bathroom and visiting hours.     Risk Assessment    The following safety risks were identified during admission: fall. Yellow risk band applied: YES.     Skin Initial Assessment    This writer admitted this patient and completed a full skin assessment and Senthil score in the Adult PCS flowsheet. Appropriate interventions initiated as needed.     Secondary skin check completed by LEELA Steward.    Senthil Risk Assessment  Sensory Perception: 4-->no impairment  Moisture: 4-->rarely moist  Activity: 3-->walks occasionally  Mobility: 3-->slightly limited  Nutrition: 3-->adequate  Friction and Shear: 3-->no apparent problem  Senthil Score: 20  Mattress: Standard Hospital Mattress (Foam)  Bed Frame: Standard width and length    Education    Patient has a [place]as to Observation order: Yes  Observation education completed and documented: N/A      Lisa Ortiz RN    "

## 2022-07-12 NOTE — PROGRESS NOTES
DATE:  7/11/2022   TIME OF RECEIPT FROM LAB:  2026  LAB TEST:  Lactic acid  LAB VALUE:  2.5  RESULTS GIVEN WITH READ-BACK TO (PROVIDER):  Bronson Wolff MD  TIME LAB VALUE REPORTED TO PROVIDER:   2030    Lisa Ortiz RN on 7/11/2022 at 8:31 PM

## 2022-07-12 NOTE — PROGRESS NOTES
Satting 100% on 2l O2,,  baseline is room air. Will start to wean off this am and monitor for changes. Pt states he feels pretty good compared to yesterday, No nausea. Up and ambulating to bathroom. Satting at 95% on room air P 66. States he feels fine

## 2022-07-12 NOTE — PROGRESS NOTES
Skin affirmation note     Admitting nurse completed full skin assessment, Senthil score and Senthil interventions. This writer agrees with the initial skin assessment findings.

## 2022-07-13 VITALS
HEART RATE: 74 BPM | BODY MASS INDEX: 25.55 KG/M2 | DIASTOLIC BLOOD PRESSURE: 68 MMHG | TEMPERATURE: 98.4 F | HEIGHT: 66 IN | OXYGEN SATURATION: 96 % | RESPIRATION RATE: 18 BRPM | SYSTOLIC BLOOD PRESSURE: 135 MMHG | WEIGHT: 159 LBS

## 2022-07-13 LAB
ALBUMIN SERPL-MCNC: 2.7 G/DL (ref 3.4–5)
ALP SERPL-CCNC: 168 U/L (ref 40–150)
ALT SERPL W P-5'-P-CCNC: 126 U/L (ref 0–70)
ANION GAP SERPL CALCULATED.3IONS-SCNC: 6 MMOL/L (ref 3–14)
AST SERPL W P-5'-P-CCNC: 33 U/L (ref 0–45)
BASOPHILS # BLD AUTO: 0 10E3/UL (ref 0–0.2)
BASOPHILS NFR BLD AUTO: 0 %
BILIRUB SERPL-MCNC: 1.3 MG/DL (ref 0.2–1.3)
BUN SERPL-MCNC: 10 MG/DL (ref 7–30)
CALCIUM SERPL-MCNC: 8.8 MG/DL (ref 8.5–10.1)
CHLORIDE BLD-SCNC: 106 MMOL/L (ref 94–109)
CO2 SERPL-SCNC: 27 MMOL/L (ref 20–32)
CREAT SERPL-MCNC: 0.88 MG/DL (ref 0.66–1.25)
EOSINOPHIL # BLD AUTO: 0.2 10E3/UL (ref 0–0.7)
EOSINOPHIL NFR BLD AUTO: 1 %
ERYTHROCYTE [DISTWIDTH] IN BLOOD BY AUTOMATED COUNT: 13.7 % (ref 10–15)
GFR SERPL CREATININE-BSD FRML MDRD: 90 ML/MIN/1.73M2
GLUCOSE BLD-MCNC: 87 MG/DL (ref 70–99)
HCT VFR BLD AUTO: 33.4 % (ref 40–53)
HGB BLD-MCNC: 10.9 G/DL (ref 13.3–17.7)
IMM GRANULOCYTES # BLD: 0.1 10E3/UL
IMM GRANULOCYTES NFR BLD: 1 %
LIPASE SERPL-CCNC: 497 U/L (ref 73–393)
LYMPHOCYTES # BLD AUTO: 1.5 10E3/UL (ref 0.8–5.3)
LYMPHOCYTES NFR BLD AUTO: 11 %
MCH RBC QN AUTO: 31.5 PG (ref 26.5–33)
MCHC RBC AUTO-ENTMCNC: 32.6 G/DL (ref 31.5–36.5)
MCV RBC AUTO: 97 FL (ref 78–100)
MONOCYTES # BLD AUTO: 1.3 10E3/UL (ref 0–1.3)
MONOCYTES NFR BLD AUTO: 10 %
NEUTROPHILS # BLD AUTO: 10.3 10E3/UL (ref 1.6–8.3)
NEUTROPHILS NFR BLD AUTO: 77 %
NRBC # BLD AUTO: 0 10E3/UL
NRBC BLD AUTO-RTO: 0 /100
PLATELET # BLD AUTO: 186 10E3/UL (ref 150–450)
POTASSIUM BLD-SCNC: 3.6 MMOL/L (ref 3.4–5.3)
PROT SERPL-MCNC: 7.2 G/DL (ref 6.8–8.8)
RBC # BLD AUTO: 3.46 10E6/UL (ref 4.4–5.9)
SODIUM SERPL-SCNC: 139 MMOL/L (ref 133–144)
WBC # BLD AUTO: 13.4 10E3/UL (ref 4–11)

## 2022-07-13 PROCEDURE — 999N000156 HC STATISTIC RCP CONSULT EA 30 MIN

## 2022-07-13 PROCEDURE — 99239 HOSP IP/OBS DSCHRG MGMT >30: CPT | Performed by: HOSPITALIST

## 2022-07-13 PROCEDURE — 85014 HEMATOCRIT: CPT | Performed by: HOSPITALIST

## 2022-07-13 PROCEDURE — 250N000011 HC RX IP 250 OP 636: Performed by: PHYSICIAN ASSISTANT

## 2022-07-13 PROCEDURE — 250N000013 HC RX MED GY IP 250 OP 250 PS 637: Performed by: PHYSICIAN ASSISTANT

## 2022-07-13 PROCEDURE — 258N000003 HC RX IP 258 OP 636: Performed by: PHYSICIAN ASSISTANT

## 2022-07-13 PROCEDURE — 83690 ASSAY OF LIPASE: CPT | Performed by: HOSPITALIST

## 2022-07-13 PROCEDURE — 36415 COLL VENOUS BLD VENIPUNCTURE: CPT | Performed by: HOSPITALIST

## 2022-07-13 PROCEDURE — 84450 TRANSFERASE (AST) (SGOT): CPT | Performed by: HOSPITALIST

## 2022-07-13 RX ADMIN — OXYCODONE HYDROCHLORIDE 5 MG: 5 TABLET ORAL at 12:58

## 2022-07-13 RX ADMIN — LOSARTAN POTASSIUM 50 MG: 50 TABLET, FILM COATED ORAL at 07:34

## 2022-07-13 RX ADMIN — TAZOBACTAM SODIUM AND PIPERACILLIN SODIUM 3.38 G: 375; 3 INJECTION, SOLUTION INTRAVENOUS at 16:10

## 2022-07-13 RX ADMIN — FAMOTIDINE 40 MG: 20 TABLET, FILM COATED ORAL at 07:34

## 2022-07-13 RX ADMIN — TAZOBACTAM SODIUM AND PIPERACILLIN SODIUM 3.38 G: 375; 3 INJECTION, SOLUTION INTRAVENOUS at 04:46

## 2022-07-13 RX ADMIN — TAZOBACTAM SODIUM AND PIPERACILLIN SODIUM 3.38 G: 375; 3 INJECTION, SOLUTION INTRAVENOUS at 10:48

## 2022-07-13 RX ADMIN — SODIUM CHLORIDE, POTASSIUM CHLORIDE, SODIUM LACTATE AND CALCIUM CHLORIDE: 600; 310; 30; 20 INJECTION, SOLUTION INTRAVENOUS at 10:03

## 2022-07-13 RX ADMIN — OXYCODONE HYDROCHLORIDE 5 MG: 5 TABLET ORAL at 01:01

## 2022-07-13 RX ADMIN — ATENOLOL 100 MG: 100 TABLET ORAL at 07:34

## 2022-07-13 RX ADMIN — SODIUM CHLORIDE, POTASSIUM CHLORIDE, SODIUM LACTATE AND CALCIUM CHLORIDE: 600; 310; 30; 20 INJECTION, SOLUTION INTRAVENOUS at 02:45

## 2022-07-13 RX ADMIN — OXYCODONE HYDROCHLORIDE 5 MG: 5 TABLET ORAL at 07:34

## 2022-07-13 ASSESSMENT — ACTIVITIES OF DAILY LIVING (ADL)
ADLS_ACUITY_SCORE: 20

## 2022-07-13 NOTE — DISCHARGE SUMMARY
"[facility]  Hospitalist Discharge Summary      Date of Admission:  7/10/2022  Date of Discharge:  7/13/2022  Discharging Provider: Bharat Ramirez MD  Discharge Service: Hospitalist Service    Discharge Diagnoses   Acute pancreatitis possibly due to passing biliary stone causing transient biliary obstruction  Elevated liver enzymes and bilirubin    Follow-ups Needed After Discharge   Follow-up Appointments     Follow-up and recommended labs and tests       Follow up with primary care provider, Amy Chandler, within 7 days   for hospital follow- up.  The following labs/tests are recommended: CMP,   Lipase, CBC.             Unresulted Labs Ordered in the Past 30 Days of this Admission     No orders found from 6/10/2022 to 7/11/2022.          Discharge Disposition   Discharged to home  Condition at discharge: Stable    Hospital Course         Willian Reid is a 72 year old male admitted on 7/10/2022. He presented to the emergency department for evaluation of abdominal pain, back pain, and retching and was found to have acute pancreatitis likely due to biliary obstruction now resolving for which he is being admitted for further evaluation and treatment.     Acute pancreatitis, likely due to biliary obstruction - resolving  Elevated liver enzymes and total bilirubin   History of cholecystectomy on 6/9/22  Recent history of cholecystectomy on 6/9/22. Presented with recurring abdominal pain and lipase 61216 / total bilirubin 4.3 / alk phos 294 /  / . CT abdomen & pelvis 7/10/22 demonstrates \"uncomplicated acute pancreatitis.\" MRCP 7/11/22 demonstrates \"acute pancreatitis without complications, not significantly changed since CT yesterday [7/10/22]. No biliary ductal dilatation or choledocholithiasis.\" Triglycerides within normal limits (79).   Cause of acute pancreatitis appears to be due to biliary obstruction either from stone that has since passed, or sludge. "   Initially tried to transfer patient from emergency department to outside facility able to do ERCP, but no beds available. MRCP then pursued with results noted above, as well as trend toward improvement of all labs. Admission plan was re-visited after discussion with general surgery and it was determined patient may not need an ERCP and could be admitted at [facility].  Lipase 03957  Total bilirubin 4.3 ? 1.6   Alk phos 294 ?  255 ?  199    ?  300 ?  179   ? 124 ?  57  - Admited to [facility] with close monitoring of labs; with plan of if worsening (specifically bilirubin),  transferring for ERCP  - General Surgery consult was not necessary, but they are aware of patient and could be consulted if necessary  - Analgesia: prn oxycodone 2.5-5 mg q4h prn and IV hydromorphone 0.2 - 0.5 mg q2h  - Daily CMP, lipase   - LR 1L bolus, followed by maintenance @ 125 ml/hr    Leukocytosis with left shift  Admit WBC 21.3, ANC 18.0. Afebrile. Source is likely secondary to pancreatitis.   Leukocytosis / ANC trended toward improvement.   - Zosyn 3.375 mg q6h  -WBC count is improved, 16.4     Elevated lactic acid  - resolved  Patient triggered sepsis BPA, lactic elevated at 2.5. Normalized to 1.3 after 1L LR bolus.  - Resolved    Essential hypertension  Managed prior to admission with atenolol 100 mg daily and losartan 50 mg daily.   - Continued home medications with holding parameters    Gastroesophageal reflux disease, unspecified whether esophagitis present  Managed prior to admission with Pepcid 40 mg daily, continue.     Bronchitis with bronchospasm  Occurs occasionally, mostly after mowing the lawn. No current symptoms at time of admission. Uses prn albuterol inhaler.  - Albuterol nebs was used as needed    Consultations This Hospital Stay   None    Code Status   Full Code    Time Spent on this Encounter   Bharat YIN MD, personally saw the patient today and spent greater than 30 minutes  discharging this patient.       Bharat Ramirez MD  [facility] SURGICAL  [address] [facility] [address] [address]  Phone: [phone]  Fax: [phone]  ______________________________________________________________________    Physical Exam   Vital Signs: Temp: 98.4  F (36.9  C) Temp src: Oral BP: 135/68 Pulse: 74   Resp: 18 SpO2: 96 % O2 Device: None (Room air)    Weight: 159 lbs 0 oz    Constitutional: Alert, oriented, cooperative. No apparent distress, appears nontoxic. Speaking in full coherent sentences.   Eyes: Eyes are clear, pupils are reactive. No scleral icterus.   HEENT: Oropharynx is clear and moist, no lesions. Normocephalic, no evidence of cranial trauma.    Cardiovascular: Regular rhythm and rate, normal S1 and S2. No murmur, rubs, or gallops. Peripheral pulses intact bilaterally. No lower extremity edema.  Respiratory: Lung sounds are clear to auscultation bilaterally without wheezes, rhonchi, or crackles.  GI: Tympanic to percussion in all quadrants. Firm, slightly distended. Minimally tender to palpation of all quadrants, no rebound or guarding. No hepatosplenomegaly or masses appreciated.   Normal bowel sounds.   Musculoskeletal: Without obvious deformity, normal range of motion. Normal muscle bulk and tone. Distal CMS intact.    Skin: Warm and dry, no rashes or ecchymoses. No mottling of skin.   Neurologic: Patient moves all extremities. Gross strength and sensation are equal bilaterally.  Genitourinary: Deferred        Primary Care Physician   Amy Chandler    Discharge Orders      Reason for your hospital stay    Acute pancreatitis possibly due to passing biliary stone causing transient biliary obstruction     Follow-up and recommended labs and tests     Follow up with primary care provider, Amy Chandler, within 7 days for hospital follow- up.  The following labs/tests are recommended: CMP, Lipase, CBC.     Activity    Your activity upon discharge:  activity as tolerated     Diet    Follow this diet upon discharge: Orders Placed This Encounter       Regular Diet Adult       Significant Results and Procedures   Most Recent 3 CBC's:Recent Labs   Lab Test 07/13/22  0832 07/12/22  0452 07/11/22  0626   WBC 13.4* 16.4* 12.2*   HGB 10.9* 10.4* 11.8*   MCV 97 98 98    149* 171     Most Recent 3 BMP's:Recent Labs   Lab Test 07/13/22  0832 07/12/22  0452 07/11/22  0626    139 141   POTASSIUM 3.6 3.9 4.1   CHLORIDE 106 109 110*   CO2 27 26 26   BUN 10 15 16   CR 0.88 0.91 0.94   ANIONGAP 6 4 5   ROSSY 8.8 8.6 8.7   GLC 87 95 82     Most Recent 2 LFT's:Recent Labs   Lab Test 07/13/22  0832 07/12/22  0452   AST 33 57*   * 179*   ALKPHOS 168* 199*   BILITOTAL 1.3 1.6*     Ref Range & Units  8:32 AM 1 d ago 2 d ago 3 d ago 1 mo ago 12 yr ago   Lipase 73 - 393 U/L 497 High   963 High   4,534 High   29,919 High   92        Discharge Medications   Current Discharge Medication List      CONTINUE these medications which have NOT CHANGED    Details   albuterol (PROAIR HFA/PROVENTIL HFA/VENTOLIN HFA) 108 (90 Base) MCG/ACT inhaler Inhale 2 puffs into the lungs every 4 hours as needed for shortness of breath / dyspnea or wheezing  Qty: 18 g, Refills: 11    Comments: Pharmacy may dispense brand covered by insurance (Proair, or proventil or ventolin or generic albuterol inhaler)  Associated Diagnoses: Seasonal allergic rhinitis, unspecified trigger      atenolol (TENORMIN) 100 MG tablet Take 1 tablet (100 mg) by mouth daily  Qty: 90 tablet, Refills: 3    Associated Diagnoses: Essential hypertension      famotidine (PEPCID) 40 MG tablet Take 1 tablet (40 mg) by mouth daily  Qty: 90 tablet, Refills: 3    Associated Diagnoses: Gastroesophageal reflux disease without esophagitis      losartan (COZAAR) 50 MG tablet Take 1 tablet (50 mg) by mouth daily  Qty: 90 tablet, Refills: 3    Associated Diagnoses: Essential hypertension      MULTIVITAMINS OR TABS Take 1 tablet by  mouth daily  Qty: 100, Refills: 3    Associated Diagnoses: Hypertension      Probiotic Product (PRO-BIOTIC BLEND) CAPS Take 1 capsule by mouth daily           Allergies   Allergies   Allergen Reactions     Tetracycline Rash

## 2022-07-13 NOTE — PROGRESS NOTES
Patient alert and oriented, reports overall improvement. Has tolerated clear liquids, denies any nausea. Advanced diet to full liquids for breakfast. Left VM with kitchen.

## 2022-07-13 NOTE — PROGRESS NOTES
Pt tolerated full liquid at breakfast and regular diet at lunch/ Pain has been well managed at a 3 or less. Had a 1 med form boweel movement today. Overall pt states he feels pretty good. No new concerns. Amb Ind in room.

## 2022-07-13 NOTE — PROGRESS NOTES
"[facility]    Medicine Progress Note - Hospitalist Service    Date of Admission:  7/10/2022    Assessment & Plan         Willian Reid is a 72 year old male admitted on 7/10/2022. He presented to the emergency department for evaluation of abdominal pain, back pain, and retching and was found to have acute pancreatitis likely due to biliary obstruction now resolving for which he is being admitted for further evaluation and treatment.     Acute pancreatitis, likely due to biliary obstruction - resolving  Elevated liver enzymes and total bilirubin   History of cholecystectomy on 6/9/22  Recent history of cholecystectomy on 6/9/22. Presented with recurring abdominal pain and lipase 79847 / total bilirubin 4.3 / alk phos 294 /  / . CT abdomen & pelvis 7/10/22 demonstrates \"uncomplicated acute pancreatitis.\" MRCP 7/11/22 demonstrates \"acute pancreatitis without complications, not significantly changed since CT yesterday [7/10/22]. No biliary ductal dilatation or choledocholithiasis.\" Triglycerides within normal limits (79).   Cause of acute pancreatitis appears to be due to biliary obstruction either from stone that has since passed, or sludge.   Initially tried to transfer patient from emergency department to outside facility able to do ERCP, but no beds available. MRCP then pursued with results noted above, as well as trend toward improvement of all labs. Admission plan was re-visited after discussion with general surgery and it was determined patient may not need an ERCP and could be admitted at [facility].  Lipase [id number] Constitutional: Alert, oriented, cooperative. No apparent distress, appears nontoxic. Speaking in full coherent sentences.      Eyes: Eyes are clear, pupils are reactive. No scleral icterus.     HEENT: Oropharynx is clear and moist, no lesions. Normocephalic, no evidence of cranial trauma.       Cardiovascular: Regular rhythm and rate, normal S1 and S2. " No murmur, rubs, or gallops. Peripheral pulses intact bilaterally. No lower extremity edema.     Respiratory: Lung sounds are clear to auscultation bilaterally without wheezes, rhonchi, or crackles.     GI: Tympanic to percussion in all quadrants. Firm, slightly distended. Minimally tender to palpation of all quadrants, no rebound or guarding. No hepatosplenomegaly or masses appreciated. Hypoactive bowel sounds.      Musculoskeletal: Without obvious deformity, normal range of motion. Normal muscle bulk and tone. Distal CMS intact.       Skin: Warm and dry, no rashes or ecchymoses. No mottling of skin.      Neurologic: Patient moves all extremities. Gross strength and sensation are equal bilaterally.     Genitourinary: Deferred   Total bilirubin 4.3 ? 1.6   Alk phos 294 ?  255 ?  199    ?  300 ?  179   ? 124 ?  57  - Admit to [facility] with close monitoring of labs; if worsening (specifically bilirubin), will need transfer for ERCP  - General Surgery consult not necessary, but they are aware of patient and could consult if necessary (although if patient is worsening, consulting GI and higher level of care would be the appropriate next step)  - Analgesia: prn oxycodone 2.5-5 mg q4h prn and IV hydromorphone 0.2 - 0.5 mg q2h  - Daily CMP, lipase in am   - LR 1L bolus, followed by maintenance @ 150 ml/hr    Leukocytosis with left shift  Admit WBC 21.3, ANC 18.0. Afebrile. Source is likely secondary to pancreatitis.   Leukocytosis / ANC trending toward improvement.   - Zosyn 3.375 mg q6h  -WBC count is at improved 16.4 today.    Elevated lactic acid  - resolved  Patient triggered sepsis BPA, lactic elevated at 2.5. Normalized to 1.3 after 1L LR bolus.  - Resolved    Essential hypertension  Managed prior to admission with atenolol 100 mg daily and losartan 50 mg daily.   - Continue home medications with holding parameters    Gastroesophageal reflux disease, unspecified whether esophagitis  "present  Managed prior to admission with Pepcid 40 mg daily, continue.     Bronchitis with bronchospasm  Occurs occasionally, mostly after mowing the lawn. No current symptoms at time of admission. Uses prn albuterol inhaler.  - Albuterol nebs available if needed     Diet: Clear Liquid Diet    DVT Prophylaxis: Lovenox  Rogers Catheter: Not present  Central Lines: None  Cardiac Monitoring: None  Code Status: Full Code      Disposition Plan    Home with family     The patient's care was discussed with the Bedside Nurse, Patient and Patient's Family.    Bharat Ramirez MD  Hospitalist Service  [facility]  Securely message with the Vocera Web Console (learn more here)  Text page via [employer] Paging/Directory         Clinically Significant Risk Factors Present on Admission               # Overweight: Estimated body mass index is 25.66 kg/m  as calculated from the following:    Height as of this encounter: 1.676 m (5' 6\").    Weight as of this encounter: 72.1 kg (159 lb).        ______________________________________________________________________    Interval History   Patient feels better with abdominal pain.  He had bowel movement and passes gas.  He is more comfortable.  He is not confused.  Pain is bearable.  His abdomen is still distended and tender to deep palpation all over without guarding or rebound.  No nausea or vomiting.  He has been tolerating clear liquids.  We will advance diet tomorrow.  Lipase and liver enzymes and bilirubin are decreasing steadily.  If he can tolerate regular food can be discharged home tomorrow.    Data reviewed today: I reviewed all medications, new labs and imaging results over the last 24 hours. I personally reviewed no images or EKG's today.    Physical Exam   Vital Signs: Temp: 97.9  F (36.6  C) Temp src: Oral BP: 133/59 Pulse: 69   Resp: 18 SpO2: 97 % O2 Device: None (Room air) Oxygen Delivery: 2 LPM  Weight: 159 lbs 0 oz  Constitutional: Alert, oriented, " cooperative. No apparent distress, appears nontoxic. Speaking in full coherent sentences.      Eyes: Eyes are clear, pupils are reactive. No scleral icterus.     HEENT: Oropharynx is clear and moist, no lesions. Normocephalic, no evidence of cranial trauma.       Cardiovascular: Regular rhythm and rate, normal S1 and S2. No murmur, rubs, or gallops. Peripheral pulses intact bilaterally. No lower extremity edema.     Respiratory: Lung sounds are clear to auscultation bilaterally without wheezes, rhonchi, or crackles.     GI: Tympanic to percussion in all quadrants. Firm, slightly distended. Minimally tender to palpation of all quadrants, no rebound or guarding. No hepatosplenomegaly or masses appreciated. Hypoactive bowel sounds.      Musculoskeletal: Without obvious deformity, normal range of motion. Normal muscle bulk and tone. Distal CMS intact.       Skin: Warm and dry, no rashes or ecchymoses. No mottling of skin.      Neurologic: Patient moves all extremities. Gross strength and sensation are equal bilaterally.     Genitourinary: Deferred    Data   Recent Labs   Lab 07/12/22  0452 07/11/22  0626 07/10/22  1548 07/10/22  1548 07/10/22  1427   WBC 16.4* 12.2*  --   --  21.3*   HGB 10.4* 11.8*  --   --  13.4   MCV 98 98  --   --  95   * 171  --   --  251    141  --  140  --    POTASSIUM 3.9 4.1  --  5.0  --    CHLORIDE 109 110*  --  110*  --    CO2 26 26  --  27  --    BUN 15 16  --  16  --    CR 0.91 0.94  --  1.08  --    ANIONGAP 4 5  --  3  --    ROSSY 8.6 8.7  --  9.0  --    GLC 95 82  --  100*  --    ALBUMIN 2.5* 2.9*   < > 3.3*  --    PROTTOTAL 6.7* 7.4   < > 8.0  --    BILITOTAL 1.6* 1.6*   < > 4.3*  --    ALKPHOS 199* 255*   < > 294*  --    * 300*   < > 426*  --    AST 57* 124*   < > 246*  --    LIPASE 963* 4,534*   < > 29,919*  --     < > = values in this interval not displayed.

## 2022-07-14 ENCOUNTER — PATIENT OUTREACH (OUTPATIENT)
Dept: CARE COORDINATION | Facility: CLINIC | Age: 72
End: 2022-07-14

## 2022-07-14 DIAGNOSIS — Z71.89 OTHER SPECIFIED COUNSELING: ICD-10-CM

## 2022-07-14 NOTE — PROGRESS NOTES
Clinic Care Coordination Contact  [facility]/Voicemail       Clinical Data: Care Coordinator Outreach  Reason for referral: TCM outreach  Outreach attempted x 1.  Left message on patient's voicemail with main [facility] phone number to reach care team or scheduling department.  Plan:  Care Coordinator will try to reach patient again in 1-2 business days.       Cata Polo  Community Health Worker  Connected Care Resource [place], [facility]  Ph: [phone]

## 2022-07-15 ENCOUNTER — OFFICE VISIT (OUTPATIENT)
Dept: FAMILY MEDICINE | Facility: CLINIC | Age: 72
End: 2022-07-15
Payer: MEDICARE

## 2022-07-15 VITALS
OXYGEN SATURATION: 98 % | DIASTOLIC BLOOD PRESSURE: 86 MMHG | SYSTOLIC BLOOD PRESSURE: 136 MMHG | HEIGHT: 66 IN | BODY MASS INDEX: 26.68 KG/M2 | TEMPERATURE: 97.7 F | WEIGHT: 166 LBS | HEART RATE: 68 BPM | RESPIRATION RATE: 18 BRPM

## 2022-07-15 DIAGNOSIS — Z90.49 S/P CHOLECYSTECTOMY: ICD-10-CM

## 2022-07-15 DIAGNOSIS — R19.5 POSITIVE COLORECTAL CANCER SCREENING USING COLOGUARD TEST: ICD-10-CM

## 2022-07-15 DIAGNOSIS — R74.8 ELEVATED LIVER ENZYMES: ICD-10-CM

## 2022-07-15 DIAGNOSIS — M79.89 LEG SWELLING: ICD-10-CM

## 2022-07-15 DIAGNOSIS — Z12.11 SCREEN FOR COLON CANCER: ICD-10-CM

## 2022-07-15 DIAGNOSIS — K85.90 ACUTE PANCREATITIS, UNSPECIFIED COMPLICATION STATUS, UNSPECIFIED PANCREATITIS TYPE: Primary | ICD-10-CM

## 2022-07-15 LAB
ALBUMIN SERPL-MCNC: 2.8 G/DL (ref 3.4–5)
ALP SERPL-CCNC: 177 U/L (ref 40–150)
ALT SERPL W P-5'-P-CCNC: 84 U/L (ref 0–70)
ANION GAP SERPL CALCULATED.3IONS-SCNC: 4 MMOL/L (ref 3–14)
AST SERPL W P-5'-P-CCNC: 28 U/L (ref 0–45)
BASOPHILS # BLD AUTO: 0 10E3/UL (ref 0–0.2)
BASOPHILS NFR BLD AUTO: 0 %
BILIRUB SERPL-MCNC: 0.8 MG/DL (ref 0.2–1.3)
BUN SERPL-MCNC: 11 MG/DL (ref 7–30)
CALCIUM SERPL-MCNC: 9.1 MG/DL (ref 8.5–10.1)
CHLORIDE BLD-SCNC: 108 MMOL/L (ref 94–109)
CO2 SERPL-SCNC: 26 MMOL/L (ref 20–32)
CREAT SERPL-MCNC: 0.75 MG/DL (ref 0.66–1.25)
EOSINOPHIL # BLD AUTO: 0.2 10E3/UL (ref 0–0.7)
EOSINOPHIL NFR BLD AUTO: 2 %
ERYTHROCYTE [DISTWIDTH] IN BLOOD BY AUTOMATED COUNT: 13.1 % (ref 10–15)
GFR SERPL CREATININE-BSD FRML MDRD: >90 ML/MIN/1.73M2
GLUCOSE BLD-MCNC: 87 MG/DL (ref 70–99)
HCT VFR BLD AUTO: 32.9 % (ref 40–53)
HGB BLD-MCNC: 10.7 G/DL (ref 13.3–17.7)
LIPASE SERPL-CCNC: 416 U/L (ref 73–393)
LYMPHOCYTES # BLD AUTO: 2.1 10E3/UL (ref 0.8–5.3)
LYMPHOCYTES NFR BLD AUTO: 14 %
MCH RBC QN AUTO: 31.5 PG (ref 26.5–33)
MCHC RBC AUTO-ENTMCNC: 32.5 G/DL (ref 31.5–36.5)
MCV RBC AUTO: 97 FL (ref 78–100)
MONOCYTES # BLD AUTO: 1.3 10E3/UL (ref 0–1.3)
MONOCYTES NFR BLD AUTO: 9 %
NEUTROPHILS # BLD AUTO: 11.7 10E3/UL (ref 1.6–8.3)
NEUTROPHILS NFR BLD AUTO: 76 %
PLATELET # BLD AUTO: 269 10E3/UL (ref 150–450)
POTASSIUM BLD-SCNC: 4.2 MMOL/L (ref 3.4–5.3)
PROT SERPL-MCNC: 8.2 G/DL (ref 6.8–8.8)
RBC # BLD AUTO: 3.4 10E6/UL (ref 4.4–5.9)
SODIUM SERPL-SCNC: 138 MMOL/L (ref 133–144)
WBC # BLD AUTO: 15.4 10E3/UL (ref 4–11)

## 2022-07-15 PROCEDURE — 80053 COMPREHEN METABOLIC PANEL: CPT | Performed by: FAMILY MEDICINE

## 2022-07-15 PROCEDURE — 85025 COMPLETE CBC W/AUTO DIFF WBC: CPT | Performed by: FAMILY MEDICINE

## 2022-07-15 PROCEDURE — 99495 TRANSJ CARE MGMT MOD F2F 14D: CPT | Performed by: FAMILY MEDICINE

## 2022-07-15 PROCEDURE — 36415 COLL VENOUS BLD VENIPUNCTURE: CPT | Performed by: FAMILY MEDICINE

## 2022-07-15 PROCEDURE — 83690 ASSAY OF LIPASE: CPT | Performed by: FAMILY MEDICINE

## 2022-07-15 ASSESSMENT — PAIN SCALES - GENERAL: PAINLEVEL: NO PAIN (0)

## 2022-07-15 NOTE — PROGRESS NOTES
[facility] Resource [place]    Background: Care Coordination referral placed from [facility] discharge report for reason of patient meeting criteria for a TCM outreach call by [facility] Care Resource Center team.    Assessment: Upon chart review, CCRC Team member will cancel/close the referral for TCM outreach due to reason below:    Patient has a follow up appointment with an appropriate provider today for hospital discharge    Plan: Care Coordination referral for TCM outreach canceled.      Cata Polo  Community Health Worker  [facility]  Ph: [phone]     *Connected Care Resource Team does NOT follow patient ongoing. Referrals are identified based on internal discharge reports and the outreach is to ensure patient has an understanding of their discharge instructions.

## 2022-07-15 NOTE — PROGRESS NOTES
Assessment & Plan     Acute pancreatitis, unspecified complication status, unspecified pancreatitis type  Overall improving daily.  He is already tolerating oral intake with no discomfort at all.  He reports being slightly fatigued which I stated is to be as expected with his recent hospitalization.  His liver enzymes have been improving and also lipase.  We will recheck labs today.  - CBC with platelets and differential  - Comprehensive metabolic panel (BMP + Alb, Alk Phos, ALT, AST, Total. Bili, TP)  - Lipase    S/P cholecystectomy  Elevated liver enzymes  -- recheck today.   - Comprehensive metabolic panel (BMP + Alb, Alk Phos, ALT, AST, Total. Bili, TP)    Leg swelling  Has some pitting edema in the lower extremities.  Also weight is up from hospitalization.  This is likely related to IV fluids.  Discussed leg compression, elevation, exercise as tolerated.  Since his symptoms are improving we will continue to monitor.  Denies any chest pain or shortness of breath.    Screen for colon cancer  - hoccerUARD([employer])    The risks, benefits and treatment options of prescribed medications or other treatments have been discussed with the patient. The patient verbalized their understanding and should call or follow up if no improvement or if they develop further problems.      Mt Hernandez,   [facility] PERCY Guerrero is a 72 year old accompanied by his spouse, presenting for the following health issues:  Hospital F/U and Health Maintenance ([facility]      HPI   Chief Complaint   Patient presents with     Hospital F/U     Health Maintenance     Agreed to [facility]         Hospital Follow-up Visit:    Hospital/Nursing Home/IP Rehab Facility: [facility]  Date of Admission: 7/10/2022  Date of Discharge: 7/13/2022  Reason(s) for Admission: acute pancreatitis      Was your hospitalization related to COVID-19? No   Problems taking medications  "regularly:  None  Medication changes since discharge: None  Problems adhering to non-medication therapy:  None    Summary of hospitalization:  [facility] discharge summary reviewed  Diagnostic Tests/Treatments reviewed.  Follow up needed: CBC, CMP, Lipase   Other Healthcare Providers Involved in Patient s Care:         None  Update since discharge: stable.  Additional issues: Patient has not had a appetite, he has bloating, fluid has caused increase weight gain, Patient has shortness of breath and feels like \"I got hit by a bus\"         Post Discharge Medication Reconciliation: discharge medications reconciled, continue medications without change.  Plan of care communicated with patient and family                72-year-old male who was recently hospitalized From 7/10 through 7/13 diagnosed with acute pancreatitis due to likely passing a biliary stone/biliary sludge.      Reports pain has been improving.   Reports doing better than yesterday.   Appetite is still decreased but improving.   Reports eating scrambled eggs, sausage, toast this morning without pain.     No fevers.   Bowels back to baseline.    Reports having some increased leg swelling but this is also improving.       Review of Systems   Constitutional, HEENT, cardiovascular, pulmonary, gi and gu systems are negative, except as otherwise noted.      Objective    /86   Pulse 68   Temp 97.7  F (36.5  C) (Tympanic)   Resp 18   Ht 1.676 m (5' 6\")   Wt 75.3 kg (166 lb)   SpO2 98%   BMI 26.79 kg/m    Body mass index is 26.79 kg/m .  Physical Exam   General: alert, cooperative, no acute distress   CV: RRR, no murmur  Resp: non-labored breathing, clear to auscultation, no wheezing or rales   Abdomen: Soft, non-tender, no guarding.   Extremities: 2+ pitting edema.         .  ..  "

## 2022-07-15 NOTE — PATIENT INSTRUCTIONS
Continue with current cares. Elevate legs as possible.     Lab work today.     Cologuard for colon cancer screening.         Health Maintenance reviewed and plan for update discussed.  Complete cologuard

## 2022-08-02 ENCOUNTER — MYC MEDICAL ADVICE (OUTPATIENT)
Dept: FAMILY MEDICINE | Facility: CLINIC | Age: 72
End: 2022-08-02

## 2022-08-02 PROBLEM — R19.5 POSITIVE COLORECTAL CANCER SCREENING USING COLOGUARD TEST: Status: ACTIVE | Noted: 2022-08-02

## 2022-08-02 LAB — NONINV COLON CA DNA+OCC BLD SCRN STL QL: POSITIVE

## 2022-08-16 ENCOUNTER — OFFICE VISIT (OUTPATIENT)
Dept: FAMILY MEDICINE | Facility: CLINIC | Age: 72
End: 2022-08-16
Payer: MEDICARE

## 2022-08-16 VITALS
BODY MASS INDEX: 24.75 KG/M2 | DIASTOLIC BLOOD PRESSURE: 73 MMHG | WEIGHT: 154 LBS | TEMPERATURE: 97.7 F | SYSTOLIC BLOOD PRESSURE: 116 MMHG | RESPIRATION RATE: 16 BRPM | HEART RATE: 55 BPM | OXYGEN SATURATION: 98 % | HEIGHT: 66 IN

## 2022-08-16 DIAGNOSIS — K85.10 ACUTE BILIARY PANCREATITIS WITHOUT INFECTION OR NECROSIS: Primary | ICD-10-CM

## 2022-08-16 DIAGNOSIS — D62 ANEMIA DUE TO BLOOD LOSS, ACUTE: Primary | ICD-10-CM

## 2022-08-16 DIAGNOSIS — R19.5 POSITIVE COLORECTAL CANCER SCREENING USING COLOGUARD TEST: ICD-10-CM

## 2022-08-16 DIAGNOSIS — I10 ESSENTIAL HYPERTENSION: ICD-10-CM

## 2022-08-16 LAB
ALBUMIN SERPL-MCNC: 3.5 G/DL (ref 3.4–5)
ALP SERPL-CCNC: 101 U/L (ref 40–150)
ALT SERPL W P-5'-P-CCNC: 24 U/L (ref 0–70)
AST SERPL W P-5'-P-CCNC: 21 U/L (ref 0–45)
BILIRUB DIRECT SERPL-MCNC: 0.1 MG/DL (ref 0–0.2)
BILIRUB SERPL-MCNC: 0.4 MG/DL (ref 0.2–1.3)
ERYTHROCYTE [DISTWIDTH] IN BLOOD BY AUTOMATED COUNT: 13.5 % (ref 10–15)
HCT VFR BLD AUTO: 37.1 % (ref 40–53)
HGB BLD-MCNC: 11.7 G/DL (ref 13.3–17.7)
LIPASE SERPL-CCNC: 177 U/L (ref 73–393)
MCH RBC QN AUTO: 30.2 PG (ref 26.5–33)
MCHC RBC AUTO-ENTMCNC: 31.5 G/DL (ref 31.5–36.5)
MCV RBC AUTO: 96 FL (ref 78–100)
PLATELET # BLD AUTO: 296 10E3/UL (ref 150–450)
PROT SERPL-MCNC: 8.7 G/DL (ref 6.8–8.8)
RBC # BLD AUTO: 3.87 10E6/UL (ref 4.4–5.9)
WBC # BLD AUTO: 11.2 10E3/UL (ref 4–11)

## 2022-08-16 PROCEDURE — 83690 ASSAY OF LIPASE: CPT | Performed by: INTERNAL MEDICINE

## 2022-08-16 PROCEDURE — 36415 COLL VENOUS BLD VENIPUNCTURE: CPT | Performed by: INTERNAL MEDICINE

## 2022-08-16 PROCEDURE — 99214 OFFICE O/P EST MOD 30 MIN: CPT | Performed by: INTERNAL MEDICINE

## 2022-08-16 PROCEDURE — 80076 HEPATIC FUNCTION PANEL: CPT | Performed by: INTERNAL MEDICINE

## 2022-08-16 PROCEDURE — 85027 COMPLETE CBC AUTOMATED: CPT | Performed by: INTERNAL MEDICINE

## 2022-08-16 ASSESSMENT — PAIN SCALES - GENERAL: PAINLEVEL: NO PAIN (0)

## 2022-08-16 NOTE — PATIENT INSTRUCTIONS
Hypertension:   Blood pressure is back to normal.  Continue losartan    Positive Cologuard test:  There can be false positives either due to rectal bleeding, or bleeding colon polyp.  Colon cancer will also cause a positive Cologuard test.  For all positive Cologuard test, the recommendation is a colonoscopy    Pancreatitis:  Blood work today

## 2022-08-16 NOTE — RESULT ENCOUNTER NOTE
Pancreas and liver enzymes have normalized    There is still mild anemia and mildly elevated white blood cell count.  This may take more time to normalize.  Recommend repeat in 1 month.  If the abnormalities persist, will discuss next step

## 2022-08-16 NOTE — PROGRESS NOTES
Assessment & Plan   Problem List Items Addressed This Visit     Essential hypertension    Positive colorectal cancer screening using Cologuard test      Other Visit Diagnoses     Acute biliary pancreatitis without infection or necrosis    -  Primary    Relevant Orders    Hepatic panel (Albumin, ALT, AST, Bili, Alk Phos, TP)    Lipase    CBC with platelets                    Patient Instructions   Hypertension:  1.  Blood pressure is back to normal.  Continue losartan    Positive Cologuard test:  1. There can be false positives either due to rectal bleeding, or bleeding colon polyp.  Colon cancer will also cause a positive Cologuard test.  For all positive Cologuard test, the recommendation is a colonoscopy    Pancreatitis:  1. Blood work today      No follow-ups on file.    Amy Chandler, [facility] Gu[facility] is a 72 year old accompanied by his spouse, presenting for the following health issues:  Pancreatitis (Follow up from last month - Hospital follow up last month with Dr. Hernandez, Needs labs today ), Health Maintenance (Will check insurance for shingrix covearage), and Cologuard Results  (Cologuard was positive, patient has colonoscopy scheduled. He is wondering what exactly a positive cologuard test means. )      HPI     Concern - Pancreatitis Follow Up   Onset: 7/10/2022  Description: Was in Hospital for Pancreatitis. Had a follow up in clinic 7/15, here to follow up again , needs labs done .   Intensity: none   Progression of Symptoms:  improving  Accompanying Signs & Symptoms: No current symptoms .  He is feeling much better. Able to eat more things . No pain . Energy level has returned to normal.   Previous history of similar problem: none   Precipitating factors:        Worsened by: none   Alleviating factors:        Improved by: rest, diet   Therapies tried and outcome: rest, diet     Concern -   Cologuard Results  Cologuard was positive, patient has colonoscopy  "scheduled. He is wondering what exactly a positive cologuard test means.   --history of 9 inch of colon removed due to recurrent diverticulitis     Acute pancreatitis possibly due to passing biliary stone causing transient biliary obstruction  Elevated liver enzymes and bilirubin  --Cause of acute pancreatitis appears to be due to biliary obstruction either from stone that has since passed, or sludge.     Hypertension:   --He sent a [email or web address] message on 8/2 \"I just had gallbladder removal and pancreatitis. After being home my BP readings have been 108/52 pulse 58 112/58 pulse 58 118/65 pulse 65 of which I have no energy.   After eliminating the Losartan 50 mg. on my own, I have felt better.  My readings now are 127/65 pulse 65 118/70 pulse 60 128/72 pulse 60.  Is this OK, to eliminate the Losartan 50 mg. until I see you at my next appointment August 16th of this month?\"  -- I instructed him to hold his losartan until her appointment  --he thinks his energy level improved as his blood pressure increased off losartan  --he restarted losartan on 8/12 and still feels normal  --he was retaining a lot of fluid and this has since resolved      Current Outpatient Medications   Medication Sig Dispense Refill     albuterol (PROAIR HFA/PROVENTIL HFA/VENTOLIN HFA) 108 (90 Base) MCG/ACT inhaler Inhale 2 puffs into the lungs every 4 hours as needed for shortness of breath / dyspnea or wheezing 18 g 11     atenolol (TENORMIN) 100 MG tablet Take 1 tablet (100 mg) by mouth daily 90 tablet 3     famotidine (PEPCID) 40 MG tablet Take 1 tablet (40 mg) by mouth daily 90 tablet 3     losartan (COZAAR) 50 MG tablet Take 1 tablet (50 mg) by mouth daily 90 tablet 3     MULTIVITAMINS OR TABS Take 1 tablet by mouth daily 100 3     Probiotic Product (PRO-BIOTIC BLEND) CAPS Take 1 capsule by mouth daily             Review of Systems   Constitutional, HEENT, cardiovascular, pulmonary, gi and gu systems are negative, except as otherwise noted.    " "  Objective    BP (!) 148/86 (BP Location: Right arm, Patient Position: Chair, Cuff Size: Adult Regular)   Pulse 55   Temp 97.7  F (36.5  C) (Tympanic)   Resp 16   Ht 1.676 m (5' 6\")   Wt 69.9 kg (154 lb)   SpO2 98%   BMI 24.86 kg/m    Body mass index is 24.86 kg/m .  Physical Exam   GENERAL APPEARANCE: healthy, alert and no distress  RESP: lungs clear to auscultation - no rales, rhonchi or wheezes  CV: regular rates and rhythm, normal S1 S2, no S3 or S4 and no murmur, click or rub  LYMPHATICS: No leg swelling  ABDOMEN: soft, nontender, without hepatosplenomegaly or masses and bowel sounds normal                    .  ..  "

## 2022-08-24 RX ORDER — NALOXONE HYDROCHLORIDE 0.4 MG/ML
0.4 INJECTION, SOLUTION INTRAMUSCULAR; INTRAVENOUS; SUBCUTANEOUS
Status: CANCELLED | OUTPATIENT
Start: 2022-08-24

## 2022-08-24 RX ORDER — ONDANSETRON 4 MG/1
4 TABLET, ORALLY DISINTEGRATING ORAL EVERY 6 HOURS PRN
Status: CANCELLED | OUTPATIENT
Start: 2022-08-24

## 2022-08-24 RX ORDER — NALOXONE HYDROCHLORIDE 0.4 MG/ML
0.2 INJECTION, SOLUTION INTRAMUSCULAR; INTRAVENOUS; SUBCUTANEOUS
Status: CANCELLED | OUTPATIENT
Start: 2022-08-24

## 2022-08-24 RX ORDER — FLUMAZENIL 0.1 MG/ML
0.2 INJECTION, SOLUTION INTRAVENOUS
Status: CANCELLED | OUTPATIENT
Start: 2022-08-24 | End: 2022-08-25

## 2022-08-24 RX ORDER — PROCHLORPERAZINE MALEATE 5 MG
5 TABLET ORAL EVERY 6 HOURS PRN
Status: CANCELLED | OUTPATIENT
Start: 2022-08-24

## 2022-08-24 RX ORDER — ONDANSETRON 2 MG/ML
4 INJECTION INTRAMUSCULAR; INTRAVENOUS EVERY 6 HOURS PRN
Status: CANCELLED | OUTPATIENT
Start: 2022-08-24

## 2022-08-29 ENCOUNTER — ANESTHESIA EVENT (OUTPATIENT)
Dept: GASTROENTEROLOGY | Facility: CLINIC | Age: 72
End: 2022-08-29
Payer: MEDICARE

## 2022-08-29 RX ORDER — MEPERIDINE HYDROCHLORIDE 25 MG/ML
12.5 INJECTION INTRAMUSCULAR; INTRAVENOUS; SUBCUTANEOUS
Status: CANCELLED | OUTPATIENT
Start: 2022-08-29

## 2022-08-29 RX ORDER — FENTANYL CITRATE 50 UG/ML
25 INJECTION, SOLUTION INTRAMUSCULAR; INTRAVENOUS
Status: CANCELLED | OUTPATIENT
Start: 2022-08-29

## 2022-08-29 RX ORDER — ONDANSETRON 4 MG/1
4 TABLET, ORALLY DISINTEGRATING ORAL EVERY 30 MIN PRN
Status: CANCELLED | OUTPATIENT
Start: 2022-08-29

## 2022-08-29 RX ORDER — OXYCODONE HYDROCHLORIDE 5 MG/1
5 TABLET ORAL EVERY 4 HOURS PRN
Status: CANCELLED | OUTPATIENT
Start: 2022-08-29

## 2022-08-29 RX ORDER — ONDANSETRON 2 MG/ML
4 INJECTION INTRAMUSCULAR; INTRAVENOUS EVERY 30 MIN PRN
Status: CANCELLED | OUTPATIENT
Start: 2022-08-29

## 2022-08-29 RX ORDER — FENTANYL CITRATE 50 UG/ML
25 INJECTION, SOLUTION INTRAMUSCULAR; INTRAVENOUS EVERY 5 MIN PRN
Status: CANCELLED | OUTPATIENT
Start: 2022-08-29

## 2022-08-29 RX ORDER — HYDROMORPHONE HCL IN WATER/PF 6 MG/30 ML
0.2 PATIENT CONTROLLED ANALGESIA SYRINGE INTRAVENOUS EVERY 5 MIN PRN
Status: CANCELLED | OUTPATIENT
Start: 2022-08-29

## 2022-08-29 RX ORDER — SODIUM CHLORIDE, SODIUM LACTATE, POTASSIUM CHLORIDE, CALCIUM CHLORIDE 600; 310; 30; 20 MG/100ML; MG/100ML; MG/100ML; MG/100ML
INJECTION, SOLUTION INTRAVENOUS CONTINUOUS
Status: CANCELLED | OUTPATIENT
Start: 2022-08-29

## 2022-08-29 NOTE — ANESTHESIA PREPROCEDURE EVALUATION
Anesthesia Pre-Procedure Evaluation    Patient: Willian Reid   MRN: [id number] : 1950        Procedure : Procedure(s):  COLONOSCOPY          Past Medical History:   Diagnosis Date     Arthritis      Diverticulitis of colon (without mention of hemorrhage)(562.11) 1980     Hypertension      Uncomplicated asthma       Past Surgical History:   Procedure Laterality Date     ABDOMEN SURGERY       BIOPSY       COLONOSCOPY       FLEXIBLE SIGMOIDOSCOPY  2005     Flexible sigmoidoscopy probably should be changed to a colonoscopy at his next visit to get further and to help him with sedation, so in  he should have a colonoscopy and then every 10 years afterwards     GI SURGERY       HERNIA REPAIR       LAPAROSCOPIC CHOLECYSTECTOMY N/A 2022    Procedure: CHOLECYSTECTOMY, LAPAROSCOPIC;  Surgeon: Joya Bains MD;  Location: [facility] OR     ORTHOPEDIC SURGERY        Allergies   Allergen Reactions     Tetracycline Rash      Social History     Tobacco Use     Smoking status: Former Smoker     Packs/day: 0.50     Years: 4.00     Pack years: 2.00     Types: Cigarettes     Start date: 1967     Quit date: 1971     Years since quittin.6     Smokeless tobacco: Never Used   Substance Use Topics     Alcohol use: No      Wt Readings from Last 1 Encounters:   22 69.9 kg (154 lb)        Anesthesia Evaluation   Pt has had prior anesthetic.         ROS/MED HX  ENT/Pulmonary: Comment:   Bronchitis with bronchospasm in hx    (+) asthma     Neurologic:  - neg neurologic ROS     Cardiovascular:     (+) hypertension-----    METS/Exercise Tolerance: >4 METS    Hematologic:  - neg hematologic  ROS     Musculoskeletal:  - neg musculoskeletal ROS     GI/Hepatic: Comment:   Abdominal pain, bloating  Positive colorectal using cologuard.    (+) GERD, bowel prep, liver disease (elevated liver enzymes in chart),     Renal/Genitourinary:       Endo:  - neg endo ROS     Psychiatric/Substance Use:  - neg  psychiatric ROS     Infectious Disease:       Malignancy:  - neg malignancy ROS     Other:  - neg other ROS          Physical Exam    Airway  airway exam normal           Respiratory Devices and Support         Dental  no notable dental history         Cardiovascular   cardiovascular exam normal       Rhythm and rate: regular and normal     Pulmonary   pulmonary exam normal        breath sounds clear to auscultation           OUTSIDE LABS:  CBC:   Lab Results   Component Value Date    WBC 11.2 (H) 08/16/2022    WBC 15.4 (H) 07/15/2022    HGB 11.7 (L) 08/16/2022    HGB 10.7 (L) 07/15/2022    HCT 37.1 (L) 08/16/2022    HCT 32.9 (L) 07/15/2022     08/16/2022     07/15/2022     BMP:   Lab Results   Component Value Date     07/15/2022     07/13/2022    POTASSIUM 4.2 07/15/2022    POTASSIUM 3.6 07/13/2022    CHLORIDE 108 07/15/2022    CHLORIDE 106 07/13/2022    CO2 26 07/15/2022    CO2 27 07/13/2022    BUN 11 07/15/2022    BUN 10 07/13/2022    CR 0.75 07/15/2022    CR 0.88 07/13/2022    GLC 87 07/15/2022    GLC 87 07/13/2022     COAGS: No results found for: PTT, INR, FIBR  POC: No results found for: BGM, HCG, HCGS  HEPATIC:   Lab Results   Component Value Date    ALBUMIN 3.5 08/16/2022    PROTTOTAL 8.7 08/16/2022    ALT 24 08/16/2022    AST 21 08/16/2022    ALKPHOS 101 08/16/2022    BILITOTAL 0.4 08/16/2022     OTHER:   Lab Results   Component Value Date    LACT 0.7 07/12/2022    A1C 5.7 (H) 11/10/2021    ROSSY 9.1 07/15/2022    PHOS 2.5 06/10/2022    MAG 2.5 (H) 06/10/2022    LIPASE 177 08/16/2022    TSH 0.81 03/02/2009    T4 1.11 03/02/2009       Anesthesia Plan    ASA Status:  3      Anesthesia Type: General.   Induction: Intravenous, Propofol.   Maintenance: TIVA.        Consents    Anesthesia Plan(s) and associated risks, benefits, and realistic alternatives discussed. Questions answered and patient/representative(s) expressed understanding.     - Discussed: Risks, Benefits and Alternatives  for BOTH SEDATION and the PROCEDURE were discussed     - Discussed with:  Patient         Postoperative Care    Pain management: IV analgesics, Oral pain medications, Multi-modal analgesia.   PONV prophylaxis: Ondansetron (or other 5HT-3), Dexamethasone or Solumedrol     Comments:    Other Comments: Patient aware of plan, what to expect, and potential risks. Timeout was performed before bringing the patient back to OR, and once again, patient was asked if they had any questions            CECELIA Mi CRNA

## 2022-08-30 ENCOUNTER — ANESTHESIA (OUTPATIENT)
Dept: GASTROENTEROLOGY | Facility: CLINIC | Age: 72
End: 2022-08-30
Payer: MEDICARE

## 2022-08-30 ENCOUNTER — HOSPITAL ENCOUNTER (OUTPATIENT)
Facility: CLINIC | Age: 72
Discharge: HOME | End: 2022-08-30
Attending: SURGERY | Admitting: SURGERY
Payer: MEDICARE

## 2022-08-30 VITALS
HEIGHT: 66 IN | RESPIRATION RATE: 16 BRPM | TEMPERATURE: 97.4 F | SYSTOLIC BLOOD PRESSURE: 109 MMHG | BODY MASS INDEX: 24.75 KG/M2 | DIASTOLIC BLOOD PRESSURE: 68 MMHG | OXYGEN SATURATION: 98 % | HEART RATE: 49 BPM | WEIGHT: 154 LBS

## 2022-08-30 LAB — COLONOSCOPY: NORMAL

## 2022-08-30 PROCEDURE — 88305 TISSUE EXAM BY PATHOLOGIST: CPT | Mod: TC | Performed by: SURGERY

## 2022-08-30 PROCEDURE — 250N000009 HC RX 250: Performed by: NURSE ANESTHETIST, CERTIFIED REGISTERED

## 2022-08-30 PROCEDURE — 370N000017 HC ANESTHESIA TECHNICAL FEE, PER MIN: Performed by: SURGERY

## 2022-08-30 PROCEDURE — 258N000003 HC RX IP 258 OP 636

## 2022-08-30 PROCEDURE — 45385 COLONOSCOPY W/LESION REMOVAL: CPT | Performed by: SURGERY

## 2022-08-30 PROCEDURE — 250N000009 HC RX 250: Performed by: SURGERY

## 2022-08-30 PROCEDURE — 45385 COLONOSCOPY W/LESION REMOVAL: CPT | Mod: 79 | Performed by: SURGERY

## 2022-08-30 PROCEDURE — 45380 COLONOSCOPY AND BIOPSY: CPT | Mod: 79 | Performed by: SURGERY

## 2022-08-30 PROCEDURE — 250N000011 HC RX IP 250 OP 636: Performed by: NURSE ANESTHETIST, CERTIFIED REGISTERED

## 2022-08-30 RX ORDER — SODIUM CHLORIDE, SODIUM LACTATE, POTASSIUM CHLORIDE, CALCIUM CHLORIDE 600; 310; 30; 20 MG/100ML; MG/100ML; MG/100ML; MG/100ML
INJECTION, SOLUTION INTRAVENOUS CONTINUOUS
Status: DISCONTINUED | OUTPATIENT
Start: 2022-08-30 | End: 2022-08-30 | Stop reason: HOSPADM

## 2022-08-30 RX ORDER — LIDOCAINE 40 MG/G
CREAM TOPICAL
Status: DISCONTINUED | OUTPATIENT
Start: 2022-08-30 | End: 2022-08-30 | Stop reason: HOSPADM

## 2022-08-30 RX ORDER — PROPOFOL 10 MG/ML
INJECTION, EMULSION INTRAVENOUS PRN
Status: DISCONTINUED | OUTPATIENT
Start: 2022-08-30 | End: 2022-08-30

## 2022-08-30 RX ORDER — LIDOCAINE HYDROCHLORIDE 10 MG/ML
INJECTION, SOLUTION EPIDURAL; INFILTRATION; INTRACAUDAL; PERINEURAL PRN
Status: DISCONTINUED | OUTPATIENT
Start: 2022-08-30 | End: 2022-08-30

## 2022-08-30 RX ORDER — PROPOFOL 10 MG/ML
INJECTION, EMULSION INTRAVENOUS CONTINUOUS PRN
Status: DISCONTINUED | OUTPATIENT
Start: 2022-08-30 | End: 2022-08-30

## 2022-08-30 RX ADMIN — LIDOCAINE HYDROCHLORIDE 5 ML: 10 INJECTION, SOLUTION EPIDURAL; INFILTRATION; INTRACAUDAL; PERINEURAL at 12:50

## 2022-08-30 RX ADMIN — LIDOCAINE HYDROCHLORIDE 0.1 ML: 10 INJECTION, SOLUTION EPIDURAL; INFILTRATION; INTRACAUDAL; PERINEURAL at 12:12

## 2022-08-30 RX ADMIN — SODIUM CHLORIDE, POTASSIUM CHLORIDE, SODIUM LACTATE AND CALCIUM CHLORIDE: 600; 310; 30; 20 INJECTION, SOLUTION INTRAVENOUS at 12:12

## 2022-08-30 RX ADMIN — PROPOFOL 50 MG: 10 INJECTION, EMULSION INTRAVENOUS at 12:48

## 2022-08-30 RX ADMIN — PROPOFOL 200 MCG/KG/MIN: 10 INJECTION, EMULSION INTRAVENOUS at 12:50

## 2022-08-30 ASSESSMENT — ACTIVITIES OF DAILY LIVING (ADL): ADLS_ACUITY_SCORE: 35

## 2022-08-30 NOTE — LETTER
[facility]           [address]           [place] [address]           Tel [phone]  Willian Reid  [address] [place] [id number]  September 6, 2022    Dear Willian,  This letter is to inform you of the results of your pathology report on your colonoscopy.  If you have questions please feel free to call:  Please call [phone], for  [facility] or  for [facility], to schedule a follow up appointment in 2 weeks.   Your pathology report was:  Final Diagnosis   A(1).  Colon, polyps x2 at 50 cm, polypectomy:  -Hyperplastic polyps  -Negative for dysplasia or malignancy.        B(2).  Colon, inflamed tissue at 30 cm, biopsies:  -Active colitis with focal erosion, and focal ischemic pattern injury (see comment).  -Negative for dysplasia or malignancy      Electronically signed by Yolis Duval MD PhD on 9/1/2022 at 10:21 AM   Comment  [facility]   The constellation of histologic features raise the differential diagnosis of ischemia, prolapse, segmental colitis associated with diverticular disease, acute self-limited infectious and pseudomembranous colitis , drug/toxin induced injury, among other entities.    As long as you are not having rectal pain or mucus diarrhea or diarrhea this is probably not anything to worry about.  But if you are then I recommend you follow up with a  Gastrointestinal doctor.   You may benefit from seeing a Gastrointestinal Doctor.  There are 2 of them here at [facility].  Dr. Weir and Dr. Quiroz.    You can call  and ask for the  Gastrointestinal clinic.   Or the number below.    [facility]: Gastroenterology Clinic - [place] [phone]   [email or web address]     please follow up by having a repeat colonoscopy in 10 years.  If you do have further questions please don t hesitate to call the below  number.    To make an appointment call:  Please call [phone], for  [facility] or  for [facility], to schedule a follow up appointment in 2 weeks.     Sincerely,     Solitario Winter M.D.  ___

## 2022-08-30 NOTE — ANESTHESIA POSTPROCEDURE EVALUATION
Patient: Willian Reid    Procedure: Procedure(s):  COLONOSCOPY, FLEXIBLE, WITH LESION REMOVAL USING SNARE       Anesthesia Type:  General    Note:  Disposition: Outpatient   Postop Pain Control: Uneventful            Sign Out: Well controlled pain   PONV: No   Neuro/Psych: Uneventful            Sign Out: Acceptable/Baseline neuro status   Airway/Respiratory: Uneventful            Sign Out: Acceptable/Baseline resp. status   CV/Hemodynamics: Uneventful            Sign Out: Acceptable CV status; No obvious hypovolemia; No obvious fluid overload   Other NRE: NONE   DID A NON-ROUTINE EVENT OCCUR? No           Last vitals:  Vitals Value Taken Time   /68 08/30/22 1316   Temp 36.3  C (97.4  F) 08/30/22 1316   Pulse 49 08/30/22 1316   Resp 16 08/30/22 1316   SpO2 100 % 08/30/22 1323   Vitals shown include unvalidated device data.    Electronically Signed By: CECELIA Altamirano CRNA  August 30, 2022  1:24 PM

## 2022-08-30 NOTE — H&P
ENDOSCOPY PRE-SEDATION H&P FOR OUTPATIENT PROCEDURES    Willian Reid  [id number]  1950    Procedure:   Colonoscopy possible biopsy, possible polypectomy, with moderate sedation.     Pre-procedure diagnosis: positive cologuard test    Past medical history:   Past Medical History:   Diagnosis Date     Arthritis      Diverticulitis of colon (without mention of hemorrhage)(562.11) 01/01/1980     Hypertension      Uncomplicated asthma        Past surgical history:   Past Surgical History:   Procedure Laterality Date     ABDOMEN SURGERY       BIOPSY       COLONOSCOPY       FLEXIBLE SIGMOIDOSCOPY  03/02/2005     Flexible sigmoidoscopy probably should be changed to a colonoscopy at his next visit to get further and to help him with sedation, so in 2010 he should have a colonoscopy and then every 10 years afterwards     GI SURGERY       HERNIA REPAIR       LAPAROSCOPIC CHOLECYSTECTOMY N/A 06/09/2022    Procedure: CHOLECYSTECTOMY, LAPAROSCOPIC;  Surgeon: Joya Bains MD;  Location: [facility] OR     ORTHOPEDIC SURGERY         Current Facility-Administered Medications   Medication     lactated ringers infusion     lidocaine (LMX4) kit     lidocaine 1 % 0.1-1 mL     sodium chloride (PF) 0.9% PF flush 3 mL     sodium chloride (PF) 0.9% PF flush 3 mL       Allergies   Allergen Reactions     Tetracycline Rash       History of Anesthesia/Sedation Problems: no    Physical Exam:    Mental status: alert  Heart: Normal  Lung: Normal  Assessment of patient's airway: Normal  Other as pertinent for procedure: None     ASA Score: See Provation note    Mallampati score:  II - Faucial pillars and soft palate may be seen, but uvula is masked by the base of the tongue    Assessment/Plan:     The patient is an appropriate candidate to receive sedation.    Informed consent was discussed with the patient/family, including the risks, benefits, potential complications and any alternative options associated with sedation.    Patient assessment  completed just prior to sedation and while under constant observation by the provider. Condition determined to be adequate for proceeding with sedation.    The specific risks for the procedure were discussed with the patient at the time of informed consent and include but are not limited to perforation which could require surgery, missing significant neoplasm or lesion, hemorrhage and adverse sedative complication.      Solitario Winter MD

## 2022-08-30 NOTE — ANESTHESIA CARE TRANSFER NOTE
Patient: Willian Reid    Procedure: Procedure(s):  COLONOSCOPY, FLEXIBLE, WITH LESION REMOVAL USING SNARE       Diagnosis: Positive colorectal cancer screening using Cologuard test [R19.5]  Diagnosis Additional Information: No value filed.    Anesthesia Type:   General     Note:    Oropharynx: oropharynx clear of all foreign objects and spontaneously breathing  Level of Consciousness: drowsy  Oxygen Supplementation: room air    Independent Airway: airway patency satisfactory and stable  Dentition: dentition unchanged  Vital Signs Stable: post-procedure vital signs reviewed and stable  Report to RN Given: handoff report given  Patient transferred to: Phase II    Handoff Report: Identifed the Patient, Identified the Reponsible Provider, Reviewed the pertinent medical history, Discussed the surgical course, Reviewed Intra-OP anesthesia mangement and issues during anesthesia, Set expectations for post-procedure period and Allowed opportunity for questions and acknowledgement of understanding      Vitals:  Vitals Value Taken Time   /68 08/30/22 1316   Temp 36.3  C (97.4  F) 08/30/22 1316   Pulse 49 08/30/22 1316   Resp 16 08/30/22 1316   SpO2 100 % 08/30/22 1323   Vitals shown include unvalidated device data.    Electronically Signed By: CECELIA Altamirano CRNA  August 30, 2022  1:24 PM

## 2022-09-01 LAB
PATH REPORT.COMMENTS IMP SPEC: NORMAL
PATH REPORT.FINAL DX SPEC: NORMAL
PATH REPORT.GROSS SPEC: NORMAL
PATH REPORT.MICROSCOPIC SPEC OTHER STN: NORMAL
PATH REPORT.RELEVANT HX SPEC: NORMAL
PHOTO IMAGE: NORMAL

## 2022-09-01 PROCEDURE — 88305 TISSUE EXAM BY PATHOLOGIST: CPT | Mod: 26 | Performed by: PATHOLOGY

## 2022-09-14 ENCOUNTER — LAB (OUTPATIENT)
Dept: LAB | Facility: CLINIC | Age: 72
End: 2022-09-14
Payer: MEDICARE

## 2022-09-14 DIAGNOSIS — D62 ANEMIA DUE TO BLOOD LOSS, ACUTE: ICD-10-CM

## 2022-09-14 LAB
BASOPHILS # BLD AUTO: 0 10E3/UL (ref 0–0.2)
BASOPHILS NFR BLD AUTO: 0 %
EOSINOPHIL # BLD AUTO: 0.5 10E3/UL (ref 0–0.7)
EOSINOPHIL NFR BLD AUTO: 5 %
ERYTHROCYTE [DISTWIDTH] IN BLOOD BY AUTOMATED COUNT: 14.4 % (ref 10–15)
HCT VFR BLD AUTO: 40.2 % (ref 40–53)
HGB BLD-MCNC: 12.6 G/DL (ref 13.3–17.7)
HOLD SPECIMEN: NORMAL
LYMPHOCYTES # BLD AUTO: 2.8 10E3/UL (ref 0.8–5.3)
LYMPHOCYTES NFR BLD AUTO: 26 %
MCH RBC QN AUTO: 30.2 PG (ref 26.5–33)
MCHC RBC AUTO-ENTMCNC: 31.3 G/DL (ref 31.5–36.5)
MCV RBC AUTO: 96 FL (ref 78–100)
MONOCYTES # BLD AUTO: 0.9 10E3/UL (ref 0–1.3)
MONOCYTES NFR BLD AUTO: 8 %
NEUTROPHILS # BLD AUTO: 6.6 10E3/UL (ref 1.6–8.3)
NEUTROPHILS NFR BLD AUTO: 61 %
PLATELET # BLD AUTO: 265 10E3/UL (ref 150–450)
RBC # BLD AUTO: 4.17 10E6/UL (ref 4.4–5.9)
WBC # BLD AUTO: 10.8 10E3/UL (ref 4–11)

## 2022-09-14 PROCEDURE — 85025 COMPLETE CBC W/AUTO DIFF WBC: CPT

## 2022-09-14 PROCEDURE — 36415 COLL VENOUS BLD VENIPUNCTURE: CPT

## 2022-09-14 NOTE — RESULT ENCOUNTER NOTE
The elevated white blood cell count has resolved.  There is still mild anemia but it is improved from 1 month ago.  Since the anemia is improving, no further work-up is needed

## 2022-10-26 ENCOUNTER — IMMUNIZATION (OUTPATIENT)
Dept: FAMILY MEDICINE | Facility: CLINIC | Age: 72
End: 2022-10-26
Payer: MEDICARE

## 2022-10-26 PROCEDURE — G0008 ADMIN INFLUENZA VIRUS VAC: HCPCS

## 2022-10-26 PROCEDURE — 90662 IIV NO PRSV INCREASED AG IM: CPT

## 2022-12-18 ENCOUNTER — HEALTH MAINTENANCE LETTER (OUTPATIENT)
Age: 72
End: 2022-12-18

## 2022-12-23 ASSESSMENT — ENCOUNTER SYMPTOMS
DYSURIA: 0
SORE THROAT: 0
HEADACHES: 0
NERVOUS/ANXIOUS: 1
DIARRHEA: 0
HEARTBURN: 1
WEAKNESS: 0
HEMATURIA: 0
ARTHRALGIAS: 0
CHILLS: 0
SHORTNESS OF BREATH: 0
MYALGIAS: 1
HEMATOCHEZIA: 0
COUGH: 0
DIZZINESS: 0
FREQUENCY: 0
JOINT SWELLING: 0
NAUSEA: 0
PALPITATIONS: 0
ABDOMINAL PAIN: 1
EYE PAIN: 0
FEVER: 0
PARESTHESIAS: 0
CONSTIPATION: 0

## 2022-12-23 ASSESSMENT — ACTIVITIES OF DAILY LIVING (ADL): CURRENT_FUNCTION: NO ASSISTANCE NEEDED

## 2022-12-27 ENCOUNTER — IMMUNIZATION (OUTPATIENT)
Dept: FAMILY MEDICINE | Facility: CLINIC | Age: 72
End: 2022-12-27
Payer: MEDICARE

## 2022-12-27 PROCEDURE — 0124A COVID-19 VACCINE BIVALENT BOOSTER 12+ (PFIZER): CPT

## 2022-12-27 PROCEDURE — 91312 COVID-19 VACCINE BIVALENT BOOSTER 12+ (PFIZER): CPT

## 2022-12-30 ENCOUNTER — OFFICE VISIT (OUTPATIENT)
Dept: FAMILY MEDICINE | Facility: CLINIC | Age: 72
End: 2022-12-30
Payer: MEDICARE

## 2022-12-30 VITALS
WEIGHT: 157.2 LBS | BODY MASS INDEX: 26.19 KG/M2 | HEIGHT: 65 IN | HEART RATE: 56 BPM | RESPIRATION RATE: 16 BRPM | DIASTOLIC BLOOD PRESSURE: 80 MMHG | SYSTOLIC BLOOD PRESSURE: 128 MMHG | TEMPERATURE: 97 F

## 2022-12-30 DIAGNOSIS — Z90.49 HISTORY OF PARTIAL COLECTOMY: ICD-10-CM

## 2022-12-30 DIAGNOSIS — J30.2 SEASONAL ALLERGIC RHINITIS, UNSPECIFIED TRIGGER: ICD-10-CM

## 2022-12-30 DIAGNOSIS — I10 ESSENTIAL HYPERTENSION: ICD-10-CM

## 2022-12-30 DIAGNOSIS — K21.9 GASTROESOPHAGEAL REFLUX DISEASE WITHOUT ESOPHAGITIS: ICD-10-CM

## 2022-12-30 DIAGNOSIS — Z00.00 ENCOUNTER FOR MEDICARE ANNUAL WELLNESS EXAM: Primary | ICD-10-CM

## 2022-12-30 DIAGNOSIS — K55.9 ISCHEMIC COLITIS (H): ICD-10-CM

## 2022-12-30 PROBLEM — R10.13 ABDOMINAL PAIN, EPIGASTRIC: Status: RESOLVED | Noted: 2022-06-09 | Resolved: 2022-12-30

## 2022-12-30 PROBLEM — R14.0 ABDOMINAL BLOATING: Status: RESOLVED | Noted: 2022-06-09 | Resolved: 2022-12-30

## 2022-12-30 PROBLEM — K81.2 ACUTE CHOLECYSTITIS WITH CHRONIC CHOLECYSTITIS: Status: RESOLVED | Noted: 2022-06-09 | Resolved: 2022-12-30

## 2022-12-30 PROBLEM — R74.8 ELEVATED LIVER ENZYMES: Status: RESOLVED | Noted: 2022-07-11 | Resolved: 2022-12-30

## 2022-12-30 PROBLEM — R19.5 POSITIVE COLORECTAL CANCER SCREENING USING COLOGUARD TEST: Status: RESOLVED | Noted: 2022-08-02 | Resolved: 2022-12-30

## 2022-12-30 PROCEDURE — 99214 OFFICE O/P EST MOD 30 MIN: CPT | Mod: 25 | Performed by: INTERNAL MEDICINE

## 2022-12-30 PROCEDURE — G0439 PPPS, SUBSEQ VISIT: HCPCS | Performed by: INTERNAL MEDICINE

## 2022-12-30 RX ORDER — ALBUTEROL SULFATE 90 UG/1
2 AEROSOL, METERED RESPIRATORY (INHALATION) EVERY 4 HOURS PRN
Qty: 18 G | Refills: 11 | Status: SHIPPED | OUTPATIENT
Start: 2022-12-30 | End: 2024-01-03

## 2022-12-30 RX ORDER — LOSARTAN POTASSIUM 50 MG/1
50 TABLET ORAL DAILY
Qty: 90 TABLET | Refills: 3 | Status: SHIPPED | OUTPATIENT
Start: 2022-12-30 | End: 2024-01-03

## 2022-12-30 RX ORDER — FAMOTIDINE 40 MG/1
40 TABLET, FILM COATED ORAL DAILY
Qty: 90 TABLET | Refills: 3 | Status: SHIPPED | OUTPATIENT
Start: 2022-12-30 | End: 2024-01-03

## 2022-12-30 RX ORDER — ATENOLOL 100 MG/1
100 TABLET ORAL DAILY
Qty: 90 TABLET | Refills: 3 | Status: SHIPPED | OUTPATIENT
Start: 2022-12-30 | End: 2023-07-21

## 2022-12-30 ASSESSMENT — ACTIVITIES OF DAILY LIVING (ADL): CURRENT_FUNCTION: NO ASSISTANCE NEEDED

## 2022-12-30 NOTE — PROGRESS NOTES
"SUBJECTIVE:   Cesar is a 72 year old who presents for Preventive Visit.    Are you in the first 12 months of your Medicare coverage?  No    Healthy Habits:     In general, how would you rate your overall health?  Fair    Frequency of exercise:  6-7 days/week    Duration of exercise:  30-45 minutes    Do you usually eat at least 4 servings of fruit and vegetables a day, include whole grains    & fiber and avoid regularly eating high fat or \"junk\" foods?  Yes    Taking medications regularly:  Yes    Medication side effects:  None    Ability to successfully perform activities of daily living:  No assistance needed    Home Safety:  No safety concerns identified    Hearing Impairment:  No hearing concerns    In the past 6 months, have you been bothered by leaking of urine?  No    In general, how would you rate your overall mental or emotional health?  Good      PHQ-2 Total Score: 0    Colonoscopy:  --I cannot see patient was notified about there results which shows ischemic colitis; he is aware of results; he reports the surgeon told him to take metamucil; he was taking TID. This caused bloating so he is only taking it every day and this has helped regulate his bowel movements  --occ gets RLQ pain with palpation, nothing severe  --never had melena or bloody stools  --history partial colectomy 2009 due to abscess/diverticulitis    Hypertension - well controlled blood pressure at home on meds;l  Brings home blood pressure log - 120-130.  Nothing this high    History of COVID 9/23;  Had mild symptoms - cold symptoms     Have you ever done Advance Care Planning? (For example, a Health Directive, POLST, or a discussion with a medical provider or your loved ones about your wishes): Yes, advance care planning is on file.       Fall risk  Fallen 2 or more times in the past year?: No  Any fall with injury in the past year?: No    Cognitive Screening   1) Repeat 3 items (Leader, Season, Table)    2) Clock draw: NORMAL  3) 3 item " recall: Recalls 3 objects  Results: 3 items recalled: COGNITIVE IMPAIRMENT LESS LIKELY    Mini-CogTM Copyright MAREN Barlow. Licensed by the author for use in [facility]; reprinted with permission (jean@[facility]). All rights reserved.          Reviewed and updated as needed this visit by clinical staff   Tobacco  Allergies  Meds  Problems   Surg Hx           Reviewed and updated as needed this visit by Provider      Problems   Surg Hx          Social History     Tobacco Use     Smoking status: Former     Packs/day: 0.50     Years: 4.00     Pack years: 2.00     Types: Cigarettes     Start date: 1967     Quit date: 1971     Years since quittin.0     Smokeless tobacco: Never   Substance Use Topics     Alcohol use: No         Alcohol Use 2022   Prescreen: >3 drinks/day or >7 drinks/week? Not Applicable   Prescreen: >3 drinks/day or >7 drinks/week? -       Current providers sharing in care for this patient include:   Patient Care Team:  Amy Chandler DO as PCP - General (Internal Medicine)  Amy Chandler DO as Assigned PCP  Bains, MD Joya as Assigned Surgical Provider    The following health maintenance items are reviewed in Epic and correct as of today:  Health Maintenance   Topic Date Due     HEPATITIS C SCREENING  Never done     ZOSTER IMMUNIZATION (1 of 2) Never done     ANNUAL REVIEW OF HM ORDERS  07/15/2023     MEDICARE ANNUAL WELLNESS VISIT  2023     FALL RISK ASSESSMENT  2023     DTAP/TDAP/TD IMMUNIZATION (3 - Td or Tdap) 2025     LIPID  11/10/2026     ADVANCE CARE PLANNING  2027     COLORECTAL CANCER SCREENING  2032     PHQ-2 (once per calendar year)  Completed     INFLUENZA VACCINE  Completed     Pneumococcal Vaccine: 65+ Years  Completed     AORTIC ANEURYSM SCREENING (SYSTEM ASSIGNED)  Completed     COVID-19 Vaccine  Completed     IPV IMMUNIZATION  Aged Out     MENINGITIS IMMUNIZATION  Aged Out     Current Outpatient Medications  "  Medication Sig Dispense Refill     albuterol (PROAIR HFA/PROVENTIL HFA/VENTOLIN HFA) 108 (90 Base) MCG/ACT inhaler Inhale 2 puffs into the lungs every 4 hours as needed for shortness of breath / dyspnea or wheezing 18 g 11     atenolol (TENORMIN) 100 MG tablet Take 1 tablet (100 mg) by mouth daily 90 tablet 3     famotidine (PEPCID) 40 MG tablet Take 1 tablet (40 mg) by mouth daily 90 tablet 3     losartan (COZAAR) 50 MG tablet Take 1 tablet (50 mg) by mouth daily Please schedule Medicare annual wellness visit 90 tablet 0     MULTIVITAMINS OR TABS Take 1 tablet by mouth daily 100 3     Probiotic Product (PRO-BIOTIC BLEND) CAPS Take 1 capsule by mouth daily       Psyllium (METAMUCIL PO)                Review of Systems  Constitutional, HEENT, cardiovascular, pulmonary, GI, , musculoskeletal, neuro, skin, endocrine and psych systems are negative, except as otherwise noted.    OBJECTIVE:   BP (!) 150/84 (BP Location: Right arm, Patient Position: Sitting, Cuff Size: Adult Regular)   Pulse 56   Temp 97  F (36.1  C) (Tympanic)   Resp 16   Ht 1.638 m (5' 4.5\")   Wt 71.3 kg (157 lb 3.2 oz)   BMI 26.57 kg/m   Estimated body mass index is 26.57 kg/m  as calculated from the following:    Height as of this encounter: 1.638 m (5' 4.5\").    Weight as of this encounter: 71.3 kg (157 lb 3.2 oz).  Physical Exam  GENERAL: healthy, alert and no distress  EYES: Eyes grossly normal to inspection, PERRL and conjunctivae and sclerae normal  HENT: ear canals and TM's normal, nose and mouth without ulcers or lesions  NECK: no adenopathy, no asymmetry, masses, or scars and thyroid normal to palpation  RESP: lungs clear to auscultation - no rales, rhonchi or wheezes  CV: regular rate and rhythm, normal S1 S2, no S3 or S4, no murmur, click or rub, no peripheral edema and peripheral pulses strong  ABDOMEN: soft, nontender, no hepatosplenomegaly, no masses and bowel sounds normal  MS: no gross musculoskeletal defects noted, no " edema  SKIN: no suspicious lesions or rashes  NEURO: Normal strength and tone, mentation intact and speech normal  PSYCH: mentation appears normal, affect normal/bright        ASSESSMENT / PLAN:   (Z00.00) Encounter for Medicare annual wellness exam  (primary encounter diagnosis)  Comment:   Plan:     (K55.9) Ischemic colitis (H)  Comment: In a colonoscopy but he was having no symptoms at the time.  No treatment is needed.  His bowels are better regulated with Metamucil daily.  No specific follow-up needed  Plan: OFFICE/OUTPT VISIT,EST,LEVL IV            (J30.2) Seasonal allergic rhinitis, unspecified trigger  Comment:  - stable, refill provided  Plan: albuterol (PROAIR HFA/PROVENTIL HFA/VENTOLIN         HFA) 108 (90 Base) MCG/ACT inhaler,         OFFICE/OUTPT VISIT,EST,LEVL IV            (I10) Essential hypertension  Comment: Higher initially, improved on recheck.  Blood pressure at home is well controlled  Plan: atenolol (TENORMIN) 100 MG tablet, losartan         (COZAAR) 50 MG tablet, OFFICE/OUTPT         VISIT,EST,LEVL IV            (K21.9) Gastroesophageal reflux disease without esophagitis  Comment:  - stable, refill provided  Plan: famotidine (PEPCID) 40 MG tablet, OFFICE/OUTPT         VISIT,EST,LEVL IV            (Z90.49) History of partial colectomy  Comment:   Plan: OFFICE/OUTPT VISIT,EST,LEVL IV              Patient has been advised of split billing requirements and indicates understanding: Yes      COUNSELING:  Reviewed preventive health counseling, as reflected in patient instructions        He reports that he quit smoking about 52 years ago. His smoking use included cigarettes. He started smoking about 56 years ago. He has a 2.00 pack-year smoking history. He has never used smokeless tobacco.      Appropriate preventive services were discussed with this patient, including applicable screening as appropriate for cardiovascular disease, diabetes, osteopenia/osteoporosis, and glaucoma.  As appropriate for  age/gender, discussed screening for colorectal cancer, prostate cancer, breast cancer, and cervical cancer. Checklist reviewing preventive services available has been given to the patient.    Reviewed patients plan of care and provided an AVS. The Complex Care Plan (for patients with higher acuity and needing more deliberate coordination of services) for Willian meets the Care Plan requirement. This Care Plan has been established and reviewed with the Patient.          Amy [facility]    Identified Health Risks:

## 2022-12-30 NOTE — PATIENT INSTRUCTIONS
Asthma/ hypertension  Refills sent  No blood work needed today      Preventive Health Recommendations:     See your health care provider every year to  Review health changes.   Discuss preventive care.    Review your medicines if your doctor has prescribed any.    Talk with your health care provider about whether you should have a test to screen for prostate cancer (PSA).  Every 3 years, have a diabetes test (fasting glucose). If you are at risk for diabetes, you should have this test more often.  Every 5 years, have a cholesterol test. Have this test more often if you are at risk for high cholesterol or heart disease.   Every 10 years, have a colonoscopy. Or, have a yearly FIT test (stool test). These exams will check for colon cancer.  Talk to with your health care provider about screening for Abdominal Aortic Aneurysm if you have a family history of AAA or have a history of smoking.    Shots:   Get a flu shot each year.   Get a tetanus shot every 10 years.   Talk to your doctor about your pneumonia vaccines. There are now two you should receive - Pneumovax (PPSV 23) and Prevnar (PCV 13).   Talk to your pharmacist about a shingles vaccine.   Talk to your doctor about the hepatitis B vaccine.  Nutrition:   Eat at least 5 servings of fruits and vegetables each day.   Eat whole-grain bread, whole-wheat pasta and brown rice instead of white grains and rice.   Get adequate Calcium and Vitamin D.   Lifestyle  Exercise for at least 150 minutes a week (30 minutes a day, 5 days a week). This will help you control your weight and prevent disease.   Limit alcohol to one drink per day.   No smoking.   Wear sunscreen to prevent skin cancer.  See your dentist every six months for an exam and cleaning.  See your eye doctor every 1 to 2 years to screen for conditions such as glaucoma, macular degeneration, cataracts, etc.    Personalized Prevention Plan  You are due for the preventive services outlined below.  Your care team is  available to assist you in scheduling these services.  If you have already completed any of these items, please share that information with your care team to update in your medical record.  Health Maintenance Due   Topic Date Due    Hepatitis C Screening  Never done    Zoster (Shingles) Vaccine (1 of 2) Never done    Annual Wellness Visit  11/10/2022     Patient Education   Personalized Prevention Plan  You are due for the preventive services outlined below.  Your care team is available to assist you in scheduling these services.  If you have already completed any of these items, please share that information with your care team to update in your medical record.  Health Maintenance Due   Topic Date Due    Hepatitis C Screening  Never done    Zoster (Shingles) Vaccine (1 of 2) Never done     Your Health Risk Assessment indicates you feel you are not in good health    A healthy lifestyle helps keep the body fit and the mind alert. It helps protect you from disease, helps you fight disease, and helps prevent chronic disease (disease that doesn't go away) from getting worse. This is important as you get older and begin to notice twinges in muscles and joints and a decline in the strength and stamina you once took for granted. A healthy lifestyle includes good healthcare, good nutrition, weight control, recreation, and regular exercise. Avoid harmful substances and do what you can to keep safe. Another part of a healthy lifestyle is stay mentally active and socially involved.    Good healthcare   Have a wellness visit every year.   If you have new symptoms, let us know right away. Don't wait until the next checkup.   Take medicines exactly as prescribed and keep your medicines in a safe place. Tell us if your medicine causes problems.   Healthy diet and weight control   Eat 3 or 4 small, nutritious, low-fat, high-fiber meals a day. Include a variety of fruits, vegetables, and whole-grain foods.   Make sure you get enough  calcium in your diet. Calcium, vitamin D, and exercise help prevent osteoporosis (bone thinning).   If you live alone, try eating with others when you can. That way you get a good meal and have company while you eat it.   Try to keep a healthy weight. If you eat more calories than your body uses for energy, it will be stored as fat and you will gain weight.     Recreation   Recreation is not limited to sports and team events. It includes any activity that provides relaxation, interest, enjoyment, and exercise. Recreation provides an outlet for physical, mental, and social energy. It can give a sense of worth and achievement. It can help you stay healthy.    Mental Exercise and Social Involvement  Mental and emotional health is as important as physical health. Keep in touch with friends and family. Stay as active as possible. Continue to learn and challenge yourself.   Things you can do to stay mentally active are:  Learn something new, like a foreign language or musical instrument.   Play SCRABBLE or do crossword puzzles. If you cannot find people to play these games with you at home, you can play them with others on your computer through the Internet.   Join a games club--anything from card games to chess or checkers or lawn bowling.   Start a new hobby.   Go back to school.   Volunteer.   Read.   Keep up with world events.

## 2022-12-30 NOTE — PROGRESS NOTES
"    The patient was provided with suggestions to help him develop a healthy physical lifestyle.  Answers for HPI/ROS submitted by the patient on 12/23/2022  In general, how would you rate your overall physical health?: fair  Frequency of exercise:: 6-7 days/week  Do you usually eat at least 4 servings of fruit and vegetables a day, include whole grains & fiber, and avoid regularly eating high fat or \"junk\" foods? : Yes  Taking medications regularly:: Yes  Medication side effects:: None  Activities of Daily Living: no assistance needed  Home safety: no safety concerns identified  Hearing Impairment:: no hearing concerns  In the past 6 months, have you been bothered by leaking of urine?: No  abdominal pain: Yes  Blood in stool: No  Blood in urine: No  chest pain: No  chills: No  congestion: No  constipation: No  cough: No  diarrhea: No  dizziness: No  ear pain: No  eye pain: No  nervous/anxious: Yes  fever: No  frequency: No  genital sores: No  headaches: No  hearing loss: No  heartburn: Yes  arthralgias: No  joint swelling: No  peripheral edema: No  mood changes: No  myalgias: Yes  nausea: No  dysuria: No  palpitations: No  Skin sensation changes: No  sore throat: No  urgency: No  rash: No  shortness of breath: No  visual disturbance: No  weakness: No  impotence: No  penile discharge: No  In general, how would you rate your overall mental or emotional health?: good  Duration of exercise:: 30-45 minutes        "

## 2023-04-28 ASSESSMENT — PATIENT HEALTH QUESTIONNAIRE - PHQ9
SUM OF ALL RESPONSES TO PHQ QUESTIONS 1-9: 17
10. IF YOU CHECKED OFF ANY PROBLEMS, HOW DIFFICULT HAVE THESE PROBLEMS MADE IT FOR YOU TO DO YOUR WORK, TAKE CARE OF THINGS AT HOME, OR GET ALONG WITH OTHER PEOPLE: SOMEWHAT DIFFICULT
SUM OF ALL RESPONSES TO PHQ QUESTIONS 1-9: 17

## 2023-05-05 ENCOUNTER — OFFICE VISIT (OUTPATIENT)
Dept: FAMILY MEDICINE | Facility: CLINIC | Age: 73
End: 2023-05-05
Payer: MEDICARE

## 2023-05-05 VITALS
RESPIRATION RATE: 12 BRPM | HEIGHT: 65 IN | HEART RATE: 59 BPM | WEIGHT: 154 LBS | SYSTOLIC BLOOD PRESSURE: 124 MMHG | TEMPERATURE: 97.1 F | DIASTOLIC BLOOD PRESSURE: 82 MMHG | BODY MASS INDEX: 25.66 KG/M2 | OXYGEN SATURATION: 99 %

## 2023-05-05 DIAGNOSIS — K21.9 GASTROESOPHAGEAL REFLUX DISEASE, UNSPECIFIED WHETHER ESOPHAGITIS PRESENT: ICD-10-CM

## 2023-05-05 DIAGNOSIS — R10.31 RLQ ABDOMINAL PAIN: Primary | ICD-10-CM

## 2023-05-05 DIAGNOSIS — Z90.49 HISTORY OF CHOLECYSTECTOMY: ICD-10-CM

## 2023-05-05 DIAGNOSIS — K85.90 ACUTE PANCREATITIS, UNSPECIFIED COMPLICATION STATUS, UNSPECIFIED PANCREATITIS TYPE: ICD-10-CM

## 2023-05-05 DIAGNOSIS — Z90.49 HISTORY OF PARTIAL COLECTOMY: ICD-10-CM

## 2023-05-05 DIAGNOSIS — K55.9 ISCHEMIC COLITIS (H): ICD-10-CM

## 2023-05-05 LAB
ALBUMIN SERPL BCG-MCNC: 4.3 G/DL (ref 3.5–5.2)
ALP SERPL-CCNC: 95 U/L (ref 40–129)
ALT SERPL W P-5'-P-CCNC: 13 U/L (ref 10–50)
ANION GAP SERPL CALCULATED.3IONS-SCNC: 8 MMOL/L (ref 7–15)
AST SERPL W P-5'-P-CCNC: 18 U/L (ref 10–50)
BASOPHILS # BLD AUTO: 0 10E3/UL (ref 0–0.2)
BASOPHILS NFR BLD AUTO: 0 %
BILIRUB SERPL-MCNC: 0.4 MG/DL
BUN SERPL-MCNC: 15 MG/DL (ref 8–23)
CALCIUM SERPL-MCNC: 10.3 MG/DL (ref 8.8–10.2)
CHLORIDE SERPL-SCNC: 103 MMOL/L (ref 98–107)
CREAT SERPL-MCNC: 1.09 MG/DL (ref 0.67–1.17)
DEPRECATED HCO3 PLAS-SCNC: 29 MMOL/L (ref 22–29)
EOSINOPHIL # BLD AUTO: 0.2 10E3/UL (ref 0–0.7)
EOSINOPHIL NFR BLD AUTO: 2 %
ERYTHROCYTE [DISTWIDTH] IN BLOOD BY AUTOMATED COUNT: 13.2 % (ref 10–15)
GFR SERPL CREATININE-BSD FRML MDRD: 71 ML/MIN/1.73M2
GLUCOSE SERPL-MCNC: 111 MG/DL (ref 70–99)
HCT VFR BLD AUTO: 39.2 % (ref 40–53)
HGB BLD-MCNC: 12.4 G/DL (ref 13.3–17.7)
IMM GRANULOCYTES # BLD: 0 10E3/UL
IMM GRANULOCYTES NFR BLD: 0 %
LIPASE SERPL-CCNC: 26 U/L (ref 13–60)
LYMPHOCYTES # BLD AUTO: 2.1 10E3/UL (ref 0.8–5.3)
LYMPHOCYTES NFR BLD AUTO: 18 %
MCH RBC QN AUTO: 28.1 PG (ref 26.5–33)
MCHC RBC AUTO-ENTMCNC: 31.6 G/DL (ref 31.5–36.5)
MCV RBC AUTO: 89 FL (ref 78–100)
MONOCYTES # BLD AUTO: 0.8 10E3/UL (ref 0–1.3)
MONOCYTES NFR BLD AUTO: 7 %
NEUTROPHILS # BLD AUTO: 8.3 10E3/UL (ref 1.6–8.3)
NEUTROPHILS NFR BLD AUTO: 73 %
PLATELET # BLD AUTO: 417 10E3/UL (ref 150–450)
POTASSIUM SERPL-SCNC: 5.1 MMOL/L (ref 3.4–5.3)
PROT SERPL-MCNC: 9.5 G/DL (ref 6.4–8.3)
RBC # BLD AUTO: 4.42 10E6/UL (ref 4.4–5.9)
SODIUM SERPL-SCNC: 140 MMOL/L (ref 136–145)
WBC # BLD AUTO: 11.4 10E3/UL (ref 4–11)

## 2023-05-05 PROCEDURE — 99214 OFFICE O/P EST MOD 30 MIN: CPT | Performed by: INTERNAL MEDICINE

## 2023-05-05 PROCEDURE — 36415 COLL VENOUS BLD VENIPUNCTURE: CPT | Performed by: INTERNAL MEDICINE

## 2023-05-05 PROCEDURE — 85025 COMPLETE CBC W/AUTO DIFF WBC: CPT | Performed by: INTERNAL MEDICINE

## 2023-05-05 PROCEDURE — 83690 ASSAY OF LIPASE: CPT | Performed by: INTERNAL MEDICINE

## 2023-05-05 PROCEDURE — 80053 COMPREHEN METABOLIC PANEL: CPT | Performed by: INTERNAL MEDICINE

## 2023-05-05 ASSESSMENT — PATIENT HEALTH QUESTIONNAIRE - PHQ9
SUM OF ALL RESPONSES TO PHQ QUESTIONS 1-9: 17
10. IF YOU CHECKED OFF ANY PROBLEMS, HOW DIFFICULT HAVE THESE PROBLEMS MADE IT FOR YOU TO DO YOUR WORK, TAKE CARE OF THINGS AT HOME, OR GET ALONG WITH OTHER PEOPLE: SOMEWHAT DIFFICULT

## 2023-05-05 ASSESSMENT — PAIN SCALES - GENERAL: PAINLEVEL: MODERATE PAIN (4)

## 2023-05-05 NOTE — RESULT ENCOUNTER NOTE
The calcium is very slightly elevated.  The total proteins in the blood are also very slightly elevated.  The remaining electrolytes, kidney and pancreas function, liver enzymes are all normal.  Nonfasting blood sugar is also normal.  There is persisting very mild anemia and slightly elevated white blood cell count.  Proceed with plan to get the CT.  If CT is unrevealing, we will proceed with second round of blood testing due to some of these minor abnormalities as above.

## 2023-05-05 NOTE — PROGRESS NOTES
Assessment & Plan     RLQ abdominal pain post-cholecystectomy syndrome vs occult abscess vs retained stone vs chronic pancreatitis vs GERD vs other.  Pain is daily, interfering with exercise and needing significant dietary modifications.  If all testing is normal, see GI due to complex GI history   - Comprehensive metabolic panel (BMP + Alb, Alk Phos, ALT, AST, Total. Bili, TP); Future  - Lipase; Future  - CBC with platelets and differential; Future  - CT Abdomen Pelvis w Contrast; Future  - Adult GI  Referral - Consult Only; Future  - Comprehensive metabolic panel (BMP + Alb, Alk Phos, ALT, AST, Total. Bili, TP)  - Lipase  - CBC with platelets and differential    History of partial colectomy  - Adult GI  Referral - Consult Only; Future    Ischemic colitis (H)  - Adult GI  Referral - Consult Only; Future    Acute pancreatitis, unspecified complication status, unspecified pancreatitis type  - Adult GI  Referral - Consult Only; Future    History of cholecystectomy  - Adult GI  Referral - Consult Only; Future    Gastroesophageal reflux disease, unspecified whether esophagitis present  - Adult GI  Referral - Consult Only; Future      Patient Instructions   Abdominal Pain  1. Blood work today  2. Get CT scan.  3. If symptoms persist, recommend follow-up with GI      Amy Chandler [facility]    Trent Guerrero is a 73 year old, presenting for the following health issues:  Abdominal Pain        5/5/2023     6:49 AM   Additional Questions   Roomed by zaida nava   Accompanied by self         5/5/2023     6:49 AM   Patient Reported Additional Medications   Patient reports taking the following new medications none     History of Present Illness       Reason for visit:  Abdominal/side pain-no appetite-always tired-cough    He eats 0-1 servings of fruits and vegetables daily.He consumes 0 sweetened beverage(s) daily.He exercises with  enough effort to increase his heart rate 20 to 29 minutes per day.  He exercises with enough effort to increase his heart rate 4 days per week.   He is taking medications regularly.    Today's PHQ-9         PHQ-9 Total Score: 17    PHQ-9 Q9 Thoughts of better off dead/self-harm past 2 weeks :   Not at all    How difficult have these problems made it for you to do your work, take care of things at home, or get along with other people: Somewhat difficult     Chief Complaint   Patient presents with     Abdominal Pain         Pain History:  When did you first notice your pain? June 2022   Have you seen anyone else for your pain? Yes - Joya Bains MD CHOLECYSTECTOMY, LAPAROSCOPIC  How has your pain affected your ability to work? Not applicable  Where in your body do you have pain? Abdominal/Flank Pain  Onset/Duration: June 2022  Description:   Character: Dull ache  Location: right lower quadrant  Radiation: Pelvic region  Intensity: mild, moderate  Progression of Symptoms:  same  Accompanying Signs & Symptoms:  Fever/Chills: No  Gas/Bloating: YES- bloating  Nausea: No  Vomitting: No  Diarrhea: No  Constipation: No  Dysuria or Hematuria: No  History:   Trauma: YES-  Joya Bains MD CHOLECYSTECTOMY, LAPAROSCOPIC    Previous similar pain: YES- June 2022  Previous tests done: x-ray, CT and Colonoscopy  Precipitating factors:   Does the pain change with:     Food: No    Bowel Movement: No    Urination: No   Other factors: finger will turn blue some times and then white   Therapies tried and outcome:  Joya Bains MD CHOLECYSTECTOMY, LAPAROSCOPIC in June  --he has history of cholecystetomcy 6/2022 complicated by post-op pancreatitis  --he has history of ischemic colitis seen on c-scope 8/22, no symptoms, thus no treatment; colonoscopy done due to ++FIT  --he has history of partial colectomy - 9 inches colon removed due to abscess from diverticulitis in ~ 2009    --today, he reports near daily intermittent RLQ, right mid  abdominal pain;  Not severe  --feels appetite for meat is decreased since his GB surgery; chicken is more appealing to him now  --when he overeats, he will have epigastric and RLQ pain  --physical activity seems to improve the pain  --no N/V/D/C  --is still using metamucil daily, no side effects, having daily bowel movement   --pain is better in AM;  Worse as day goes on;; using Tylenol 1000 mg at HS to help with pain and then able to sleep well  --he thinks greasy foods or overeating may worsen the RLQ pain;  Eating smaller meals and 'healthier foods' lessen the pain;  --cannot recall going 1 day w/out pain since GB surgery  --no fevers; does not occ night sweats  --is not exercising as much as normal due to the pain.  Feels tired at end of day which started after GB surgery  --has lost a few lbs      From our last visit 12/2022  Colonoscopy:  --I cannot see patient was notified about there results which shows ischemic colitis; he is aware of results; he reports the surgeon told him to take metamucil; he was taking TID. This caused bloating so he is only taking it every day and this has helped regulate his bowel movements  --occ gets RLQ pain with palpation, nothing severe  --never had melena or bloody stools  --history partial colectomy 2009 due to abscess/diverticulitis      Current Outpatient Medications   Medication Sig Dispense Refill     albuterol (PROAIR HFA/PROVENTIL HFA/VENTOLIN HFA) 108 (90 Base) MCG/ACT inhaler Inhale 2 puffs into the lungs every 4 hours as needed for shortness of breath or wheezing 18 g 11     atenolol (TENORMIN) 100 MG tablet Take 1 tablet (100 mg) by mouth daily 90 tablet 3     famotidine (PEPCID) 40 MG tablet Take 1 tablet (40 mg) by mouth daily 90 tablet 3     losartan (COZAAR) 50 MG tablet Take 1 tablet (50 mg) by mouth daily 90 tablet 3     MULTIVITAMINS OR TABS Take 1 tablet by mouth daily 100 3     Probiotic Product (PRO-BIOTIC BLEND) CAPS Take 1 capsule by mouth daily        "Psyllium (METAMUCIL PO)              Review of Systems   Constitutional, HEENT, cardiovascular, pulmonary, gi and gu systems are negative, except as otherwise noted.      Objective    /82 (BP Location: Right arm, Patient Position: Sitting, Cuff Size: Adult Regular)   Pulse 59   Temp 97.1  F (36.2  C) (Tympanic)   Resp 12   Ht 1.645 m (5' 4.76\")   Wt 69.9 kg (154 lb)   SpO2 99%   BMI 25.81 kg/m    Body mass index is 25.81 kg/m .  Physical Exam   GENERAL APPEARANCE: healthy, alert and no distress  RESP: lungs clear to auscultation - no rales, rhonchi or wheezes  CV: regular rates and rhythm, normal S1 S2, no S3 or S4 and no murmur, click or rub  ABDOMEN: soft, mild-moderate RLQ pain, without hepatosplenomegaly or masses and bowel sounds normal                    "

## 2023-05-06 ENCOUNTER — MYC MEDICAL ADVICE (OUTPATIENT)
Dept: FAMILY MEDICINE | Facility: CLINIC | Age: 73
End: 2023-05-06
Payer: MEDICARE

## 2023-05-06 ENCOUNTER — HOSPITAL ENCOUNTER (OUTPATIENT)
Dept: CT IMAGING | Facility: CLINIC | Age: 73
Discharge: HOME | End: 2023-05-06
Attending: INTERNAL MEDICINE | Admitting: INTERNAL MEDICINE
Payer: MEDICARE

## 2023-05-06 ENCOUNTER — NURSE TRIAGE (OUTPATIENT)
Dept: NURSING | Facility: CLINIC | Age: 73
End: 2023-05-06
Payer: MEDICARE

## 2023-05-06 DIAGNOSIS — R10.31 RLQ ABDOMINAL PAIN: ICD-10-CM

## 2023-05-06 DIAGNOSIS — K68.12 PSOAS MUSCLE ABSCESS (H): Primary | ICD-10-CM

## 2023-05-06 PROCEDURE — 250N000009 HC RX 250: Performed by: INTERNAL MEDICINE

## 2023-05-06 PROCEDURE — G1010 CDSM STANSON: HCPCS

## 2023-05-06 PROCEDURE — 250N000011 HC RX IP 250 OP 636: Performed by: INTERNAL MEDICINE

## 2023-05-06 RX ORDER — IOPAMIDOL 755 MG/ML
68 INJECTION, SOLUTION INTRAVASCULAR ONCE
Status: COMPLETED | OUTPATIENT
Start: 2023-05-06 | End: 2023-05-06

## 2023-05-06 RX ADMIN — IOPAMIDOL 68 ML: 755 INJECTION, SOLUTION INTRAVENOUS at 16:24

## 2023-05-06 RX ADMIN — SODIUM CHLORIDE 59 ML: 9 INJECTION, SOLUTION INTRAVENOUS at 16:24

## 2023-05-07 NOTE — TELEPHONE ENCOUNTER
Triage Call:    Cheyanne from [facility] is calling to report critical results.  Imaging ordered by Dr Chandler from [facility].    Transferred to answering service to further assist.    Qi Díaz RN  05/06/23 7:30 PM  [facility] Nurse Advisor    Reason for Disposition    Lab or radiology calling with CRITICAL test results    Additional Information    Negative: Lab calling with strep throat test results and triager can call in prescription    Negative: Lab calling with urinalysis test results and triager can call in prescription    Negative: Medication questions    Negative: Medication renewal and refill questions    Negative: Pre-operative or pre-procedural questions    Negative: Call about patient who is currently hospitalized    Negative: Doctor (or NP/PA) call to PCP    Negative: ED call to PCP (i.e., primary care provider; doctor, NP, or PA)    Protocols used: PCP CALL - NO TRIAGE-A-

## 2023-05-07 NOTE — CONFIDENTIAL NOTE
On-call note.    I was called by the radiologist to review recent CT scan of the abdomen.  Findings suggest psoas muscle abscess.  I called the patient, he feels well, symptoms have been going on since June 2022.    Since the patient does not appear toxic, will start oral antibiotics, Augmentin, and see if there is clinical improvement.  Advised the patient to follow-up with his primary care physician next week.  Pres[place] was [place] [facility]

## 2023-05-16 ENCOUNTER — OFFICE VISIT (OUTPATIENT)
Dept: FAMILY MEDICINE | Facility: CLINIC | Age: 73
End: 2023-05-16
Payer: MEDICARE

## 2023-05-16 VITALS
OXYGEN SATURATION: 99 % | WEIGHT: 153 LBS | RESPIRATION RATE: 16 BRPM | BODY MASS INDEX: 25.49 KG/M2 | TEMPERATURE: 97 F | HEIGHT: 65 IN | DIASTOLIC BLOOD PRESSURE: 70 MMHG | SYSTOLIC BLOOD PRESSURE: 122 MMHG | HEART RATE: 56 BPM

## 2023-05-16 DIAGNOSIS — Z11.59 NEED FOR HEPATITIS C SCREENING TEST: ICD-10-CM

## 2023-05-16 DIAGNOSIS — K68.12 PSOAS MUSCLE ABSCESS (H): Primary | ICD-10-CM

## 2023-05-16 DIAGNOSIS — Z23 NEED FOR VACCINATION: ICD-10-CM

## 2023-05-16 PROCEDURE — 0134A COVID-19 BIVALENT 18+ (MODERNA): CPT | Performed by: INTERNAL MEDICINE

## 2023-05-16 PROCEDURE — 91313 COVID-19 BIVALENT 18+ (MODERNA): CPT | Performed by: INTERNAL MEDICINE

## 2023-05-16 PROCEDURE — 99214 OFFICE O/P EST MOD 30 MIN: CPT | Mod: 25 | Performed by: INTERNAL MEDICINE

## 2023-05-16 ASSESSMENT — PAIN SCALES - GENERAL: PAINLEVEL: NO PAIN (0)

## 2023-05-16 NOTE — PATIENT INSTRUCTIONS
Iliopsoas Muscle Abscess  Probably local spread from colon infection or Gallbladder surgery, hard to say for sure  Sometimes this infection needs to be treated with surgical drainage and/or IV antibiotics  Finish course of antibiotic  Get repeat CT ~ 2 weeks after you finish antibiotic; would probably also report CT scan in 4-6 weeks assuming the abscess is gone in 2 weeks.  If symptoms come back at any time, let me know ASAP

## 2023-05-16 NOTE — PROGRESS NOTES
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  History is confounded by his cholecystectomy followed by pancreatitis then ischemic colitis in August.  It is unclear which event led to the local spread to the psoas muscle causing an abscess.  He has not been septic since onset of symptoms in summer 2022.  Outpatient treatment and deferring surgical treatment is reasonable.  He is feeling significantly better with near total resolution of his systemic and local abdominal pain symptoms.  However, he is still at high risk of needing IV antibiotics or surgical drainage.  Finish course of oral antibiotic.  Get CT scan about 2 weeks after completing course.  May need a longer oral course versus IV antibiotics depending on the appearance.  Low threshold to consult surgery for definitive treatment.  Patient was instructed to be seen right away if symptoms return off antibiotics  - CT Abdomen Pelvis w Contrast; Future    Need for hepatitis C screening test  - Hepatitis C Screen Reflex to HCV RNA Quant and Genotype; Future    Need for vaccination  - COVID-19 BIVALENT 18+ (MODERNA)    Patient Instructions   Iliopsoas Muscle Abscess  1. Probably local spread from colon infection or Gallbladder surgery, hard to say for sure  2. Sometimes this infection needs to be treated with surgical drainage and/or IV antibiotics  3. Finish course of antibiotic  4. Get repeat CT ~ 2 weeks after you finish antibiotic; would probably also report CT scan in 4-6 weeks assuming the abscess is gone in 2 weeks.  5. If symptoms come back at any time, let me know ASAP      Amy Chandler DO  [facility]    Trent Guerrero is a 73 year old, presenting for the following health issues:  Follow Up (No more abdominal pain still have 10 pill left , appetite is getting better ,  3 meals a day, 1 cookie and coffee a day,  having more energy, gettting out and exercise , walking and riding  bike daily )        5/16/2023     7:05 AM   Additional Questions   Roomed by zaida nava   Accompanied by self         5/16/2023     7:05 AM   Patient Reported Additional Medications   Patient reports taking the following new medications none     History of Present Illness       Reason for visit:  Abdominal/side pain-no appetite-always tired-cough    He eats 0-1 servings of fruits and vegetables daily.He consumes 0 sweetened beverage(s) daily.He exercises with enough effort to increase his heart rate 20 to 29 minutes per day.  He exercises with enough effort to increase his heart rate 4 days per week.   He is taking medications regularly.     Chief Complaint   Patient presents with     Follow Up     No more abdominal pain still have 10 pill left , appetite is getting better ,  3 meals a day, 1 cookie and coffee a day,  having more energy, gettting out and exercise , walking and riding bike daily      Ilacus/Psoas muscle abscess  --seen on CT as below  --result was called to on-call doctor who started augment BID x 14 days  --he reports feeling significantly better - appetite is nearly back to normal, activity level is nearly normalized, pain is gone;  Estimates he is 98% improved; he has 5 days remaining of antibiotic   --in retrospect, he reports symptoms started after cholecystomy 6/2022, which was complicated by pancreatitis;  Then he has c-scope 8/2022 which showed ischemic colitis  --    CT 5/6/23    FINDINGS:   LOWER CHEST: Calcified granulomas right lower lobe. Trace right pleural fluid. Tiny nodules along the left pleural surface, unchanged. Minimal scarring or atelectasis in the bases. Small fluid-filled esophageal hiatal hernia.     HEPATOBILIARY: Cholecystectomy.     PANCREAS: Normal.     SPLEEN: Normal.     ADRENAL GLANDS: Normal.     KIDNEYS/BLADDER: Small cysts both kidneys. No stones or hydronephrosis.     BOWEL: No evidence for bowel obstruction. Normal appendix. Right lower quadrant inflammatory  process described below of glass the lateral margin of the ascending colon no wall thickening or evidence for obstruction. Sigmoid diverticulosis. Surgical   anastomosis sigmoid colon.     LYMPH NODES: Normal.     VASCULATURE: Unremarkable.     PELVIC ORGANS: Inflammatory process within the right lower quadrant along the right lateral abdominal wall and inner aspect of the lateral right iliac bone/iliacus muscle. This process extends superiorly to the inferior margin of the liver, to the   lateral margin of the right psoas muscle, and to the lateral aspect of the ascending colon. This includes a small hypodense fluid collection measuring 1.9 x 1.5 cm image 103 of series 3.  Prostatic hypertrophy.     MUSCULOSKELETAL: Hypertrophic changes lower thoracic/upper lumbar spine.                                                                      IMPRESSION:   1.  Normal appendix.  2.  Inflammatory process right lower quadrant.  This could represent a myositis arising from the lateral right iliacus muscle with a small fluid collection/abscess. Inflammatory process originating from the ascending colon also possible, however no wall   thickening or additional inflammatory changes of the colon. Recommend follow-up CT exam after therapy to assess for resolution.  3.  Prostatic hypertrophy.  4.  Esophageal hiatal hernia.  5.  Results discussed with Dr. Vandana Arguelles on 05/06/2023 at 7:30 PM.      Current Outpatient Medications   Medication Sig Dispense Refill     albuterol (PROAIR HFA/PROVENTIL HFA/VENTOLIN HFA) 108 (90 Base) MCG/ACT inhaler Inhale 2 puffs into the lungs every 4 hours as needed for shortness of breath or wheezing 18 g 11     amoxicillin-clavulanate (AUGMENTIN) 875-125 MG tablet Take 1 tablet by mouth 2 times daily for 14 days For infection 28 tablet 0     atenolol (TENORMIN) 100 MG tablet Take 1 tablet (100 mg) by mouth daily 90 tablet 3     famotidine (PEPCID) 40 MG tablet Take 1 tablet (40 mg) by mouth daily  "90 tablet 3     losartan (COZAAR) 50 MG tablet Take 1 tablet (50 mg) by mouth daily 90 tablet 3     MULTIVITAMINS OR TABS Take 1 tablet by mouth daily 100 3     Probiotic Product (PRO-BIOTIC BLEND) CAPS Take 1 capsule by mouth daily       Psyllium (METAMUCIL PO)              Review of Systems   Constitutional, HEENT, cardiovascular, pulmonary, gi and gu systems are negative, except as otherwise noted.      Objective    /70 (BP Location: Right arm, Patient Position: Sitting, Cuff Size: Adult Regular)   Pulse 56   Temp 97  F (36.1  C) (Tympanic)   Resp 16   Ht 1.645 m (5' 4.76\")   Wt 69.4 kg (153 lb)   SpO2 99%   BMI 25.65 kg/m    Body mass index is 25.65 kg/m .  Physical Exam   GENERAL APPEARANCE: healthy, alert and no distress  ABDOMEN: soft, nontender, without hepatosplenomegaly or masses and bowel sounds normal                    "

## 2023-05-16 NOTE — NURSING NOTE
Prior to immunization administration, verified patients identity using patient s name and date of birth. Please see Immunization Activity for additional information.     Screening Questionnaire for Adult Immunization    Are you sick today?   No   Do you have allergies to medications, food, a vaccine component or latex?   No   Have you ever had a serious reaction after receiving a vaccination?   No   Do you have a long-term health problem with heart, lung, kidney, or metabolic disease (e.g., diabetes), asthma, a blood disorder, no spleen, complement component deficiency, a cochlear implant, or a spinal fluid leak?  Are you on long-term aspirin therapy?   No   Do you have cancer, leukemia, HIV/AIDS, or any other immune system problem?   No   Do you have a parent, brother, or sister with an immune system problem?   No   In the past 3 months, have you taken medications that affect  your immune system, such as prednisone, other steroids, or anticancer drugs; drugs for the treatment of rheumatoid arthritis, Crohn s disease, or psoriasis; or have you had radiation treatments?   No   Have you had a seizure, or a brain or other nervous system problem?   No   During the past year, have you received a transfusion of blood or blood    products, or been given immune (gamma) globulin or antiviral drug?   No   For women: Are you pregnant or is there a chance you could become       pregnant during the next month?   No   Have you received any vaccinations in the past 4 weeks?   No     Immunization questionnaire answers were all negative.      Injection of covid given by Ave Morse MA. Patient instructed to remain in clinic for 15 minutes afterwards, and to report any adverse reactions.     Screening performed by Ave Morse MA on 5/16/2023 at 8:43 AM.

## 2023-06-03 ENCOUNTER — HOSPITAL ENCOUNTER (OUTPATIENT)
Dept: CT IMAGING | Facility: CLINIC | Age: 73
Discharge: HOME | End: 2023-06-03
Attending: INTERNAL MEDICINE | Admitting: INTERNAL MEDICINE
Payer: MEDICARE

## 2023-06-03 DIAGNOSIS — K68.12 PSOAS MUSCLE ABSCESS (H): ICD-10-CM

## 2023-06-03 PROCEDURE — 250N000011 HC RX IP 250 OP 636: Performed by: RADIOLOGY

## 2023-06-03 PROCEDURE — G1010 CDSM STANSON: HCPCS

## 2023-06-03 PROCEDURE — 74177 CT ABD & PELVIS W/CONTRAST: CPT | Mod: MG

## 2023-06-03 PROCEDURE — 250N000009 HC RX 250: Performed by: RADIOLOGY

## 2023-06-03 RX ORDER — IOPAMIDOL 755 MG/ML
68 INJECTION, SOLUTION INTRAVASCULAR ONCE
Status: COMPLETED | OUTPATIENT
Start: 2023-06-03 | End: 2023-06-03

## 2023-06-03 RX ADMIN — IOPAMIDOL 68 ML: 755 INJECTION, SOLUTION INTRAVENOUS at 09:58

## 2023-06-03 RX ADMIN — SODIUM CHLORIDE 59 ML: 9 INJECTION, SOLUTION INTRAVENOUS at 09:59

## 2023-06-06 DIAGNOSIS — K68.12 PSOAS ABSCESS, RIGHT (H): Primary | ICD-10-CM

## 2023-06-06 NOTE — RESULT ENCOUNTER NOTE
Discussed with patient     He feels better prior to starting antibiotic, but still has pain in the right side near abscess area.  Tylenol helps.  No fevers, chills.  Has occ night sweats.  Is able to exercise regularly    Referral placed to Interventional Radiology at [facility] and Dr. Bains surgeon who took his gallbladder out 1 year ago.  Both appointments should be in 1-2 weeks

## 2023-06-09 ENCOUNTER — MYC MEDICAL ADVICE (OUTPATIENT)
Dept: INTERVENTIONAL RADIOLOGY/VASCULAR | Facility: CLINIC | Age: 73
End: 2023-06-09
Payer: MEDICARE

## 2023-06-12 ENCOUNTER — HOSPITAL ENCOUNTER (OUTPATIENT)
Dept: CT IMAGING | Facility: HOSPITAL | Age: 73
Discharge: HOME | End: 2023-06-12
Attending: RADIOLOGY
Payer: MEDICARE

## 2023-06-12 PROCEDURE — 272N000431 CT ABDOMEN PERITONEUM ABSCESS ASPIRATION

## 2023-06-13 ENCOUNTER — TELEPHONE (OUTPATIENT)
Dept: INTERVENTIONAL RADIOLOGY/VASCULAR | Facility: HOSPITAL | Age: 73
End: 2023-06-13
Payer: MEDICARE

## 2023-06-13 NOTE — TELEPHONE ENCOUNTER
POST CALL    Call attempt: 1  Message left: Yes    IR nurse triage line number provided: Yes    Post call completed.   June 13, 2023 12:51 PM  Tanna Landers, RN

## 2023-06-15 ENCOUNTER — MYC MEDICAL ADVICE (OUTPATIENT)
Dept: FAMILY MEDICINE | Facility: CLINIC | Age: 73
End: 2023-06-15
Payer: MEDICARE

## 2023-06-16 NOTE — TELEPHONE ENCOUNTER
See SEAT 4a message, routing to PCP for review.    Venecia Duncan RN  [facility]

## 2023-06-21 ENCOUNTER — OFFICE VISIT (OUTPATIENT)
Dept: SURGERY | Facility: CLINIC | Age: 73
End: 2023-06-21
Attending: INTERNAL MEDICINE
Payer: MEDICARE

## 2023-06-21 VITALS
HEIGHT: 65 IN | WEIGHT: 153 LBS | SYSTOLIC BLOOD PRESSURE: 178 MMHG | TEMPERATURE: 97.5 F | HEART RATE: 60 BPM | BODY MASS INDEX: 25.49 KG/M2 | DIASTOLIC BLOOD PRESSURE: 94 MMHG

## 2023-06-21 DIAGNOSIS — K68.12 PSOAS ABSCESS, RIGHT (H): ICD-10-CM

## 2023-06-21 DIAGNOSIS — Z98.890 S/P ASPIRATION OF ABSCESS: Primary | ICD-10-CM

## 2023-06-21 PROCEDURE — 99215 OFFICE O/P EST HI 40 MIN: CPT | Performed by: SURGERY

## 2023-06-21 NOTE — LETTER
"    6/21/2023         RE: Willian Reid  [id number] [facility] [place] [id number]        Dear Colleague,    Thank you for referring your patient, Willian Reid, to the [facility]. Please see a copy of my visit note below.      Assessment & Plan   Problem List Items Addressed This Visit    None  Visit Diagnoses     S/P aspiration of abscess    -  Primary    Psoas abscess, right (H)             73-year-old male status post IR aspiration of the right abscess on the right psoas muscle  -Pt is feeling well overall.  The soreness that he was feeling is slowly improving.  -He was on 2 weeks worth of Augmentin during this entire process.  The aerobic culture did show intermediate sensitivity of the E. coli that grew to Augmentin.  -Thus since he is doing clinically well, I do not recommend additional antibiotic.  -Unsure of the source of this abscess.  It is likely originate from the intra-abdominal cavity I.e  from the previous acute cholecystitis that had purulent drainage contamination during the procedure versus his sigmoid colitis on the last colonoscopy in august 2022.  -Either way since he is doing better, I do not recommend further imaging or work-up.  However if he has recurrent symptoms, another colonoscopy to rule out recurrent colitis and perhaps a GI consult may be warranted.  -Of his questions were answered.    Review of the result(s) of each unique test - imagings and colonoscopy   40 minutes spent by me on the date of the encounter doing chart review, patient visit and documentation        BMI:   Estimated body mass index is 25.65 kg/m  as calculated from the following:    Height as of this encounter: 1.645 m (5' 4.76\").    Weight as of this encounter: 69.4 kg (153 lb).       No follow-ups on file.    RobertBenji Bains MD  [facility]    Trent Guerrero is a 73 year old, presenting for the following health issues:  RECHECK (Abscess right abdomen)    may 2023 - RLQ " "pain that made he finally came in  But noted this pain not too much after his lap yudy  last colon  8/2022 2/2 to positive cologuard (HPs and incidental colitis at sigmoid)  right psoas abscess s/p aspiration by IR june 2023 - fluid grew e coli  Was placed on abx for 2 weeks total since the abscess.    Feeling better now.   No longer having pain in the right flank; soreness improved since last week  Passing flatus; having BMs. No melena; no hematochezia.   No fevers; no chills.             Review of Systems   Constitutional, HEENT, cardiovascular, pulmonary, GI, , musculoskeletal, neuro, skin, endocrine and psych systems are negative, except as otherwise noted.     Objective    BP (!) 178/94 (BP Location: Right arm, Patient Position: Sitting, Cuff Size: Adult Regular)   Pulse 60   Temp 97.5  F (36.4  C) (Tympanic)   Ht 1.645 m (5' 4.76\")   Wt 69.4 kg (153 lb)   BMI 25.65 kg/m    Body mass index is 25.65 kg/m .  Physical Exam  Vitals reviewed.   Eyes:      Conjunctiva/sclera: Conjunctivae normal.   Abdominal:      Palpations: Abdomen is soft.      Tenderness: There is no abdominal tenderness. There is no guarding.   Musculoskeletal:         General: Normal range of motion.      Cervical back: Normal range of motion.   Skin:     General: Skin is warm.      Capillary Refill: Capillary refill takes less than 2 seconds.   Neurological:      General: No focal deficit present.   Psychiatric:         Mood and Affect: Mood normal.           EXAM: CT ABDOMEN PELVIS W CONTRAST  LOCATION: [facility]  DATE/TIME: 6/3/2023 10:13 AM CDT     INDICATION: follow up ilacus muscle abscess  COMPARISON: CT abdomen and pelvis 05/06/2023  TECHNIQUE: CT scan of the abdomen and pelvis was performed following injection of IV contrast. Multiplanar reformats were obtained. Dose reduction techniques were used.  CONTRAST: 68mL isovue 370     FINDINGS:   LOWER CHEST: Calcified granuloma right lower lobe " subsegmental atelectasis and trace pleural thickening right base. Small esophageal hiatal hernia distended with fluid.     HEPATOBILIARY: Cholecystectomy. Small amount of air in the distal common bile duct, unchanged.        PANCREAS: Normal.     SPLEEN: Calcified granulomas.     ADRENAL GLANDS: Normal.     KIDNEYS/BLADDER: Small cysts both kidneys. No hydronephrosis.     BOWEL: No evidence for bowel obstruction. Scattered diverticula throughout the colon, without evidence for diverticulitis. Surgical anastomosis sigmoid colon.     LYMPH NODES: Normal.     VASCULATURE: Unremarkable.     PELVIC ORGANS: Normal.     MUSCULOSKELETAL: Minimal change in the inflammatory process within the right lower quadrant along the right lateral abdominal wall and right iliacus muscle. This includes a small fluid pocket measuring 2.0 x 1.5 cm on image 104 series 3. This process   extends to the lateral aspect of the ascending colon as seen previously. No evidence for bowel obstruction.                                                                       IMPRESSION:   1.  No significant change inflammatory process right lower quadrant, including a small localized fluid collection/possible abscess.  2.  No evidence for bowel obstruction.  3.  Esophageal hiatal hernia.  4.  Prostatic hypertrophy.    EXAM:  1. PERCUTANEOUS ASPIRATION RIGHT ABDOMINAL COLLECTION  2. CT GUIDANCE  3. CONSCIOUS SEDATION     LOCATION: [facility]  DATE/TIME: 6/12/2023 10:37 AM CDT     INDICATION: Right abdomen collection.  TECHNIQUE: Dose reduction techniques were used.     PROCEDURE: Informed consent obtained. Site marked. Prior images reviewed. Required items made available. Patient identity confirmed verbally and with arm band. Patient reevaluated immediately before administering sedation. Universal protocol was   followed. Time out performed. The site was prepped and draped in sterile fashion. 5 mL of 1% lidocaine was infused into  the local soft tissues. Using standard technique and under direct CT guidance, a 5 German Yueh catheter was used to aspirate this   collection.     SPECIMEN: 2 mL of brownish fluid was aspirated and sent to lab for cultures and Gram stain.     BLOOD LOSS: Minimal.     The patient tolerated the procedure well. No immediate complications.     SEDATION: Versed 2 mg. Fentanyl 50 mcg. The procedure was performed with administration intravenous conscious sedation with appropriate preoperative, intraoperative, and postoperative evaluation.     11 minutes of supervised face to face conscious sedation time was provided by a radiology nurse under my direct supervision.                                                                      IMPRESSION:     1.  Successful CT-guided aspiration of right abdominal collection. The collection was not large enough for drain placement. Sequela of retained non-radiopaque gallstone not excluded.            Again, thank you for allowing me to participate in the care of your patient.        Sincerely,        Joya Bains MD

## 2023-06-21 NOTE — NURSING NOTE
"Initial BP (!) 178/94 (BP Location: Right arm, Patient Position: Sitting, Cuff Size: Adult Regular)   Pulse 60   Temp 97.5  F (36.4  C) (Tympanic)   Ht 1.645 m (5' 4.76\")   Wt 69.4 kg (153 lb)   BMI 25.65 kg/m   Estimated body mass index is 25.65 kg/m  as calculated from the following:    Height as of this encounter: 1.645 m (5' 4.76\").    Weight as of this encounter: 69.4 kg (153 lb). .    Marlin Meraz MA    "

## 2023-06-21 NOTE — PROGRESS NOTES
"  Assessment & Plan   Problem List Items Addressed This Visit    None  Visit Diagnoses     S/P aspiration of abscess    -  Primary    Psoas abscess, right (H)             73-year-old male status post IR aspiration of the right abscess on the right psoas muscle  -Pt is feeling well overall.  The soreness that he was feeling is slowly improving.  -He was on 2 weeks worth of Augmentin during this entire process.  The aerobic culture did show intermediate sensitivity of the E. coli that grew to Augmentin.  -Thus since he is doing clinically well, I do not recommend additional antibiotic.  -Unsure of the source of this abscess.  It is likely originate from the intra-abdominal cavity I.e  from the previous acute cholecystitis that had purulent drainage contamination during the procedure versus his sigmoid colitis on the last colonoscopy in august 2022.  -Either way since he is doing better, I do not recommend further imaging or work-up.  However if he has recurrent symptoms, another colonoscopy to rule out recurrent colitis and perhaps a GI consult may be warranted.  -Of his questions were answered.    Review of the result(s) of each unique test - imagings and colonoscopy   40 minutes spent by me on the date of the encounter doing chart review, patient visit and documentation        BMI:   Estimated body mass index is 25.65 kg/m  as calculated from the following:    Height as of this encounter: 1.645 m (5' 4.76\").    Weight as of this encounter: 69.4 kg (153 lb).       No follow-ups on file.    [facility] MD Nara  [facility] is a 73 year old, presenting for the following health issues:  RECHECK (Abscess right abdomen)    may 2023 - RLQ pain that made he finally came in  But noted this pain not too much after his lap yudy  last colon  8/2022 2/2 to positive cologuard (HPs and incidental colitis at sigmoid)  right psoas abscess s/p aspiration by IR june 2023 - fluid grew e coli  Was " "placed on abx for 2 weeks total since the abscess.    Feeling better now.   No longer having pain in the right flank; soreness improved since last week  Passing flatus; having BMs. No melena; no hematochezia.   No fevers; no chills.             Review of Systems   Constitutional, HEENT, cardiovascular, pulmonary, GI, , musculoskeletal, neuro, skin, endocrine and psych systems are negative, except as otherwise noted.      Objective    BP (!) 178/94 (BP Location: Right arm, Patient Position: Sitting, Cuff Size: Adult Regular)   Pulse 60   Temp 97.5  F (36.4  C) (Tympanic)   Ht 1.645 m (5' 4.76\")   Wt 69.4 kg (153 lb)   BMI 25.65 kg/m    Body mass index is 25.65 kg/m .  Physical Exam  Vitals reviewed.   Eyes:      Conjunctiva/sclera: Conjunctivae normal.   Abdominal:      Palpations: Abdomen is soft.      Tenderness: There is no abdominal tenderness. There is no guarding.   Musculoskeletal:         General: Normal range of motion.      Cervical back: Normal range of motion.   Skin:     General: Skin is warm.      Capillary Refill: Capillary refill takes less than 2 seconds.   Neurological:      General: No focal deficit present.   Psychiatric:         Mood and Affect: Mood normal.            EXAM: CT ABDOMEN PELVIS W CONTRAST  LOCATION: [facility]  DATE/TIME: 6/3/2023 10:13 AM CDT     INDICATION: follow up ilacus muscle abscess  COMPARISON: CT abdomen and pelvis 05/06/2023  TECHNIQUE: CT scan of the abdomen and pelvis was performed following injection of IV contrast. Multiplanar reformats were obtained. Dose reduction techniques were used.  CONTRAST: 68mL isovue 370     FINDINGS:   LOWER CHEST: Calcified granuloma right lower lobe subsegmental atelectasis and trace pleural thickening right base. Small esophageal hiatal hernia distended with fluid.     HEPATOBILIARY: Cholecystectomy. Small amount of air in the distal common bile duct, unchanged.        PANCREAS: Normal.     SPLEEN: " Calcified granulomas.     ADRENAL GLANDS: Normal.     KIDNEYS/BLADDER: Small cysts both kidneys. No hydronephrosis.     BOWEL: No evidence for bowel obstruction. Scattered diverticula throughout the colon, without evidence for diverticulitis. Surgical anastomosis sigmoid colon.     LYMPH NODES: Normal.     VASCULATURE: Unremarkable.     PELVIC ORGANS: Normal.     MUSCULOSKELETAL: Minimal change in the inflammatory process within the right lower quadrant along the right lateral abdominal wall and right iliacus muscle. This includes a small fluid pocket measuring 2.0 x 1.5 cm on image 104 series 3. This process   extends to the lateral aspect of the ascending colon as seen previously. No evidence for bowel obstruction.                                                                       IMPRESSION:   1.  No significant change inflammatory process right lower quadrant, including a small localized fluid collection/possible abscess.  2.  No evidence for bowel obstruction.  3.  Esophageal hiatal hernia.  4.  Prostatic hypertrophy.    EXAM:  1. PERCUTANEOUS ASPIRATION RIGHT ABDOMINAL COLLECTION  2. CT GUIDANCE  3. CONSCIOUS SEDATION     LOCATION: [facility]  DATE/TIME: 6/12/2023 10:37 AM CDT     INDICATION: Right abdomen collection.  TECHNIQUE: Dose reduction techniques were used.     PROCEDURE: Informed consent obtained. Site marked. Prior images reviewed. Required items made available. Patient identity confirmed verbally and with arm band. Patient reevaluated immediately before administering sedation. Universal protocol was   followed. Time out performed. The site was prepped and draped in sterile fashion. 5 mL of 1% lidocaine was infused into the local soft tissues. Using standard technique and under direct CT guidance, a 5 [place] Yueh catheter was used to aspirate this   collection.     SPECIMEN: 2 mL of brownish fluid was aspirated and sent to lab for cultures and Gram stain.     BLOOD LOSS:  Minimal.     The patient tolerated the procedure well. No immediate complications.     SEDATION: Versed 2 mg. Fentanyl 50 mcg. The procedure was performed with administration intravenous conscious sedation with appropriate preoperative, intraoperative, and postoperative evaluation.     11 minutes of supervised face to face conscious sedation time was provided by a radiology nurse under my direct supervision.                                                                      IMPRESSION:     1.  Successful CT-guided aspiration of right abdominal collection. The collection was not large enough for drain placement. Sequela of retained non-radiopaque gallstone not excluded.

## 2023-06-27 ENCOUNTER — TELEPHONE (OUTPATIENT)
Dept: FAMILY MEDICINE | Facility: CLINIC | Age: 73
End: 2023-06-27
Payer: MEDICARE

## 2023-06-27 NOTE — TELEPHONE ENCOUNTER
Patient Quality Outreach    Patient is due for the following:   Hypertension -  BP check    Next Steps:   Schedule a nurse only visit for BP check    Type of outreach:    Sent letter.    Next Steps:  Reach out within 90 days via Letter.    Max number of attempts reached: Yes. Will try again in 90 days if patient still on fail list.    Questions for provider review:    None           Ave Morse MA  Chart routed to .

## 2023-07-08 ENCOUNTER — APPOINTMENT (OUTPATIENT)
Dept: CT IMAGING | Facility: HOSPITAL | Age: 73
DRG: 982 | End: 2023-07-08
Attending: FAMILY MEDICINE
Payer: MEDICARE

## 2023-07-08 ENCOUNTER — HOSPITAL ENCOUNTER (INPATIENT)
Facility: HOSPITAL | Age: 73
LOS: 5 days | Discharge: HOME | DRG: 982 | End: 2023-07-13
Attending: FAMILY MEDICINE | Admitting: INTERNAL MEDICINE
Payer: MEDICARE

## 2023-07-08 DIAGNOSIS — R33.9 URINARY RETENTION: Primary | ICD-10-CM

## 2023-07-08 DIAGNOSIS — L02.211 ABDOMINAL WALL ABSCESS: ICD-10-CM

## 2023-07-08 LAB
ALBUMIN SERPL BCG-MCNC: 4.3 G/DL (ref 3.5–5.2)
ALP SERPL-CCNC: 109 U/L (ref 40–129)
ALT SERPL W P-5'-P-CCNC: 13 U/L (ref 0–70)
ANION GAP SERPL CALCULATED.3IONS-SCNC: 10 MMOL/L (ref 7–15)
AST SERPL W P-5'-P-CCNC: 27 U/L (ref 0–45)
BASOPHILS # BLD AUTO: 0.1 10E3/UL (ref 0–0.2)
BASOPHILS NFR BLD AUTO: 1 %
BILIRUB DIRECT SERPL-MCNC: <0.2 MG/DL (ref 0–0.3)
BILIRUB SERPL-MCNC: 0.4 MG/DL
BUN SERPL-MCNC: 17 MG/DL (ref 8–23)
CALCIUM SERPL-MCNC: 10.5 MG/DL (ref 8.8–10.2)
CHLORIDE SERPL-SCNC: 103 MMOL/L (ref 98–107)
CREAT SERPL-MCNC: 1.07 MG/DL (ref 0.67–1.17)
DEPRECATED HCO3 PLAS-SCNC: 25 MMOL/L (ref 22–29)
EOSINOPHIL # BLD AUTO: 0.1 10E3/UL (ref 0–0.7)
EOSINOPHIL NFR BLD AUTO: 1 %
ERYTHROCYTE [DISTWIDTH] IN BLOOD BY AUTOMATED COUNT: 16.1 % (ref 10–15)
GFR SERPL CREATININE-BSD FRML MDRD: 72 ML/MIN/1.73M2
GLUCOSE SERPL-MCNC: 94 MG/DL (ref 70–99)
HCT VFR BLD AUTO: 37.3 % (ref 40–53)
HGB BLD-MCNC: 11.7 G/DL (ref 13.3–17.7)
IMM GRANULOCYTES # BLD: 0.1 10E3/UL
IMM GRANULOCYTES NFR BLD: 1 %
LACTATE SERPL-SCNC: 0.8 MMOL/L (ref 0.7–2)
LYMPHOCYTES # BLD AUTO: 1.9 10E3/UL (ref 0.8–5.3)
LYMPHOCYTES NFR BLD AUTO: 15 %
MCH RBC QN AUTO: 28.1 PG (ref 26.5–33)
MCHC RBC AUTO-ENTMCNC: 31.4 G/DL (ref 31.5–36.5)
MCV RBC AUTO: 89 FL (ref 78–100)
MONOCYTES # BLD AUTO: 0.8 10E3/UL (ref 0–1.3)
MONOCYTES NFR BLD AUTO: 6 %
NEUTROPHILS # BLD AUTO: 9.7 10E3/UL (ref 1.6–8.3)
NEUTROPHILS NFR BLD AUTO: 76 %
NRBC # BLD AUTO: 0 10E3/UL
NRBC BLD AUTO-RTO: 0 /100
PLAT MORPH BLD: ABNORMAL
PLATELET # BLD AUTO: 318 10E3/UL (ref 150–450)
POTASSIUM SERPL-SCNC: 4.9 MMOL/L (ref 3.4–5.3)
PROT SERPL-MCNC: 9.3 G/DL (ref 6.4–8.3)
RBC # BLD AUTO: 4.17 10E6/UL (ref 4.4–5.9)
RBC MORPH BLD: ABNORMAL
SODIUM SERPL-SCNC: 138 MMOL/L (ref 136–145)
WBC # BLD AUTO: 12.6 10E3/UL (ref 4–11)

## 2023-07-08 PROCEDURE — 250N000013 HC RX MED GY IP 250 OP 250 PS 637: Performed by: INTERNAL MEDICINE

## 2023-07-08 PROCEDURE — 83605 ASSAY OF LACTIC ACID: CPT | Performed by: FAMILY MEDICINE

## 2023-07-08 PROCEDURE — 99285 EMERGENCY DEPT VISIT HI MDM: CPT | Mod: 25

## 2023-07-08 PROCEDURE — 36415 COLL VENOUS BLD VENIPUNCTURE: CPT | Performed by: FAMILY MEDICINE

## 2023-07-08 PROCEDURE — 250N000011 HC RX IP 250 OP 636: Mod: JZ | Performed by: FAMILY MEDICINE

## 2023-07-08 PROCEDURE — 99223 1ST HOSP IP/OBS HIGH 75: CPT | Performed by: INTERNAL MEDICINE

## 2023-07-08 PROCEDURE — 120N000001 HC R&B MED SURG/OB

## 2023-07-08 PROCEDURE — 85025 COMPLETE CBC W/AUTO DIFF WBC: CPT | Performed by: FAMILY MEDICINE

## 2023-07-08 PROCEDURE — 74177 CT ABD & PELVIS W/CONTRAST: CPT | Mod: MA

## 2023-07-08 PROCEDURE — 82248 BILIRUBIN DIRECT: CPT | Performed by: FAMILY MEDICINE

## 2023-07-08 PROCEDURE — 80053 COMPREHEN METABOLIC PANEL: CPT | Performed by: FAMILY MEDICINE

## 2023-07-08 PROCEDURE — 87040 BLOOD CULTURE FOR BACTERIA: CPT | Performed by: FAMILY MEDICINE

## 2023-07-08 RX ORDER — ACETAMINOPHEN 500 MG
1000 TABLET ORAL 3 TIMES DAILY
Status: DISCONTINUED | OUTPATIENT
Start: 2023-07-08 | End: 2023-07-13 | Stop reason: HOSPADM

## 2023-07-08 RX ORDER — ALBUTEROL SULFATE 90 UG/1
2 AEROSOL, METERED RESPIRATORY (INHALATION) EVERY 4 HOURS PRN
Status: DISCONTINUED | OUTPATIENT
Start: 2023-07-08 | End: 2023-07-13 | Stop reason: HOSPADM

## 2023-07-08 RX ORDER — ACETAMINOPHEN 325 MG/1
650 TABLET ORAL EVERY 6 HOURS PRN
Status: DISCONTINUED | OUTPATIENT
Start: 2023-07-08 | End: 2023-07-10

## 2023-07-08 RX ORDER — ACETAMINOPHEN 650 MG/1
650 SUPPOSITORY RECTAL EVERY 6 HOURS PRN
Status: DISCONTINUED | OUTPATIENT
Start: 2023-07-08 | End: 2023-07-13 | Stop reason: HOSPADM

## 2023-07-08 RX ORDER — IOPAMIDOL 755 MG/ML
75 INJECTION, SOLUTION INTRAVASCULAR ONCE
Status: COMPLETED | OUTPATIENT
Start: 2023-07-08 | End: 2023-07-08

## 2023-07-08 RX ORDER — HYDROMORPHONE HCL IN WATER/PF 6 MG/30 ML
.2-.4 PATIENT CONTROLLED ANALGESIA SYRINGE INTRAVENOUS
Status: DISCONTINUED | OUTPATIENT
Start: 2023-07-08 | End: 2023-07-10 | Stop reason: ALTCHOICE

## 2023-07-08 RX ORDER — CALCIUM CARBONATE 500 MG/1
1000 TABLET, CHEWABLE ORAL 4 TIMES DAILY PRN
Status: DISCONTINUED | OUTPATIENT
Start: 2023-07-08 | End: 2023-07-13 | Stop reason: HOSPADM

## 2023-07-08 RX ORDER — FAMOTIDINE 20 MG/1
40 TABLET, FILM COATED ORAL DAILY
Status: DISCONTINUED | OUTPATIENT
Start: 2023-07-09 | End: 2023-07-13 | Stop reason: HOSPADM

## 2023-07-08 RX ORDER — AMOXICILLIN 250 MG
1 CAPSULE ORAL 2 TIMES DAILY PRN
Status: DISCONTINUED | OUTPATIENT
Start: 2023-07-08 | End: 2023-07-13 | Stop reason: HOSPADM

## 2023-07-08 RX ORDER — LOSARTAN POTASSIUM 50 MG/1
50 TABLET ORAL DAILY
Status: DISCONTINUED | OUTPATIENT
Start: 2023-07-09 | End: 2023-07-13 | Stop reason: HOSPADM

## 2023-07-08 RX ORDER — HYDRALAZINE HYDROCHLORIDE 20 MG/ML
10 INJECTION INTRAMUSCULAR; INTRAVENOUS EVERY 4 HOURS PRN
Status: DISCONTINUED | OUTPATIENT
Start: 2023-07-08 | End: 2023-07-13 | Stop reason: HOSPADM

## 2023-07-08 RX ORDER — LANOLIN ALCOHOL/MO/W.PET/CERES
3 CREAM (GRAM) TOPICAL
Status: DISCONTINUED | OUTPATIENT
Start: 2023-07-08 | End: 2023-07-13 | Stop reason: HOSPADM

## 2023-07-08 RX ORDER — ATENOLOL 25 MG/1
100 TABLET ORAL DAILY
Status: DISCONTINUED | OUTPATIENT
Start: 2023-07-09 | End: 2023-07-13 | Stop reason: HOSPADM

## 2023-07-08 RX ORDER — PIPERACILLIN SODIUM, TAZOBACTAM SODIUM 3; .375 G/15ML; G/15ML
3.38 INJECTION, POWDER, LYOPHILIZED, FOR SOLUTION INTRAVENOUS ONCE
Status: COMPLETED | OUTPATIENT
Start: 2023-07-08 | End: 2023-07-08

## 2023-07-08 RX ORDER — AMOXICILLIN 250 MG
2 CAPSULE ORAL 2 TIMES DAILY PRN
Status: DISCONTINUED | OUTPATIENT
Start: 2023-07-08 | End: 2023-07-13 | Stop reason: HOSPADM

## 2023-07-08 RX ORDER — ONDANSETRON 2 MG/ML
4 INJECTION INTRAMUSCULAR; INTRAVENOUS EVERY 6 HOURS PRN
Status: DISCONTINUED | OUTPATIENT
Start: 2023-07-08 | End: 2023-07-10

## 2023-07-08 RX ORDER — ONDANSETRON 4 MG/1
4 TABLET, ORALLY DISINTEGRATING ORAL EVERY 6 HOURS PRN
Status: DISCONTINUED | OUTPATIENT
Start: 2023-07-08 | End: 2023-07-10

## 2023-07-08 RX ORDER — ACETAMINOPHEN 500 MG
1000 TABLET ORAL 3 TIMES DAILY
COMMUNITY
End: 2024-01-03

## 2023-07-08 RX ADMIN — PIPERACILLIN AND TAZOBACTAM 3.38 G: 3; .375 INJECTION, POWDER, LYOPHILIZED, FOR SOLUTION INTRAVENOUS at 14:25

## 2023-07-08 RX ADMIN — IOPAMIDOL 75 ML: 755 INJECTION, SOLUTION INTRAVENOUS at 13:27

## 2023-07-08 RX ADMIN — ACETAMINOPHEN 1000 MG: 500 TABLET ORAL at 17:03

## 2023-07-08 ASSESSMENT — ENCOUNTER SYMPTOMS
ABDOMINAL DISTENTION: 1
ABDOMINAL PAIN: 1
FEVER: 0
NAUSEA: 0
FATIGUE: 0
VOMITING: 0

## 2023-07-08 ASSESSMENT — ACTIVITIES OF DAILY LIVING (ADL)
ADLS_ACUITY_SCORE: 35
ADLS_ACUITY_SCORE: 33
ADLS_ACUITY_SCORE: 35

## 2023-07-08 NOTE — PHARMACY-ADMISSION MEDICATION HISTORY
Pharmacist Admission Medication History    Admission medication history is complete. The information provided in this note is only as accurate as the sources available at the time of the update.    Medication reconciliation/reorder completed by provider prior to medication history? No    Information Source(s): Patient and CareEverywhere/SureScripts via in-person    Changes made to PTA medication list:    Added: Tylenol    Deleted: None    Changed: Metamucil    Allergies reviewed with patient and updates made in EHR: yes    Medication History Completed By: Cleo Kowalski [facility] 7/8/2023 2:39 PM    Prior to Admission medications    Medication Sig Last Dose Taking? Auth Provider Long Term End Date   acetaminophen (TYLENOL) 500 MG tablet Take 1,000 mg by mouth 3 times daily 7/7/2023 Yes Unknown, Entered By History     albuterol (PROAIR HFA/PROVENTIL HFA/VENTOLIN HFA) 108 (90 Base) MCG/ACT inhaler Inhale 2 puffs into the lungs every 4 hours as needed for shortness of breath or wheezing Unknown Yes Amy Chandler, DO Yes    atenolol (TENORMIN) 100 MG tablet Take 1 tablet (100 mg) by mouth daily 7/8/2023 Yes Amy Chandler, DO Yes    famotidine (PEPCID) 40 MG tablet Take 1 tablet (40 mg) by mouth daily 7/8/2023 Yes Amy Chandler DO     losartan (COZAAR) 50 MG tablet Take 1 tablet (50 mg) by mouth daily 7/8/2023 Yes Amy Chandler, DO Yes    MULTIVITAMINS OR TABS Take 1 tablet by mouth daily 7/8/2023 Yes Kirk Oliver MD     Probiotic Product (PRO-BIOTIC BLEND) CAPS Take 1 capsule by mouth daily 7/8/2023 Yes Reported, Patient     psyllium (METAMUCIL/KONSYL) 58.6 % powder Take 2 teaspoonful by mouth daily (before supper) 7/7/2023 Yes Unknown, Entered By History

## 2023-07-08 NOTE — H&P
[facility]    History and Physical - Hospitalist Service       Date of Admission:  7/8/2023    Assessment & Plan   Principal Problem:    Abdominal wall abscess       Willian Reid is a  73 year old male admitted on 7/8/2023. He has a history of HTN, asthma, GERD, and prior cholecystectomy with complication of retained stone and pancreatitis as well as right psoas abscess that grew E. coli 6/12/2023 who presents with increasing right-sided abdominal pain and is found to have new abdominal wall abscess.        Abdominal wall abscess:  History of right psoas abscess that was aspirated and grew E. coli 6/12/2023. He was treated with 2 weeks worth of Augmentin. The aerobic culture did show intermediate sensitivity to Augmentin and patient was not given additional abx.   Patient is off antibiotics prior to admission.  CT on admission shows 3 x 2.6 x 2.7 cm abscess in the right anterior abdominal wall  WBC 12.6  -- Start IV Zosyn (previously E. Coli was susceptible)  --ER physician spoke with general surgery who did not recommend any surgical intervention and recommended IV antibiotics and possible ID consult.  -- plan US guided drainage with cultures as oreded 7/9/23 at 0930  -- Consult ID to assist with further management  -- Dilaudid and Tylenol for pain control  -- trend WBC in AM  -- follow up blood culture results      History of hypertension: Continue home atenolol and losartan      History of uncomplicated asthma: Continue home albuterol inhaler      History of GERD: Continue home Pepcid      Mild hypercalcemia: stable from previous. Calcium is barely elevated if corrected for albumin of 4.3  -- hold home multivitamin  -- check ionized calcium with AM labs     Diet: Regular Diet Adult    DVT Prophylaxis: Pneumatic Compression Devices  Rogers Catheter: Not present  Lines: None     Cardiac Monitoring: None  Code Status:   Full Code    Clinically Significant Risk Factors Present on Admission  "          # Hypercalcemia: Highest Ca = 10.5 mg/dL in last 2 days, will monitor as appropriate        # Hypertension: Noted on problem list      # Overweight: Estimated body mass index is 26.54 kg/m  as calculated from the following:    Height as of this encounter: 1.626 m (5' 4\").    Weight as of this encounter: 70.1 kg (154 lb 9.6 oz).            Disposition Plan      Expected Discharge Date: 07/10/2023                  Rupesh Goldstein DO  Hospitalist Service  [facility]  Securely message with [employer] (more info)  Text page via [facility] Paging/Directory     ______________________________________________________________________    Chief Complaint   Abdominal pain    History is obtained from the patient    History of Present Illness    73 year old male admitted on 7/8/2023. He has a history of HTN, asthma, GERD, and prior cholecystectomy with complication of retained stone and pancreatitis as well as right psoas abscess that grew E. coli 6/12/2023 who presents with increasing right-sided abdominal pain and is found to have new abdominal wall abscess.  Patient endorsees RLQ pain and denies any associated symptoms.          Past Medical History    Past Medical History:   Diagnosis Date     Arthritis      Diverticulitis of colon (without mention of hemorrhage)(562.11) 01/01/1980     Hypertension      Uncomplicated asthma        Past Surgical History   Past Surgical History:   Procedure Laterality Date     ABDOMEN SURGERY      9 inch colon removed due to abscess     BIOPSY       COLONOSCOPY       COLONOSCOPY N/A 08/30/2022    Procedure: COLONOSCOPY, FLEXIBLE, WITH LESION REMOVAL USING SNARE;  Surgeon: Solitario Winter MD;  Location: [facility]     FLEXIBLE SIGMOIDOSCOPY  03/02/2005     Flexible sigmoidoscopy probably should be changed to a colonoscopy at his next visit to get further and to help him with sedation, so in 2010 he should have a colonoscopy and then every 10 years afterwards     " GI SURGERY       HERNIA REPAIR       LAPAROSCOPIC CHOLECYSTECTOMY N/A 06/09/2022    Procedure: CHOLECYSTECTOMY, LAPAROSCOPIC;  Surgeon: Joya Bains MD;  Location: [facility]     ORTHOPEDIC SURGERY         Prior to Admission Medications   Prior to Admission Medications   Prescriptions Last Dose Informant Patient Reported? Taking?   MULTIVITAMINS OR TABS 7/8/2023 Self Yes Yes   Sig: Take 1 tablet by mouth daily   Probiotic Product (PRO-BIOTIC BLEND) CAPS 7/8/2023 Self Yes Yes   Sig: Take 1 capsule by mouth daily   acetaminophen (TYLENOL) 500 MG tablet 7/7/2023  Yes Yes   Sig: Take 1,000 mg by mouth 3 times daily   albuterol (PROAIR HFA/PROVENTIL HFA/VENTOLIN HFA) 108 (90 Base) MCG/ACT inhaler Unknown  No Yes   Sig: Inhale 2 puffs into the lungs every 4 hours as needed for shortness of breath or wheezing   atenolol (TENORMIN) 100 MG tablet 7/8/2023  No Yes   Sig: Take 1 tablet (100 mg) by mouth daily   famotidine (PEPCID) 40 MG tablet 7/8/2023  No Yes   Sig: Take 1 tablet (40 mg) by mouth daily   losartan (COZAAR) 50 MG tablet 7/8/2023  No Yes   Sig: Take 1 tablet (50 mg) by mouth daily   psyllium (METAMUCIL/KONSYL) 58.6 % powder 7/7/2023  Yes Yes   Sig: Take 2 teaspoonful by mouth daily (before supper)      Facility-Administered Medications: None        Physical Exam   Vital Signs: Temp: 97.7  F (36.5  C) Temp src: Oral BP: (!) 144/65 Pulse: 54   Resp: 16 SpO2: 99 %      Weight: 154 lbs 9.6 oz    General Appearance: In no acute distress  RESPIRATORY: Respirations nonlabored  CARDIOVASCULAR:  No le edema bilat.  ABDOMEN: soft and tender to light palpation RLQ  NEUROLOGIC: No focal arm or leg  weakness, speech is clear         Medical Decision Making       >75 MINUTES SPENT BY ME on the date of service doing chart review, history, exam, documentation & further activities per the note.      Data

## 2023-07-08 NOTE — ED TRIAGE NOTES
Patient presents here for evaluation pain to his right upper abdominal area. He has a history of cholecystectomy. He was found to have an abdominal abcess. He recently had a needle biopsy on June 12 and was found to have E.Coli. He is here today due to increasing pain.

## 2023-07-08 NOTE — ED PROVIDER NOTES
EMERGENCY DEPARTMENT ENCOUNTER      NAME: Willian Reid  AGE: 73 year old male  YOB: 1950  MRN: [id number]  EVALUATION DATE & TIME: 7/8/2023 10:58 AM    PCP: Amy Chandler    ED PROVIDER: Abdullahi Blandon M.D.    Chief Complaint   Patient presents with     Abdominal Pain       FINAL IMPRESSION:  1. Abdominal wall abscess        ED COURSE & MEDICAL DECISION MAKING:    Pertinent Labs & Imaging studies independently interpreted by me. (See chart for details)  11:04 AM  Patient seen and examined, reviewed prior clinic note from May 5 when patient initially presented with right upper quadrant pain, subsequently had a CT scan as an outpatient on May 6 that diagnosed a psoas abscess which initially was treated with antibiotics but subsequently had CT drainage per procedure note of June 12, 2023.  Differential diagnosis includes but not limited to gastritis, cholecystitis, pancreatitis, colitis, enteritis, diverticulitis, urinary tract infection, myocardial infarction, pneumonia appendicitis, AAA, cholelithiasis, ischemic bowel.  Patient presents with right-sided abdominal pain and swelling, concern given recent psoas abscess.  On exam there is an approximately 8 cm x 12 cm mass palpable adjacent to the abdominal wall on the right side, no overlying inflammatory changes or induration.  Concern for possible abscess, could represent hematoma or seroma as well.  Labs and CT scan are ordered.  12:44 PM Labs independently interpreted by me demonstrate leukocytosis although patient has had an elevated white blood cell count related to ongoing infection for a couple of months, basic panel is reassuring, lipase and hepatic panel are normal.  1:51 PM CT scan independently interpreted by me demonstrates some inflammatory changes in the right lower quadrant with a small fluid collection within the abdominal wall musculature.  Will discuss with general surgery, based on prior culture results will start Zosyn  and anticipate possible admission.  2:15 PM Spoke with Dr. Gee, general surgery regarding patient plan of care.  Agrees with plan for admission for IV antibiotics and further evaluation for source of seeding given multiple intramuscular abscesses.  Blood cultures are ordered and discussed finding with patient.  2:54 PM Spoke with Dr. Goldstein, hospitalist who accepts the patient for admission.     At the conclusion of the encounter I discussed the results of all of the tests and the disposition. The questions were answered. The patient or family acknowledged understanding and was agreeable with the care plan.     Medical Decision Making    History:    Supplemental history from: Family Member/Significant Other    External Record(s) reviewed: Documented in chart, if applicable.    Work Up:    Chart documentation includes differential considered and any EKGs or imaging independently interpreted by provider, where specified.    In additional to work up documented, I considered the following work up: Documented in chart, if applicable.    External consultation:    Discussion of management with another provider: Documented in chart, if applicable    Complicating factors:    Care impacted by chronic illness: Hypertension, chronic abdominal infection    Care affected by social determinants of health: N/A    Disposition considerations: Admit.    MEDICATIONS GIVEN IN THE EMERGENCY:  Medications   HYDROmorphone (DILAUDID) injection 0.2-0.4 mg (has no administration in time range)   hydrALAZINE (APRESOLINE) injection 10 mg (has no administration in time range)   acetaminophen (TYLENOL) tablet 1,000 mg (1,000 mg Oral $Given 7/8/23 3233)   albuterol (PROVENTIL HFA/VENTOLIN HFA) inhaler (has no administration in time range)   atenolol (TENORMIN) tablet 100 mg (has no administration in time range)   famotidine (PEPCID) tablet 40 mg (has no administration in time range)   losartan (COZAAR) tablet 50 mg (has no administration in  "time range)   acetaminophen (TYLENOL) tablet 650 mg (has no administration in time range)     Or   acetaminophen (TYLENOL) Suppository 650 mg (has no administration in time range)   melatonin tablet 3 mg (has no administration in time range)   senna-docusate (SENOKOT-S/PERICOLACE) 8.6-50 MG per tablet 1 tablet (has no administration in time range)     Or   senna-docusate (SENOKOT-S/PERICOLACE) 8.6-50 MG per tablet 2 tablet (has no administration in time range)   ondansetron (ZOFRAN ODT) ODT tab 4 mg (has no administration in time range)     Or   ondansetron (ZOFRAN) injection 4 mg (has no administration in time range)   calcium carbonate (TUMS) chewable tablet 1,000 mg (has no administration in time range)   iopamidol (ISOVUE-370) solution 75 mL (75 mLs Intravenous $Given 7/8/23 1327)   piperacillin-tazobactam (ZOSYN) 3.375 g vial to attach to  mL bag (0 g Intravenous Stopped 7/8/23 6574)       NEW PRESCRIPTIONS STARTED AT TODAY'S ER VISIT  New Prescriptions    No medications on file       =================================================================    HPI    Patient information was obtained from: patient       Willian Reid is a 73 year old male with a pertinent history of hypertension, diverticulitis, pancreatitis, s/p cholecystectomy, colitis, partial colectomy, who presents to this ED via walk in for evaluation of abdominal pain.     Per chart review: patient with history of cholecystectomy on 6/9/22. Presented on 7/10/22 with recurring abdominal pain and lipase 24598 / total bilirubin 4.3 / alk phos 294 /  / . CT abdomen & pelvis 7/10/22 demonstrates \"uncomplicated acute pancreatitis.\" MRCP 7/11/22 demonstrates \"acute pancreatitis without complications, not significantly changed since CT yesterday [7/10/22]. No biliary ductal dilatation or choledocholithiasis.\" Triglycerides within normal limits (79).  Cause of acute pancreatitis appears to be due to biliary obstruction either from " stone that has since passed, or sludge. Patient then developed psoas muscle abscess on 5/06/23. He was treated with antibiotics but subsequently had CT drainage per procedure note of June 12, 2023    Patient reports a week ago he had a follow up visit with his surgeon. Since then, the patient went to a resort and had to leave a day early due to increased abdominal distension and abdominal pain. The patient notes the right sided pain is worse with deep breathing and movement. No nausea or vomiting. The patient has not had any recent antibiotic treatment. Last treatment was the end of May. Patient otherwise is in his normal state of health. He denies any fever, urinary symptoms, or any other complaints at this time. The patient does not taking any blood thinners.     REVIEW OF SYSTEMS   Review of Systems   Constitutional: Negative for fatigue and fever.   Gastrointestinal: Positive for abdominal distention and abdominal pain. Negative for nausea and vomiting.   Genitourinary: Negative.    All other systems reviewed and are negative.     All other systems reviewed and negative    PAST MEDICAL HISTORY:  Past Medical History:   Diagnosis Date     Arthritis      Diverticulitis of colon (without mention of hemorrhage)(562.11) 01/01/1980     Hypertension      Uncomplicated asthma        PAST SURGICAL HISTORY:  Past Surgical History:   Procedure Laterality Date     ABDOMEN SURGERY      9 inch colon removed due to abscess     BIOPSY       COLONOSCOPY       COLONOSCOPY N/A 08/30/2022    Procedure: COLONOSCOPY, FLEXIBLE, WITH LESION REMOVAL USING SNARE;  Surgeon: Solitario Winter MD;  [facility]     FLEXIBLE SIGMOIDOSCOPY  03/02/2005     Flexible sigmoidoscopy probably should be changed to a colonoscopy at his next visit to get further and to help him with sedation, so in 2010 he should have a colonoscopy and then every 10 years afterwards     GI SURGERY       HERNIA REPAIR       LAPAROSCOPIC CHOLECYSTECTOMY N/A  06/09/2022    Procedure: CHOLECYSTECTOMY, LAPAROSCOPIC;  Surgeon: Joya Bains MD;  Location: [facility]     ORTHOPEDIC SURGERY         CURRENT MEDICATIONS:    Current Facility-Administered Medications   Medication     acetaminophen (TYLENOL) tablet 650 mg    Or     acetaminophen (TYLENOL) Suppository 650 mg     acetaminophen (TYLENOL) tablet 1,000 mg     albuterol (PROVENTIL HFA/VENTOLIN HFA) inhaler     [START ON 7/9/2023] atenolol (TENORMIN) tablet 100 mg     calcium carbonate (TUMS) chewable tablet 1,000 mg     [START ON 7/9/2023] famotidine (PEPCID) tablet 40 mg     hydrALAZINE (APRESOLINE) injection 10 mg     HYDROmorphone (DILAUDID) injection 0.2-0.4 mg     [START ON 7/9/2023] losartan (COZAAR) tablet 50 mg     melatonin tablet 3 mg     ondansetron (ZOFRAN ODT) ODT tab 4 mg    Or     ondansetron (ZOFRAN) injection 4 mg     senna-docusate (SENOKOT-S/PERICOLACE) 8.6-50 MG per tablet 1 tablet    Or     senna-docusate (SENOKOT-S/PERICOLACE) 8.6-50 MG per tablet 2 tablet     Current Outpatient Medications   Medication     acetaminophen (TYLENOL) 500 MG tablet     albuterol (PROAIR HFA/PROVENTIL HFA/VENTOLIN HFA) 108 (90 Base) MCG/ACT inhaler     atenolol (TENORMIN) 100 MG tablet     famotidine (PEPCID) 40 MG tablet     losartan (COZAAR) 50 MG tablet     MULTIVITAMINS OR TABS     Probiotic Product (PRO-BIOTIC BLEND) CAPS     psyllium (METAMUCIL/KONSYL) 58.6 % powder       ALLERGIES:  Allergies   Allergen Reactions     Tetracycline Rash       FAMILY HISTORY:  Family History   Problem Relation Age of Onset     Osteoporosis Mother      Cancer Mother         facial skin cancers removed     Arthritis Mother         OA     Other Cancer Mother      Heart Disease Father      Diabetes Father      Eye Disorder Father      C.A.D. Father      Coronary Artery Disease Father      Hyperlipidemia Father      Cancer Maternal Grandmother      Cancer Paternal Grandmother      Diabetes Paternal Grandmother      Heart Disease Paternal  Grandfather      Coronary Artery Disease Paternal Grandfather      Hypertension Maternal Uncle      Cerebrovascular Disease Maternal Uncle      Colon Cancer Cousin 76     Diabetes Other      Diabetes Other      Asthma Other      Asthma Nephew      Cancer - colorectal No family hx of      Prostate Cancer No family hx of      Breast Cancer No family hx of        SOCIAL HISTORY:   Social History     Socioeconomic History     Marital status:      Spouse name: None     Number of children: None     Years of education: None     Highest education level: None   Tobacco Use     Smoking status: Former     Packs/day: 0.50     Years: 4.00     Pack years: 2.00     Types: Cigarettes     Start date: 1967     Quit date: 1971     Years since quittin.5     Smokeless tobacco: Never   Vaping Use     Vaping Use: Never used   Substance and Sexual Activity     Alcohol use: No     Drug use: No     Sexual activity: Yes     Partners: Female     Birth control/protection: Condom   Other Topics Concern     Parent/sibling w/ CABG, MI or angioplasty before 65F 55M? No     Social Determinants of Health     Financial Resource Strain: Low Risk  (11/10/2021)    Overall Financial Resource Strain (CARDIA)      Difficulty of Paying Living Expenses: Not hard at all   Food Insecurity: No Food Insecurity (11/10/2021)    Hunger Vital Sign      Worried About Running Out of Food in the Last Year: Never true      Ran Out of Food in the Last Year: Never true   Transportation Needs: No Transportation Needs (11/10/2021)    PRAPARE - Transportation      Lack of Transportation (Medical): No      Lack of Transportation (Non-Medical): No   Physical Activity: Sufficiently Active (11/10/2021)    Exercise Vital Sign      Days of Exercise per Week: 7 days      Minutes of Exercise per Session: 60 min   Stress: Stress Concern Present (11/10/2021)    [employer] [place] [employer] - Occupational Stress Questionnaire      Feeling of Stress : Very  "much   Social Connections: Socially Integrated (11/10/2021)    Social Connection and Isolation Panel [NHANES]      Frequency of Communication with Friends and Family: More than three times a week      Frequency of Social Gatherings with Friends and Family: Three times a week      Attends Anabaptist Services: More than 4 times per year      Active Member of Clubs or Organizations: Yes      Marital Status:    Housing Stability: Low Risk  (11/10/2021)    Housing Stability Vital Sign      Unable to Pay for Housing in the Last Year: No      Number of Places Lived in the Last Year: 1      Unstable Housing in the Last Year: No       VITALS:  BP (!) 154/68 (BP Location: Left arm)   Pulse 53   Temp 97.7  F (36.5  C) (Oral)   Resp 16   Ht 1.626 m (5' 4\")   Wt 70.1 kg (154 lb 9.6 oz)   SpO2 99%   BMI 26.54 kg/m      PHYSICAL EXAM:  Physical Exam  Vitals and nursing note reviewed.   Constitutional:       Appearance: Normal appearance.   HENT:      Head: Normocephalic and atraumatic.      Right Ear: External ear normal.      Left Ear: External ear normal.      Nose: Nose normal.      Mouth/Throat:      Mouth: Mucous membranes are moist.   Eyes:      Extraocular Movements: Extraocular movements intact.      Conjunctiva/sclera: Conjunctivae normal.      Pupils: Pupils are equal, round, and reactive to light.   Cardiovascular:      Rate and Rhythm: Normal rate and regular rhythm.   Pulmonary:      Effort: Pulmonary effort is normal.      Breath sounds: Normal breath sounds. No wheezing or rales.   Abdominal:      General: Abdomen is flat. There is no distension.      Tenderness: There is no guarding.      Comments: Area of firmness to right sided abdomen with deep palpation - 8cm x 12cm. No overlying warmth or redness.    Musculoskeletal:         General: Normal range of motion.      Cervical back: Normal range of motion and neck supple.      Right lower leg: No edema.      Left lower leg: No edema.   Lymphadenopathy: "      Cervical: No cervical adenopathy.   Skin:     General: Skin is warm and dry.   Neurological:      General: No focal deficit present.      Mental Status: He is alert and oriented to person, place, and time. Mental status is at baseline.      Comments: No gross focal neurologic deficits   Psychiatric:         Mood and Affect: Mood normal.         Behavior: Behavior normal.         Thought Content: Thought content normal.          LAB:  All pertinent labs reviewed and interpreted.  Results for orders placed or performed during the hospital encounter of 07/08/23   CT Abdomen Pelvis w Contrast    Impression    IMPRESSION:   1.  Small fluid and inflammatory changes noted within the right anterior abdominal wall measuring approximately 3 x 2.6 x 2.7 cm concerning for phlegmon/abscess. This probably is too small for drainage. Additional inflammatory change noted along the   right paracolic gutter and right iliopsoas muscle which contains small fluid and small foci of air. No drainable fluid within this area.   Basic metabolic panel   Result Value Ref Range    Sodium 138 136 - 145 mmol/L    Potassium 4.9 3.4 - 5.3 mmol/L    Chloride 103 98 - 107 mmol/L    Carbon Dioxide (CO2) 25 22 - 29 mmol/L    Anion Gap 10 7 - 15 mmol/L    Urea Nitrogen 17.0 8.0 - 23.0 mg/dL    Creatinine 1.07 0.67 - 1.17 mg/dL    Calcium 10.5 (H) 8.8 - 10.2 mg/dL    Glucose 94 70 - 99 mg/dL    GFR Estimate 72 >60 mL/min/1.73m2   Hepatic function panel   Result Value Ref Range    Protein Total 9.3 (H) 6.4 - 8.3 g/dL    Albumin 4.3 3.5 - 5.2 g/dL    Bilirubin Total 0.4 <=1.2 mg/dL    Alkaline Phosphatase 109 40 - 129 U/L    AST 27 0 - 45 U/L    ALT 13 0 - 70 U/L    Bilirubin Direct <0.20 0.00 - 0.30 mg/dL   Lactic acid whole blood   Result Value Ref Range    Lactic Acid 0.8 0.7 - 2.0 mmol/L   CBC with platelets and differential   Result Value Ref Range    WBC Count 12.6 (H) 4.0 - 11.0 10e3/uL    RBC Count 4.17 (L) 4.40 - 5.90 10e6/uL    Hemoglobin  11.7 (L) 13.3 - 17.7 g/dL    Hematocrit 37.3 (L) 40.0 - 53.0 %    MCV 89 78 - 100 fL    MCH 28.1 26.5 - 33.0 pg    MCHC 31.4 (L) 31.5 - 36.5 g/dL    RDW 16.1 (H) 10.0 - 15.0 %    Platelet Count 318 150 - 450 10e3/uL    % Neutrophils 76 %    % Lymphocytes 15 %    % Monocytes 6 %    % Eosinophils 1 %    % Basophils 1 %    % Immature Granulocytes 1 %    NRBCs per 100 WBC 0 <1 /100    Absolute Neutrophils 9.7 (H) 1.6 - 8.3 10e3/uL    Absolute Lymphocytes 1.9 0.8 - 5.3 10e3/uL    Absolute Monocytes 0.8 0.0 - 1.3 10e3/uL    Absolute Eosinophils 0.1 0.0 - 0.7 10e3/uL    Absolute Basophils 0.1 0.0 - 0.2 10e3/uL    Absolute Immature Granulocytes 0.1 <=0.4 10e3/uL    Absolute NRBCs 0.0 10e3/uL   RBC and Platelet Morphology   Result Value Ref Range    Platelet Assessment Platelets Clumped (A) Automated Count Confirmed. Platelet morphology is normal.    RBC Morphology Confirmed RBC Indices        RADIOLOGY:  Reviewed all pertinent imaging. Please see official radiology report.  CT Abdomen Pelvis w Contrast   Final Result   IMPRESSION:    1.  Small fluid and inflammatory changes noted within the right anterior abdominal wall measuring approximately 3 x 2.6 x 2.7 cm concerning for phlegmon/abscess. This probably is too small for drainage. Additional inflammatory change noted along the    right paracolic gutter and right iliopsoas muscle which contains small fluid and small foci of air. No drainable fluid within this area.      US Drainage Seroma/Hematoma Abscess/Cyst    (Results Pending)       I, Klaudia Isaac, am serving as a scribe to document services personally performed by Dr. Blandon based on my observation and the provider's statements to me. I, Abdullahi Blandon MD attest that Klaudia Isaac is acting in a scribe capacity, has observed my performance of the services and has documented them in accordance with my direction.    Abdullahi Blandon M.D.  Emergency Medicine  [facility]  [facility] EMERGENCY DEPARTMENT  [address] [place] [id number]  [phone]  Dept: [phone]     Abdullahi Blandon MD  07/08/23 5499

## 2023-07-09 ENCOUNTER — APPOINTMENT (OUTPATIENT)
Dept: ULTRASOUND IMAGING | Facility: HOSPITAL | Age: 73
DRG: 982 | End: 2023-07-09
Attending: INTERNAL MEDICINE
Payer: MEDICARE

## 2023-07-09 ENCOUNTER — ANESTHESIA EVENT (OUTPATIENT)
Dept: SURGERY | Facility: HOSPITAL | Age: 73
DRG: 982 | End: 2023-07-09
Payer: MEDICARE

## 2023-07-09 LAB
CALCIUM, IONIZED MEASURED: 1.17 MMOL/L (ref 1.11–1.3)
ERYTHROCYTE [DISTWIDTH] IN BLOOD BY AUTOMATED COUNT: 16.1 % (ref 10–15)
GRAM STAIN RESULT: NORMAL
GRAM STAIN RESULT: NORMAL
HCT VFR BLD AUTO: 36.2 % (ref 40–53)
HGB BLD-MCNC: 11.3 G/DL (ref 13.3–17.7)
ION CA PH 7.4: 1.15 MMOL/L (ref 1.11–1.3)
MCH RBC QN AUTO: 28 PG (ref 26.5–33)
MCHC RBC AUTO-ENTMCNC: 31.2 G/DL (ref 31.5–36.5)
MCV RBC AUTO: 90 FL (ref 78–100)
PH: 7.38 (ref 7.35–7.45)
PLATELET # BLD AUTO: 336 10E3/UL (ref 150–450)
RBC # BLD AUTO: 4.04 10E6/UL (ref 4.4–5.9)
WBC # BLD AUTO: 11 10E3/UL (ref 4–11)

## 2023-07-09 PROCEDURE — 87075 CULTR BACTERIA EXCEPT BLOOD: CPT | Performed by: INTERNAL MEDICINE

## 2023-07-09 PROCEDURE — 87077 CULTURE AEROBIC IDENTIFY: CPT | Performed by: INTERNAL MEDICINE

## 2023-07-09 PROCEDURE — 250N000011 HC RX IP 250 OP 636: Mod: JZ

## 2023-07-09 PROCEDURE — 250N000013 HC RX MED GY IP 250 OP 250 PS 637: Performed by: INTERNAL MEDICINE

## 2023-07-09 PROCEDURE — 36415 COLL VENOUS BLD VENIPUNCTURE: CPT | Performed by: INTERNAL MEDICINE

## 2023-07-09 PROCEDURE — 120N000001 HC R&B MED SURG/OB

## 2023-07-09 PROCEDURE — 87102 FUNGUS ISOLATION CULTURE: CPT | Performed by: INTERNAL MEDICINE

## 2023-07-09 PROCEDURE — 250N000013 HC RX MED GY IP 250 OP 250 PS 637: Performed by: SURGERY

## 2023-07-09 PROCEDURE — 99222 1ST HOSP IP/OBS MODERATE 55: CPT | Performed by: INTERNAL MEDICINE

## 2023-07-09 PROCEDURE — 85027 COMPLETE CBC AUTOMATED: CPT | Performed by: INTERNAL MEDICINE

## 2023-07-09 PROCEDURE — 87070 CULTURE OTHR SPECIMN AEROBIC: CPT | Performed by: INTERNAL MEDICINE

## 2023-07-09 PROCEDURE — 272N000710 US ASPIRATION OF SEROMA/HEMATOMA/ABSCESS/CYST

## 2023-07-09 PROCEDURE — 0K9K3ZZ DRAINAGE OF RIGHT ABDOMEN MUSCLE, PERCUTANEOUS APPROACH: ICD-10-PCS | Performed by: RADIOLOGY

## 2023-07-09 PROCEDURE — 82330 ASSAY OF CALCIUM: CPT | Performed by: INTERNAL MEDICINE

## 2023-07-09 PROCEDURE — 99231 SBSQ HOSP IP/OBS SF/LOW 25: CPT | Performed by: INTERNAL MEDICINE

## 2023-07-09 PROCEDURE — 87205 SMEAR GRAM STAIN: CPT | Performed by: INTERNAL MEDICINE

## 2023-07-09 RX ORDER — NALOXONE HYDROCHLORIDE 0.4 MG/ML
0.2 INJECTION, SOLUTION INTRAMUSCULAR; INTRAVENOUS; SUBCUTANEOUS
Status: DISCONTINUED | OUTPATIENT
Start: 2023-07-09 | End: 2023-07-13 | Stop reason: HOSPADM

## 2023-07-09 RX ORDER — POLYETHYLENE GLYCOL 3350 17 G/17G
238 POWDER, FOR SOLUTION ORAL ONCE
Status: COMPLETED | OUTPATIENT
Start: 2023-07-09 | End: 2023-07-09

## 2023-07-09 RX ORDER — PIPERACILLIN SODIUM, TAZOBACTAM SODIUM 3; .375 G/15ML; G/15ML
3.38 INJECTION, POWDER, LYOPHILIZED, FOR SOLUTION INTRAVENOUS EVERY 8 HOURS
Status: DISCONTINUED | OUTPATIENT
Start: 2023-07-09 | End: 2023-07-09

## 2023-07-09 RX ORDER — PIPERACILLIN SODIUM, TAZOBACTAM SODIUM 3; .375 G/15ML; G/15ML
3.38 INJECTION, POWDER, LYOPHILIZED, FOR SOLUTION INTRAVENOUS EVERY 8 HOURS
Status: DISPENSED | OUTPATIENT
Start: 2023-07-09 | End: 2023-07-12

## 2023-07-09 RX ORDER — PIPERACILLIN SODIUM, TAZOBACTAM SODIUM 3; .375 G/15ML; G/15ML
3.38 INJECTION, POWDER, LYOPHILIZED, FOR SOLUTION INTRAVENOUS ONCE
Status: COMPLETED | OUTPATIENT
Start: 2023-07-09 | End: 2023-07-09

## 2023-07-09 RX ORDER — NALOXONE HYDROCHLORIDE 0.4 MG/ML
0.4 INJECTION, SOLUTION INTRAMUSCULAR; INTRAVENOUS; SUBCUTANEOUS
Status: DISCONTINUED | OUTPATIENT
Start: 2023-07-09 | End: 2023-07-13 | Stop reason: HOSPADM

## 2023-07-09 RX ADMIN — FAMOTIDINE 40 MG: 20 TABLET ORAL at 07:43

## 2023-07-09 RX ADMIN — PIPERACILLIN AND TAZOBACTAM 3.38 G: 3; .375 INJECTION, POWDER, LYOPHILIZED, FOR SOLUTION INTRAVENOUS at 05:41

## 2023-07-09 RX ADMIN — POLYETHYLENE GLYCOL 3350 238 G: 17 POWDER, FOR SOLUTION ORAL at 17:52

## 2023-07-09 RX ADMIN — LOSARTAN POTASSIUM 50 MG: 50 TABLET, FILM COATED ORAL at 07:43

## 2023-07-09 RX ADMIN — PIPERACILLIN AND TAZOBACTAM 3.38 G: 3; .375 INJECTION, POWDER, LYOPHILIZED, FOR SOLUTION INTRAVENOUS at 20:27

## 2023-07-09 RX ADMIN — ACETAMINOPHEN 1000 MG: 500 TABLET ORAL at 20:26

## 2023-07-09 RX ADMIN — PIPERACILLIN AND TAZOBACTAM 3.38 G: 3; .375 INJECTION, POWDER, LYOPHILIZED, FOR SOLUTION INTRAVENOUS at 12:11

## 2023-07-09 RX ADMIN — ACETAMINOPHEN 1000 MG: 500 TABLET ORAL at 14:08

## 2023-07-09 RX ADMIN — ATENOLOL 100 MG: 25 TABLET ORAL at 07:42

## 2023-07-09 RX ADMIN — ACETAMINOPHEN 1000 MG: 500 TABLET ORAL at 07:42

## 2023-07-09 RX ADMIN — ACETAMINOPHEN 650 MG: 325 TABLET, FILM COATED ORAL at 03:10

## 2023-07-09 ASSESSMENT — ACTIVITIES OF DAILY LIVING (ADL)
ADLS_ACUITY_SCORE: 20
ADLS_ACUITY_SCORE: 35
ADLS_ACUITY_SCORE: 20
ADLS_ACUITY_SCORE: 35

## 2023-07-09 NOTE — CONSULTS
GENERAL SURGERY CONSULTATION    Willian Reid  [facility]  Medical Record #:  [id number]  YOB: 1950  Age:  73 year old     Date of Consultation: 7/9/2023    Reason for Consultation: Abdominal wall mass    Willian Reid is a 73 year old male who presents with a 1 year history of illness status post laparoscopic cholecystectomy and gallstone pancreatitis at an outside hospital [facility].  He had his gallbladder removed and subsequently has had longstanding history of problems not feeling well for the last 10 months.  He presented himself to the emergency room again and came directly to [facility] emergency room on 7/8/2023.  He has a history of cholecystectomy and the gallstone pancreatitis and a distant history of left inguinal hernia repair with mesh as well as distant history of colectomy for diverticulitis without recurrence of diverticulitis since that time.  He has had a mass in his right mid abdominal wall.  This has been previously aspirated in early June of this year and it was aspirated again today.  The mass persists.  He has not been feeling well off and on since last year.  [place] he had progression of symptoms of the abdominal wall and then subsequently presented himself to the emergency room.    PHH:    Past Medical History:   Diagnosis Date     Arthritis      Diverticulitis of colon (without mention of hemorrhage)(562.11) 01/01/1980     Hypertension      Uncomplicated asthma         Past Surgical History:   Procedure Laterality Date     ABDOMEN SURGERY      9 inch colon removed due to abscess     BIOPSY       COLONOSCOPY       COLONOSCOPY N/A 08/30/2022    Procedure: COLONOSCOPY, FLEXIBLE, WITH LESION REMOVAL USING SNARE;  Surgeon: Solitario Winter MD;  Location: [facility]     FLEXIBLE SIGMOIDOSCOPY  03/02/2005     Flexible sigmoidoscopy probably should be changed to a colonoscopy at his next visit to get further and to  help him with sedation, so in 2010 he should have a colonoscopy and then every 10 years afterwards     GI SURGERY       HERNIA REPAIR       LAPAROSCOPIC CHOLECYSTECTOMY N/A 06/09/2022    Procedure: CHOLECYSTECTOMY, LAPAROSCOPIC;  Surgeon: Joya Bains MD;  Location: [facility]     ORTHOPEDIC SURGERY         ALLERGIES:  Tetracycline    MEDS:    Current Facility-Administered Medications:      acetaminophen (TYLENOL) tablet 650 mg, 650 mg, Oral, Q6H PRN, 650 mg at 07/09/23 0310 **OR** acetaminophen (TYLENOL) Suppository 650 mg, 650 mg, Rectal, Q6H PRN, Rupesh Goldstein, DO     acetaminophen (TYLENOL) tablet 1,000 mg, 1,000 mg, Oral, TID, Rupesh Goldstein A, DO, 1,000 mg at 07/09/23 1408     albuterol (PROVENTIL HFA/VENTOLIN HFA) inhaler, 2 puff, Inhalation, Q4H PRN, Rupesh Goldstein A, DO     atenolol (TENORMIN) tablet 100 mg, 100 mg, Oral, Daily, Rupesh Goldstein, DO, 100 mg at 07/09/23 0742     calcium carbonate (TUMS) chewable tablet 1,000 mg, 1,000 mg, Oral, 4x Daily PRN, Rupesh Goldstein DO     famotidine (PEPCID) tablet 40 mg, 40 mg, Oral, Daily, Norris Goldsteininic A, DO, 40 mg at 07/09/23 0743     hydrALAZINE (APRESOLINE) injection 10 mg, 10 mg, Intravenous, Q4H PRN, Rupesh Goldstein DO     HYDROmorphone (DILAUDID) injection 0.2-0.4 mg, 0.2-0.4 mg, Intravenous, Q2H PRN, Rupesh Goldstein, DO     losartan (COZAAR) tablet 50 mg, 50 mg, Oral, Daily, Rupesh Goldstein, DO, 50 mg at 07/09/23 0743     melatonin tablet 3 mg, 3 mg, Oral, At Bedtime PRN, Rupesh Goldstein, DO     naloxone (NARCAN) injection 0.2 mg, 0.2 mg, Intravenous, Q2 Min PRN **OR** naloxone (NARCAN) injection 0.4 mg, 0.4 mg, Intravenous, Q2 Min PRN **OR** naloxone (NARCAN) injection 0.2 mg, 0.2 mg, Intramuscular, Q2 Min PRN **OR** naloxone (NARCAN) injection 0.4 mg, 0.4 mg, Intramuscular, Q2 Min PRN, Jany Mcdonald MD     ondansetron (ZOFRAN ODT) ODT tab 4 mg, 4 mg, Oral, Q6H PRN **OR** ondansetron (ZOFRAN) injection 4 mg, 4 mg,  Intravenous, Q6H PRN, Triston Rupesh A, DO     piperacillin-tazobactam (ZOSYN) 3.375 g vial to attach to  mL bag, 3.375 g, Intravenous, Q8H, Leilani Burgos MD, 3.375 g at 07/09/23 1211     senna-docusate (SENOKOT-S/PERICOLACE) 8.6-50 MG per tablet 1 tablet, 1 tablet, Oral, BID PRN **OR** senna-docusate (SENOKOT-S/PERICOLACE) 8.6-50 MG per tablet 2 tablet, 2 tablet, Oral, BID PRN, Triston Rupesh A, DO    SOCIAL HABITS:    History   Smoking Status     Former     Packs/day: 0.50     Years: 4.00     Types: Cigarettes     Start date: 1/1/1967     Quit date: 1/1/1971   Smokeless Tobacco     Never     Social History    Substance and Sexual Activity      Alcohol use: No      History   Drug Use No       FAMILY HISTORY:    Family History   Problem Relation Age of Onset     Osteoporosis Mother      Cancer Mother         facial skin cancers removed     Arthritis Mother         OA     Other Cancer Mother      Heart Disease Father      Diabetes Father      Eye Disorder Father      C.A.D. Father      Coronary Artery Disease Father      Hyperlipidemia Father      Cancer Maternal Grandmother      Cancer Paternal Grandmother      Diabetes Paternal Grandmother      Heart Disease Paternal Grandfather      Coronary Artery Disease Paternal Grandfather      Hypertension Maternal Uncle      Cerebrovascular Disease Maternal Uncle      Colon Cancer Cousin 76     Diabetes Other      Diabetes Other      Asthma Other      Asthma Nephew      Cancer - colorectal No family hx of      Prostate Cancer No family hx of      Breast Cancer No family hx of        REVIEW OF SYSTEMS: Patient has hypertension which is well controlled on current medications.  Meds reviewed.  Distant history of diverticulitis noted and he has gastroesophageal reflux disease minimal and well controlled.  No history of colon personal or family colon carcinoma.  No other malignancies.  He is without significant weight loss has had very mild weight loss recently.   "He has been functioning quite well.  He is an avid exerciser doing a stationary bike and walking significantly.  He is  and he is a non-smoker nondrinker.  He is a  at at a [employer] in [place] for extended length of time.    PE:    /56 (BP Location: Left arm)   Pulse 50   Temp 97.6  F (36.4  C) (Oral)   Resp 18   Ht 1.626 m (5' 4\")   Wt 67.4 kg (148 lb 11.2 oz)   SpO2 97%   BMI 25.52 kg/m      HEENT: Normal  Chest: Normal  Lungs: Clear good respiratory effort  Heart: Heart rate 80 and regular  Abd: Abdomen with mass in the right mid abdomen just below trocar sites from laparoscopic cholecystectomy.  Band-Aid in place secondary to needle aspiration today.  Ext: Normal  Vascular: Normal carotids without bruit lower extremities well perfused femoral popliteal pedal pulses easily palpable.  No edema.  No varicose veins.  Been without bruit.    LABS:    Recent Labs   Lab 07/08/23  1201      CO2 25   BUN 17.0      Recent Labs   Lab 07/09/23  0732   WBC 11.0   HGB 11.3*   HCT 36.2*         Recent Labs   Lab 07/08/23  1201   ALKPHOS 109   ALT 13   AST 27     No results for input(s): AMYLASE in the last 168 hours.  No results for input(s): INR in the last 168 hours.    XRAYS: All radiographic studies since last year reviewed.  In summary this reveals presence of an inflammatory mass of the right abdominal wall consistent with abscess after laparoscopic cholecystectomy    ASSESSMENT: Clinical scenario most consistent with retained stone material or primary abscess after laparoscopic cholecystectomy and now with a chronic abscess.    PLAN: Patient needs debridement of the abdominal wall.  We will place him on the schedule for tomorrow.  Risk benefits and complications of surgical intervention and potential extent of surgical intervention were reviewed with the patient and his wife and a brother who is in attendance at this time.  He desires to proceed with surgery.  Thanks for allow me " to assist in Mr. Reid's care    Kody ware md  [facility]

## 2023-07-09 NOTE — ED NOTES
Introduced self to patient. Patient with no complaints or needs at this time. Patient did not sleep well last night due to roommate. Patient with minimal to no abdominal pain this AM. Made aware of plan for ultrasound guided aspiration this AM. US called and state they anticipate this will be done around 0930. Patient up to bathroom independently when nurse left room.

## 2023-07-09 NOTE — CONSULTS
"[facility]  General ID Service Consult      Patient: Willian Reid  YOB: 1950, MRN: [id number]  Date of Admission:  7/8/2023  Date of Consult: 07/09/2023  Consult Requested by: Jany Mcdonald MD  Admission Diagnosis: Abdominal wall abscess [L02.211]      ID Assessment & Plan     Right psoas abscess aspiration, E. Coli, 6/12/23  Suspected to be previous cholecystitis or sigmoid colitis  Recent antibiotic use Augmentin  Lap choly 6/9/2022  Seen by surgeon , outpt, was doing okay    Now anterior abdominal wall, right side, abscess pocket with retained biliary stone, potentially fistula,    PLAN  Continue IV Zosyn  Consult general surgery  They need to make a decision about extracting the stone    Jolly Watts MD  [facility]  ______________________________________________________________________      History of Present Illness   Per dr Gil  Willian Reid is a 73 year old male with h/o asthma, GERD, and prior cholecystectomy with complication of retained stone and pancreatitis as well as right psoas abscess that grew E. coli 6/12/2023 who presents with increasing right-sided abdominal pain and is found to have new abdominal wall abscess.   Per chart review: patient with history of cholecystectomy on 6/9/22. Presented on 7/10/22 with recurring abdominal pain and lipase 26279 / total bilirubin 4.3 / alk phos 294 /  / . CT abdomen & pelvis 7/10/22 demonstrates \"uncomplicated acute pancreatitis.\" MRCP 7/11/22 demonstrates \"acute pancreatitis without complications, not significantly changed since CT yesterday [7/10/22]. No biliary ductal dilatation or choledocholithiasis.\" Triglycerides within normal limits (79).  Cause of acute pancreatitis appears to be due to biliary obstruction either from stone that has since passed, or sludge. Patient then developed psoas muscle abscess on 5/06/23. He was treated with antibiotics but " subsequently had CT drainage per procedure note of June 12, 2023    He reports no fever chills or other systemic signs.  He said this anterior abdominal wall swelling might have been there for the last several weeks not excessively tender but it has been growing in size especially more recently  All 12 systems reviewed, negative except for the above.    He underwent aspiration:  The 07/08/2023 CT abdomen and pelvis reviewed. Preliminary ultrasound demonstrates a complex fluid collection within the substance of the muscular right lower quadrant anterior abdominal wall measures about 6.7 x 3.4 x 2.3 cm and in its   inferior aspect contains a 0.6 cm echogenic structure that could be a biliary stone fragment. Informed consent obtained. Time out performed. The site was prepped and draped in sterile fashion. 10 mL of 1% lidocaine was infused into the local soft   tissues. Under direct ultrasound guidance, a 5 [employer] catheter was placed into the fluid pocket and only thick tan purulent appearing fluid could be aspirated. This was sent for cultures.       Radiology personally reviewed  CT  1.  Small fluid and inflammatory changes noted within the right anterior abdominal wall measuring approximately 3 x 2.6 x 2.7 cm concerning for phlegmon/abscess. This probably is too small for drainage. Additional inflammatory change noted along the   right paracolic gutter and right iliopsoas muscle which     Review of Systems   The 10 point Review of Systems is negative other than noted in the HPI or here.     Past Medical History    Past Medical History:   Diagnosis Date     Arthritis      Diverticulitis of colon (without mention of hemorrhage)(562.11) 01/01/1980     Hypertension      Uncomplicated asthma        Past Surgical History   Past Surgical History:   Procedure Laterality Date     ABDOMEN SURGERY      9 inch colon removed due to abscess     BIOPSY       COLONOSCOPY       COLONOSCOPY N/A 08/30/2022    Procedure:  COLONOSCOPY, FLEXIBLE, WITH LESION REMOVAL USING SNARE;  Surgeon: Solitario Winter MD;  Location: [facility]     FLEXIBLE SIGMOIDOSCOPY  2005     Flexible sigmoidoscopy probably should be changed to a colonoscopy at his next visit to get further and to help him with sedation, so in  he should have a colonoscopy and then every 10 years afterwards     GI SURGERY       HERNIA REPAIR       LAPAROSCOPIC CHOLECYSTECTOMY N/A 2022    Procedure: CHOLECYSTECTOMY, LAPAROSCOPIC;  Surgeon: Joya Bains MD;  Location: [facility]     ORTHOPEDIC SURGERY         Social History   Social History     Tobacco Use     Smoking status: Former     Packs/day: 0.50     Years: 4.00     Pack years: 2.00     Types: Cigarettes     Start date: 1967     Quit date: 1971     Years since quittin.5     Smokeless tobacco: Never   Vaping Use     Vaping Use: Never used   Substance Use Topics     Alcohol use: No     Drug use: No       Family History   I have reviewed this patient's family history and updated it with pertinent information if needed.  Family History   Problem Relation Age of Onset     Osteoporosis Mother      Cancer Mother         facial skin cancers removed     Arthritis Mother         OA     Other Cancer Mother      Heart Disease Father      Diabetes Father      Eye Disorder Father      C.A.D. Father      Coronary Artery Disease Father      Hyperlipidemia Father      Cancer Maternal Grandmother      Cancer Paternal Grandmother      Diabetes Paternal Grandmother      Heart Disease Paternal Grandfather      Coronary Artery Disease Paternal Grandfather      Hypertension Maternal Uncle      Cerebrovascular Disease Maternal Uncle      Colon Cancer Cousin 76     Diabetes Other      Diabetes Other      Asthma Other      Asthma Nephew      Cancer - colorectal No family hx of      Prostate Cancer No family hx of      Breast Cancer No family hx of        Medications   I have reviewed this patient's current  medications    Allergies   Allergies   Allergen Reactions     Tetracycline Rash       Physical Exam   Vital Signs: Temp: 98  F (36.7  C) Temp src: Oral BP: (!) 142/65 Pulse: (!) 49   Resp: 16 SpO2: 96 % O2 Device: None (Room air)    Weight: 154 lbs 9.6 oz    Gen. appearance nontoxic  Eyes no conjunctivitis or icterus  Neck no stiffness or neck vein distention, no LN  Heart  No edema  Lungs breathing comfortably  Abdomen soft not tender  Extremities no synovitis, trace edema  Skin  no rash or emboli  Neurologic alert oriented no focal deficits        Data   Inflammatory Markers No lab results found.     Hematology Studies   Recent Labs   Lab Test 07/09/23  0732 07/08/23  1201 06/12/23  0826 05/05/23  0938 09/14/22  0707 08/16/22  1012 07/15/22  1202   WBC 11.0 12.6*  --  11.4* 10.8 11.2* 15.4*   HGB 11.3* 11.7* 11.5* 12.4* 12.6* 11.7* 10.7*   MCV 90 89  --  89 96 96 97    318 388 417 265 296 269       Metabolic Studies   Recent Labs   Lab Test 07/08/23  1201 05/05/23  0938 07/15/22  1202 07/13/22  0832 07/12/22  0452    140 138 139 139   POTASSIUM 4.9 5.1 4.2 3.6 3.9   CHLORIDE 103 103 108 106 109   CO2 25 29 26 27 26   BUN 17.0 15.0 11 10 15   CR 1.07 1.09 0.75 0.88 0.91   GFRESTIMATED 72 71 >90 90 88       Hepatic Studies    Recent Labs   Lab Test 07/08/23  1201 05/05/23  0938 08/16/22  1012 07/15/22  1202 07/13/22  0832 07/12/22  0452   BILITOTAL 0.4 0.4 0.4 0.8 1.3 1.6*   ALKPHOS 109 95 101 177* 168* 199*   ALBUMIN 4.3 4.3 3.5 2.8* 2.7* 2.5*   AST 27 18 21 28 33 57*   ALT 13 13 24 84* 126* 179*       Most Recent 6 Bacteria Isolates From Any Culture (See EPIC Reports for Culture Details):No lab results found.    Urine Studies  No lab results found.    Vancomycin Levels  No lab results found.    Invalid input(s): VANCO    Hepatitis B Testing No lab results found.  Hepatitis C Testing   No results found for: HCVAB, HQTG, HCGENO, HCPCR, HQTRNA, HEPRNA  HIVTesting No lab results found.    Respiratory  Virus Testing    No results found for: RS, FLUAG  COVID-19 Antibody Results, Testing for Immunity         No data to display            COVID-19 PCR Results        6/9/2022    13:20 7/10/2022    19:17   COVID-19 PCR Results   SARS CoV2 PCR Negative  Negative

## 2023-07-09 NOTE — SIGNIFICANT EVENT
Significant Event Note    Time of event: 5:32 AM July 9, 2023    Description of event:  Notified by RN that patient received initial dose of Zosyn at 2:00 PM yesterday, no further doses were ordered, however H&P notes ongoing Zosyn for treatment of abscess until ID can evaluate.  No other antibiotic was ordered.     Plan:  - Placed order for Zosyn based on H&P     Leilani Burgos MD  [facility] Medicine Residency, PGY-3

## 2023-07-09 NOTE — ED NOTES
"[facility] ED Handoff Report    ED Chief Complaint: Right sided abdominal pain    ED Diagnosis:  (L02.211) Abdominal wall abscess  Comment: Re-occurring abscesses since cholecystectomy a year ago  Plan: IV Zosyn and Infectious disease following       PMH:    Past Medical History:   Diagnosis Date    Arthritis     Diverticulitis of colon (without mention of hemorrhage)(562.11) 01/01/1980    Hypertension     Uncomplicated asthma         Code Status:  Full Code     Falls Risk: No Band: Not applicable    Current Living Situation/Residence: lives in a house     Elimination Status: Continent: Yes    Activity Level: Independent    Patients Preferred Language:  English     Needed: No    Vital Signs:  BP (!) 142/65   Pulse (!) 49   Temp 98  F (36.7  C) (Oral)   Resp 16   Ht 1.626 m (5' 4\")   Wt 70.1 kg (154 lb 9.6 oz)   SpO2 96%   BMI 26.54 kg/m       Cardiac Rhythm: N/A    Pain Score: 1/10    Is the Patient Confused:  No    Last Food or Drink: 07/09/23 at lunch    Focused Assessment:  Patient arrived to ED with right sided abdominal pain. Found to have an abscess to abdominal wall. Patient states this has been reoccurring since his cholecystectomy a year ago. Patient with recent abscess in June and recently finished antibiotics. Patient is receiving IV Zosyn and infectious disease is following. Patient with minimal pain today that is managed with scheduled Tylenol. Had ultrasound guided aspiration for cultures today.     Tests Performed: Done: Labs and Imaging    Treatments Provided:  Iv abx    Family Dynamics/Concerns: No    Family Updated On Visitor Policy: No    Plan of Care Communicated to Family: No    Who Was Updated about Plan of Care: Patient    Medications sent with patient: none    Additional Information: none    RN: Jolene Yun RN   7/9/2023 1:59 PM       "

## 2023-07-09 NOTE — PROCEDURES
POST PROCEDURE NOTE    Post procedure Diagnosis: RLQ abdominal wall abscess    Technical Procedure: Ultrasound guided aspiration right lower quadrant abdominal wall abscess.    Findings: Thick tan purulent appearing fluid.    Physician: Solitario Perez MD    EBL:  Minimal    Specimens Removed:  Only 2 ml of the purulent appearing fluid could be aspirated. This was sent for cultures.     Complications:  None.

## 2023-07-09 NOTE — PROGRESS NOTES
"[facility]    PROGRESS NOTE - Hospitalist Service    Assessment and Plan    Principal Problem:    Abdominal wall abscess    Willian Reid is a 73 year old male with h/o asthma, GERD, and prior cholecystectomy with complication of retained stone and pancreatitis as well as right psoas abscess that grew E. coli 6/12/2023 who presents with increasing right-sided abdominal pain and is found to have new abdominal wall abscess.   Per chart review: patient with history of cholecystectomy on 6/9/22. Presented on 7/10/22 with recurring abdominal pain and lipase 50183 / total bilirubin 4.3 / alk phos 294 /  / . CT abdomen & pelvis 7/10/22 demonstrates \"uncomplicated acute pancreatitis.\" MRCP 7/11/22 demonstrates \"acute pancreatitis without complications, not significantly changed since CT yesterday [7/10/22]. No biliary ductal dilatation or choledocholithiasis.\" Triglycerides within normal limits (79).  Cause of acute pancreatitis appears to be due to biliary obstruction either from stone that has since passed, or sludge. Patient then developed psoas muscle abscess on 5/06/23. He was treated with antibiotics but subsequently had CT drainage per procedure note of June 12, 2023       Abdominal wall abscess:CT on admission shows 3 x 2.6 x 2.7 cm abscess in the right anterior abdominal wall, WBC 12.6  - History of right psoas abscess that was aspirated and grew E. coli 6/12/2023. He was treated with 2 weeks worth of Augmentin. The aerobic culture did show intermediate sensitivity to Augmentin and patient was not given additional abx.   Patient is off antibiotics prior to admission. Patient reports a week ago he had a follow up visit with his surgeon. Since then, the patient went to a resort and had to leave a day early due to increased abdominal distension and abdominal pain.   - Start IV Zosyn (previously E. Coli was susceptible)  --ER physician spoke with general surgery who did not " "recommend any surgical intervention and recommended IV antibiotics and possible ID consult.  -- s/p US guided drainage with cultures  7/9/23   -- Consult ID   -- Dilaudid and Tylenol for pain control  -- trend WBC in AM  -- follow up blood culture results  - clears and ADAT in the event of any further interventions      Hypertension:   - Atenolol and losartan     History of uncomplicated asthma:  -  Continue home albuterol inhaler     GERD:   - Pepcid     Mild hypercalcemia: stable from previous. Calcium is barely elevated if corrected for albumin of 4.3  -- hold home multivitamin  -- normal ionized calcium     Clinically Significant Risk Factors      # Overweight: Estimated body mass index is 28.31 kg/m  as calculated from the following:  Height as of this encounter: 1.626 m (5' 4\").  Weight as of this encounter: 74.8 kg (164 lb 14.4 oz)., PRESENT ON ADMISSION    COVID-19 PCR Results        6/9/2022    13:20 7/10/2022    19:17   COVID-19 PCR Results   SARS CoV2 PCR Negative  Negative    COVID-19 Antibody Results, Testing for Immunity         No data to display               Code Status: Full Code  VTE prophylaxis:  SCDs and ambulate, start VTE meds tomorrow if no intervetions   DIET: Orders Placed This Encounter      Advance Diet as Tolerated: Clear Liquid Diet    Drains/Lines: none  Weight bearing status: WBAT    Expected Discharge Date: 07/10/2023              Subjective:  No pain,toeratedprocedure well     PHYSICAL EXAM  Temp:  [97.7  F (36.5  C)-98.7  F (37.1  C)] 98  F (36.7  C)  Pulse:  [51-62] 60  Resp:  [16-20] 16  BP: (120-154)/(58-76) 148/64  SpO2:  [97 %-99 %] 98 %  Wt Readings from Last 1 Encounters:   07/08/23 70.1 kg (154 lb 9.6 oz)       Intake/Output Summary (Last 24 hours) at 7/9/2023 0825  Last data filed at 7/8/2023 1457  Gross per 24 hour   Intake 100 ml   Output --   Net 100 ml      Body mass index is 26.54 kg/m .    Physical Exam  Constitutional:       General: He is not in acute distress.     " Appearance: Normal appearance. He is not ill-appearing.   HENT:      Head: Normocephalic and atraumatic.   Cardiovascular:      Rate and Rhythm: Normal rate and regular rhythm.      Pulses: Normal pulses.   Pulmonary:      Effort: Pulmonary effort is normal.      Breath sounds: Normal breath sounds.   Abdominal:      General: Bowel sounds are normal.      Palpations: Abdomen is soft.      Comments: TTP   Musculoskeletal:         General: Normal range of motion.   Skin:     General: Skin is warm and dry.      Capillary Refill: Capillary refill takes less than 2 seconds.   Neurological:      General: No focal deficit present.      Mental Status: He is alert and oriented to person, place, and time. Mental status is at baseline.       PERTINENT LABS/IMAGING:  Results for orders placed or performed during the hospital encounter of 07/08/23   CT Abdomen Pelvis w Contrast    Impression    IMPRESSION:   1.  Small fluid and inflammatory changes noted within the right anterior abdominal wall measuring approximately 3 x 2.6 x 2.7 cm concerning for phlegmon/abscess. This probably is too small for drainage. Additional inflammatory change noted along the   right paracolic gutter and right iliopsoas muscle which contains small fluid and small foci of air. No drainable fluid within this area.         Recent Labs   Lab Test 07/11/22  0626 11/10/21  0934   CHOL  --  190   HDL  --  50   LDL  --  98   TRIG 79 209*     Recent Labs   Lab Test 07/08/23  1201      POTASSIUM 4.9   CHLORIDE 103   CO2 25   GLC 94   BUN 17.0   CR 1.07   GFRESTIMATED 72   ROSSY 10.5*     Recent Labs   Lab Test 11/10/21  0934   A1C 5.7*     Recent Labs   Lab Test 07/09/23  0732 07/08/23  1201 06/12/23  0826   HGB 11.3* 11.7* 11.5*     No results for input(s): TROPONINI in the last 04427 hours.  No results for input(s): BNP, NTBNPI, NTBNP in the last 17987 hours.  No results for input(s): TSH in the last 28113 hours.  Recent Labs   Lab Test 06/12/23  0826    INR 1.08         Jany Mcdonald MD  [facility]  [phone]

## 2023-07-09 NOTE — PLAN OF CARE
"Goal Outcome Evaluation:      Plan of Care Reviewed With: patient    Overall Patient Progress: no changeOverall Patient Progress: no change       Problem: Plan of Care - These are the overarching goals to be used throughout the patient stay.    Goal: Plan of Care Review  Description: The Plan of Care Review/Shift note should be completed every shift.  The Outcome Evaluation is a brief statement about your assessment that the patient is improving, declining, or no change.  This information will be displayed automatically on your shift note.  Outcome: Progressing  Flowsheets (Taken 7/9/2023 0633)  Plan of Care Reviewed With: patient  Overall Patient Progress: no change  Goal: Patient-Specific Goal (Individualized)  Description: You can add care plan individualizations to a care plan. Examples of Individualization might be:  \"Parent requests to be called daily at 9am for status\", \"I have a hard time hearing out of my right ear\", or \"Do not touch me to wake me up as it startles me\".  Outcome: Progressing  Goal: Absence of Hospital-Acquired Illness or Injury  Outcome: Progressing  Intervention: Identify and Manage Fall Risk  Recent Flowsheet Documentation  Taken 7/8/2023 2000 by John Wills RN  Safety Promotion/Fall Prevention:   assistive device/personal items within reach   safety round/check completed   room organization consistent   nonskid shoes/slippers when out of bed  Intervention: Prevent Skin Injury  Recent Flowsheet Documentation  Taken 7/8/2023 2000 by John Wills RN  Body Position: position changed independently  Goal: Optimal Comfort and Wellbeing  Outcome: Progressing  Goal: Readiness for Transition of Care  Outcome: Progressing     Problem: Risk for Delirium  Goal: Optimal Coping  Outcome: Progressing  Goal: Improved Behavioral Control  Outcome: Progressing  Goal: Improved Attention and Thought Clarity  Outcome: Progressing  Goal: Improved Sleep  Outcome: Progressing         "

## 2023-07-10 ENCOUNTER — ANESTHESIA (OUTPATIENT)
Dept: SURGERY | Facility: HOSPITAL | Age: 73
DRG: 982 | End: 2023-07-10
Payer: MEDICARE

## 2023-07-10 LAB
ANION GAP SERPL CALCULATED.3IONS-SCNC: 10 MMOL/L (ref 7–15)
BUN SERPL-MCNC: 19.5 MG/DL (ref 8–23)
CALCIUM SERPL-MCNC: 10 MG/DL (ref 8.8–10.2)
CHLORIDE SERPL-SCNC: 103 MMOL/L (ref 98–107)
CREAT SERPL-MCNC: 1.21 MG/DL (ref 0.67–1.17)
DEPRECATED HCO3 PLAS-SCNC: 23 MMOL/L (ref 22–29)
ERYTHROCYTE [DISTWIDTH] IN BLOOD BY AUTOMATED COUNT: 15.9 % (ref 10–15)
GFR SERPL CREATININE-BSD FRML MDRD: 62 ML/MIN/1.73M2
GLUCOSE SERPL-MCNC: 98 MG/DL (ref 70–99)
GRAM STAIN RESULT: NORMAL
GRAM STAIN RESULT: NORMAL
HCT VFR BLD AUTO: 35.6 % (ref 40–53)
HGB BLD-MCNC: 11 G/DL (ref 13.3–17.7)
MAGNESIUM SERPL-MCNC: 2.1 MG/DL (ref 1.7–2.3)
MCH RBC QN AUTO: 27.5 PG (ref 26.5–33)
MCHC RBC AUTO-ENTMCNC: 30.9 G/DL (ref 31.5–36.5)
MCV RBC AUTO: 89 FL (ref 78–100)
PLATELET # BLD AUTO: 356 10E3/UL (ref 150–450)
POTASSIUM SERPL-SCNC: 4.8 MMOL/L (ref 3.4–5.3)
RBC # BLD AUTO: 4 10E6/UL (ref 4.4–5.9)
SODIUM SERPL-SCNC: 136 MMOL/L (ref 136–145)
WBC # BLD AUTO: 9.4 10E3/UL (ref 4–11)

## 2023-07-10 PROCEDURE — 36415 COLL VENOUS BLD VENIPUNCTURE: CPT | Performed by: INTERNAL MEDICINE

## 2023-07-10 PROCEDURE — 80048 BASIC METABOLIC PNL TOTAL CA: CPT | Performed by: INTERNAL MEDICINE

## 2023-07-10 PROCEDURE — 250N000025 HC SEVOFLURANE, PER MIN: Performed by: SURGERY

## 2023-07-10 PROCEDURE — 250N000011 HC RX IP 250 OP 636: Mod: JZ

## 2023-07-10 PROCEDURE — 250N000013 HC RX MED GY IP 250 OP 250 PS 637: Performed by: INTERNAL MEDICINE

## 2023-07-10 PROCEDURE — 250N000009 HC RX 250: Performed by: SURGERY

## 2023-07-10 PROCEDURE — 87075 CULTR BACTERIA EXCEPT BLOOD: CPT | Performed by: SURGERY

## 2023-07-10 PROCEDURE — 87205 SMEAR GRAM STAIN: CPT | Performed by: SURGERY

## 2023-07-10 PROCEDURE — 0KBK0ZZ EXCISION OF RIGHT ABDOMEN MUSCLE, OPEN APPROACH: ICD-10-PCS | Performed by: SURGERY

## 2023-07-10 PROCEDURE — 710N000009 HC RECOVERY PHASE 1, LEVEL 1, PER MIN: Performed by: SURGERY

## 2023-07-10 PROCEDURE — 258N000003 HC RX IP 258 OP 636: Performed by: SURGERY

## 2023-07-10 PROCEDURE — 88307 TISSUE EXAM BY PATHOLOGIST: CPT | Mod: TC | Performed by: SURGERY

## 2023-07-10 PROCEDURE — 99231 SBSQ HOSP IP/OBS SF/LOW 25: CPT | Performed by: INTERNAL MEDICINE

## 2023-07-10 PROCEDURE — 999N000141 HC STATISTIC PRE-PROCEDURE NURSING ASSESSMENT: Performed by: SURGERY

## 2023-07-10 PROCEDURE — 250N000011 HC RX IP 250 OP 636: Mod: JZ | Performed by: INTERNAL MEDICINE

## 2023-07-10 PROCEDURE — 250N000011 HC RX IP 250 OP 636: Performed by: SURGERY

## 2023-07-10 PROCEDURE — 272N000001 HC OR GENERAL SUPPLY STERILE: Performed by: SURGERY

## 2023-07-10 PROCEDURE — 250N000009 HC RX 250: Performed by: NURSE ANESTHETIST, CERTIFIED REGISTERED

## 2023-07-10 PROCEDURE — 250N000011 HC RX IP 250 OP 636: Performed by: NURSE ANESTHETIST, CERTIFIED REGISTERED

## 2023-07-10 PROCEDURE — 85027 COMPLETE CBC AUTOMATED: CPT | Performed by: INTERNAL MEDICINE

## 2023-07-10 PROCEDURE — 87070 CULTURE OTHR SPECIMN AEROBIC: CPT | Performed by: SURGERY

## 2023-07-10 PROCEDURE — 360N000075 HC SURGERY LEVEL 2, PER MIN: Performed by: SURGERY

## 2023-07-10 PROCEDURE — 258N000003 HC RX IP 258 OP 636: Performed by: NURSE ANESTHETIST, CERTIFIED REGISTERED

## 2023-07-10 PROCEDURE — 87077 CULTURE AEROBIC IDENTIFY: CPT | Performed by: SURGERY

## 2023-07-10 PROCEDURE — 99232 SBSQ HOSP IP/OBS MODERATE 35: CPT | Performed by: INTERNAL MEDICINE

## 2023-07-10 PROCEDURE — 370N000017 HC ANESTHESIA TECHNICAL FEE, PER MIN: Performed by: SURGERY

## 2023-07-10 PROCEDURE — 120N000001 HC R&B MED SURG/OB

## 2023-07-10 PROCEDURE — 250N000013 HC RX MED GY IP 250 OP 250 PS 637: Performed by: SURGERY

## 2023-07-10 PROCEDURE — 83735 ASSAY OF MAGNESIUM: CPT | Performed by: INTERNAL MEDICINE

## 2023-07-10 PROCEDURE — 250N000011 HC RX IP 250 OP 636: Performed by: ANESTHESIOLOGY

## 2023-07-10 RX ORDER — HALOPERIDOL 5 MG/ML
1 INJECTION INTRAMUSCULAR
Status: DISCONTINUED | OUTPATIENT
Start: 2023-07-10 | End: 2023-07-10 | Stop reason: HOSPADM

## 2023-07-10 RX ORDER — ONDANSETRON 2 MG/ML
INJECTION INTRAMUSCULAR; INTRAVENOUS PRN
Status: DISCONTINUED | OUTPATIENT
Start: 2023-07-10 | End: 2023-07-10

## 2023-07-10 RX ORDER — HYDROMORPHONE HYDROCHLORIDE 1 MG/ML
0.2 INJECTION, SOLUTION INTRAMUSCULAR; INTRAVENOUS; SUBCUTANEOUS EVERY 5 MIN PRN
Status: DISCONTINUED | OUTPATIENT
Start: 2023-07-10 | End: 2023-07-10 | Stop reason: HOSPADM

## 2023-07-10 RX ORDER — ONDANSETRON 4 MG/1
4 TABLET, ORALLY DISINTEGRATING ORAL EVERY 30 MIN PRN
Status: DISCONTINUED | OUTPATIENT
Start: 2023-07-10 | End: 2023-07-10 | Stop reason: HOSPADM

## 2023-07-10 RX ORDER — HYDROMORPHONE HYDROCHLORIDE 1 MG/ML
0.1 INJECTION, SOLUTION INTRAMUSCULAR; INTRAVENOUS; SUBCUTANEOUS
Status: DISCONTINUED | OUTPATIENT
Start: 2023-07-10 | End: 2023-07-13 | Stop reason: HOSPADM

## 2023-07-10 RX ORDER — HYDROMORPHONE HYDROCHLORIDE 2 MG/1
2 TABLET ORAL EVERY 4 HOURS PRN
Status: DISCONTINUED | OUTPATIENT
Start: 2023-07-10 | End: 2023-07-13 | Stop reason: HOSPADM

## 2023-07-10 RX ORDER — LIDOCAINE 40 MG/G
CREAM TOPICAL
Status: DISCONTINUED | OUTPATIENT
Start: 2023-07-10 | End: 2023-07-10 | Stop reason: HOSPADM

## 2023-07-10 RX ORDER — ONDANSETRON 4 MG/1
4 TABLET, ORALLY DISINTEGRATING ORAL EVERY 6 HOURS PRN
Status: DISCONTINUED | OUTPATIENT
Start: 2023-07-10 | End: 2023-07-13 | Stop reason: HOSPADM

## 2023-07-10 RX ORDER — ACETAMINOPHEN 325 MG/1
975 TABLET ORAL ONCE
Status: COMPLETED | OUTPATIENT
Start: 2023-07-10 | End: 2023-07-10

## 2023-07-10 RX ORDER — ACETAMINOPHEN 325 MG/1
650 TABLET ORAL EVERY 4 HOURS PRN
Status: DISCONTINUED | OUTPATIENT
Start: 2023-07-13 | End: 2023-07-13 | Stop reason: HOSPADM

## 2023-07-10 RX ORDER — SODIUM CHLORIDE, SODIUM LACTATE, POTASSIUM CHLORIDE, CALCIUM CHLORIDE 600; 310; 30; 20 MG/100ML; MG/100ML; MG/100ML; MG/100ML
INJECTION, SOLUTION INTRAVENOUS CONTINUOUS
Status: DISCONTINUED | OUTPATIENT
Start: 2023-07-10 | End: 2023-07-10 | Stop reason: HOSPADM

## 2023-07-10 RX ORDER — LIDOCAINE HYDROCHLORIDE 10 MG/ML
INJECTION, SOLUTION INFILTRATION; PERINEURAL PRN
Status: DISCONTINUED | OUTPATIENT
Start: 2023-07-10 | End: 2023-07-10

## 2023-07-10 RX ORDER — HYDROMORPHONE HYDROCHLORIDE 1 MG/ML
0.2 INJECTION, SOLUTION INTRAMUSCULAR; INTRAVENOUS; SUBCUTANEOUS
Status: DISCONTINUED | OUTPATIENT
Start: 2023-07-10 | End: 2023-07-13 | Stop reason: HOSPADM

## 2023-07-10 RX ORDER — CEFAZOLIN SODIUM/WATER 2 G/20 ML
2 SYRINGE (ML) INTRAVENOUS
Status: COMPLETED | OUTPATIENT
Start: 2023-07-10 | End: 2023-07-10

## 2023-07-10 RX ORDER — ONDANSETRON 2 MG/ML
4 INJECTION INTRAMUSCULAR; INTRAVENOUS EVERY 6 HOURS PRN
Status: DISCONTINUED | OUTPATIENT
Start: 2023-07-10 | End: 2023-07-13 | Stop reason: HOSPADM

## 2023-07-10 RX ORDER — GLYCOPYRROLATE 0.2 MG/ML
INJECTION, SOLUTION INTRAMUSCULAR; INTRAVENOUS PRN
Status: DISCONTINUED | OUTPATIENT
Start: 2023-07-10 | End: 2023-07-10

## 2023-07-10 RX ORDER — SODIUM CHLORIDE, SODIUM LACTATE, POTASSIUM CHLORIDE, CALCIUM CHLORIDE 600; 310; 30; 20 MG/100ML; MG/100ML; MG/100ML; MG/100ML
INJECTION, SOLUTION INTRAVENOUS CONTINUOUS PRN
Status: DISCONTINUED | OUTPATIENT
Start: 2023-07-10 | End: 2023-07-10

## 2023-07-10 RX ORDER — FENTANYL CITRATE 50 UG/ML
INJECTION, SOLUTION INTRAMUSCULAR; INTRAVENOUS PRN
Status: DISCONTINUED | OUTPATIENT
Start: 2023-07-10 | End: 2023-07-10

## 2023-07-10 RX ORDER — FENTANYL CITRATE 50 UG/ML
25 INJECTION, SOLUTION INTRAMUSCULAR; INTRAVENOUS
Status: DISCONTINUED | OUTPATIENT
Start: 2023-07-10 | End: 2023-07-10 | Stop reason: HOSPADM

## 2023-07-10 RX ORDER — DEXAMETHASONE SODIUM PHOSPHATE 10 MG/ML
INJECTION, SOLUTION INTRAMUSCULAR; INTRAVENOUS PRN
Status: DISCONTINUED | OUTPATIENT
Start: 2023-07-10 | End: 2023-07-10

## 2023-07-10 RX ORDER — HYDROMORPHONE HYDROCHLORIDE 1 MG/ML
0.4 INJECTION, SOLUTION INTRAMUSCULAR; INTRAVENOUS; SUBCUTANEOUS EVERY 5 MIN PRN
Status: DISCONTINUED | OUTPATIENT
Start: 2023-07-10 | End: 2023-07-10 | Stop reason: HOSPADM

## 2023-07-10 RX ORDER — AMOXICILLIN 250 MG
1 CAPSULE ORAL 2 TIMES DAILY
Status: DISCONTINUED | OUTPATIENT
Start: 2023-07-10 | End: 2023-07-13 | Stop reason: HOSPADM

## 2023-07-10 RX ORDER — FENTANYL CITRATE 50 UG/ML
50 INJECTION, SOLUTION INTRAMUSCULAR; INTRAVENOUS EVERY 5 MIN PRN
Status: DISCONTINUED | OUTPATIENT
Start: 2023-07-10 | End: 2023-07-10 | Stop reason: HOSPADM

## 2023-07-10 RX ORDER — BISACODYL 10 MG
10 SUPPOSITORY, RECTAL RECTAL DAILY PRN
Status: DISCONTINUED | OUTPATIENT
Start: 2023-07-10 | End: 2023-07-13 | Stop reason: HOSPADM

## 2023-07-10 RX ORDER — EPHEDRINE SULFATE 50 MG/ML
INJECTION, SOLUTION INTRAMUSCULAR; INTRAVENOUS; SUBCUTANEOUS PRN
Status: DISCONTINUED | OUTPATIENT
Start: 2023-07-10 | End: 2023-07-10

## 2023-07-10 RX ORDER — OXYCODONE HYDROCHLORIDE 5 MG/1
5 TABLET ORAL
Status: DISCONTINUED | OUTPATIENT
Start: 2023-07-10 | End: 2023-07-10 | Stop reason: HOSPADM

## 2023-07-10 RX ORDER — CEFAZOLIN SODIUM/WATER 2 G/20 ML
2 SYRINGE (ML) INTRAVENOUS SEE ADMIN INSTRUCTIONS
Status: DISCONTINUED | OUTPATIENT
Start: 2023-07-10 | End: 2023-07-10 | Stop reason: HOSPADM

## 2023-07-10 RX ORDER — DEXTROSE MONOHYDRATE, SODIUM CHLORIDE, AND POTASSIUM CHLORIDE 50; 1.49; 4.5 G/1000ML; G/1000ML; G/1000ML
INJECTION, SOLUTION INTRAVENOUS CONTINUOUS
Status: DISCONTINUED | OUTPATIENT
Start: 2023-07-10 | End: 2023-07-11

## 2023-07-10 RX ORDER — PROPOFOL 10 MG/ML
INJECTION, EMULSION INTRAVENOUS CONTINUOUS PRN
Status: DISCONTINUED | OUTPATIENT
Start: 2023-07-10 | End: 2023-07-10

## 2023-07-10 RX ORDER — FENTANYL CITRATE 50 UG/ML
25 INJECTION, SOLUTION INTRAMUSCULAR; INTRAVENOUS EVERY 5 MIN PRN
Status: DISCONTINUED | OUTPATIENT
Start: 2023-07-10 | End: 2023-07-10 | Stop reason: HOSPADM

## 2023-07-10 RX ORDER — LIDOCAINE 40 MG/G
CREAM TOPICAL
Status: DISCONTINUED | OUTPATIENT
Start: 2023-07-10 | End: 2023-07-13 | Stop reason: HOSPADM

## 2023-07-10 RX ORDER — PROCHLORPERAZINE MALEATE 5 MG
5 TABLET ORAL EVERY 6 HOURS PRN
Status: DISCONTINUED | OUTPATIENT
Start: 2023-07-10 | End: 2023-07-13 | Stop reason: HOSPADM

## 2023-07-10 RX ORDER — ONDANSETRON 2 MG/ML
4 INJECTION INTRAMUSCULAR; INTRAVENOUS EVERY 30 MIN PRN
Status: DISCONTINUED | OUTPATIENT
Start: 2023-07-10 | End: 2023-07-10 | Stop reason: HOSPADM

## 2023-07-10 RX ORDER — MAGNESIUM HYDROXIDE 1200 MG/15ML
LIQUID ORAL PRN
Status: DISCONTINUED | OUTPATIENT
Start: 2023-07-10 | End: 2023-07-10 | Stop reason: HOSPADM

## 2023-07-10 RX ORDER — OXYCODONE HYDROCHLORIDE 5 MG/1
10 TABLET ORAL
Status: DISCONTINUED | OUTPATIENT
Start: 2023-07-10 | End: 2023-07-10 | Stop reason: HOSPADM

## 2023-07-10 RX ORDER — PROPOFOL 10 MG/ML
INJECTION, EMULSION INTRAVENOUS PRN
Status: DISCONTINUED | OUTPATIENT
Start: 2023-07-10 | End: 2023-07-10

## 2023-07-10 RX ORDER — LABETALOL HYDROCHLORIDE 5 MG/ML
5 INJECTION, SOLUTION INTRAVENOUS EVERY 10 MIN PRN
Status: DISCONTINUED | OUTPATIENT
Start: 2023-07-10 | End: 2023-07-10 | Stop reason: HOSPADM

## 2023-07-10 RX ORDER — POLYETHYLENE GLYCOL 3350 17 G/17G
17 POWDER, FOR SOLUTION ORAL DAILY
Status: DISCONTINUED | OUTPATIENT
Start: 2023-07-11 | End: 2023-07-13 | Stop reason: HOSPADM

## 2023-07-10 RX ORDER — KETAMINE HYDROCHLORIDE 10 MG/ML
INJECTION INTRAMUSCULAR; INTRAVENOUS PRN
Status: DISCONTINUED | OUTPATIENT
Start: 2023-07-10 | End: 2023-07-10

## 2023-07-10 RX ORDER — ACETAMINOPHEN 325 MG/1
975 TABLET ORAL EVERY 8 HOURS
Status: DISCONTINUED | OUTPATIENT
Start: 2023-07-10 | End: 2023-07-10

## 2023-07-10 RX ADMIN — KETAMINE HYDROCHLORIDE 30 MG: 10 INJECTION, SOLUTION INTRAMUSCULAR; INTRAVENOUS at 11:19

## 2023-07-10 RX ADMIN — PIPERACILLIN AND TAZOBACTAM 3.38 G: 3; .375 INJECTION, POWDER, LYOPHILIZED, FOR SOLUTION INTRAVENOUS at 14:06

## 2023-07-10 RX ADMIN — ONDANSETRON 4 MG: 2 INJECTION INTRAMUSCULAR; INTRAVENOUS at 11:52

## 2023-07-10 RX ADMIN — ATENOLOL 100 MG: 25 TABLET ORAL at 08:59

## 2023-07-10 RX ADMIN — HYDROMORPHONE HYDROCHLORIDE 0.2 MG: 0.2 INJECTION, SOLUTION INTRAMUSCULAR; INTRAVENOUS; SUBCUTANEOUS at 13:31

## 2023-07-10 RX ADMIN — ACETAMINOPHEN 1000 MG: 500 TABLET ORAL at 08:59

## 2023-07-10 RX ADMIN — Medication 5 MG: at 11:50

## 2023-07-10 RX ADMIN — PIPERACILLIN AND TAZOBACTAM 3.38 G: 3; .375 INJECTION, POWDER, LYOPHILIZED, FOR SOLUTION INTRAVENOUS at 22:43

## 2023-07-10 RX ADMIN — ACETAMINOPHEN 1000 MG: 500 TABLET ORAL at 20:13

## 2023-07-10 RX ADMIN — LOSARTAN POTASSIUM 50 MG: 50 TABLET, FILM COATED ORAL at 08:58

## 2023-07-10 RX ADMIN — PROPOFOL 50 MCG/KG/MIN: 10 INJECTION, EMULSION INTRAVENOUS at 11:24

## 2023-07-10 RX ADMIN — Medication 2 G: at 11:10

## 2023-07-10 RX ADMIN — FENTANYL CITRATE 25 MCG: 50 INJECTION, SOLUTION INTRAMUSCULAR; INTRAVENOUS at 12:30

## 2023-07-10 RX ADMIN — FAMOTIDINE 40 MG: 20 TABLET ORAL at 08:58

## 2023-07-10 RX ADMIN — SENNOSIDES AND DOCUSATE SODIUM 1 TABLET: 50; 8.6 TABLET ORAL at 20:13

## 2023-07-10 RX ADMIN — SUGAMMADEX 130 MG: 100 INJECTION, SOLUTION INTRAVENOUS at 11:48

## 2023-07-10 RX ADMIN — FENTANYL CITRATE 25 MCG: 50 INJECTION, SOLUTION INTRAMUSCULAR; INTRAVENOUS at 12:22

## 2023-07-10 RX ADMIN — PIPERACILLIN AND TAZOBACTAM 3.38 G: 3; .375 INJECTION, POWDER, LYOPHILIZED, FOR SOLUTION INTRAVENOUS at 04:15

## 2023-07-10 RX ADMIN — ACETAMINOPHEN 1000 MG: 500 TABLET ORAL at 13:31

## 2023-07-10 RX ADMIN — POTASSIUM CHLORIDE, DEXTROSE MONOHYDRATE AND SODIUM CHLORIDE: 150; 5; 450 INJECTION, SOLUTION INTRAVENOUS at 14:41

## 2023-07-10 RX ADMIN — GLYCOPYRROLATE 0.4 MG: 0.2 INJECTION INTRAMUSCULAR; INTRAVENOUS at 11:34

## 2023-07-10 RX ADMIN — Medication 5 MG: at 11:27

## 2023-07-10 RX ADMIN — FENTANYL CITRATE 25 MCG: 50 INJECTION, SOLUTION INTRAMUSCULAR; INTRAVENOUS at 11:23

## 2023-07-10 RX ADMIN — ROCURONIUM BROMIDE 40 MG: 50 INJECTION, SOLUTION INTRAVENOUS at 11:19

## 2023-07-10 RX ADMIN — PROPOFOL 130 MG: 10 INJECTION, EMULSION INTRAVENOUS at 11:19

## 2023-07-10 RX ADMIN — LIDOCAINE HYDROCHLORIDE 5 ML: 10 INJECTION, SOLUTION INFILTRATION; PERINEURAL at 11:19

## 2023-07-10 RX ADMIN — SODIUM CHLORIDE, POTASSIUM CHLORIDE, SODIUM LACTATE AND CALCIUM CHLORIDE: 600; 310; 30; 20 INJECTION, SOLUTION INTRAVENOUS at 10:32

## 2023-07-10 RX ADMIN — PHENYLEPHRINE HYDROCHLORIDE 100 MCG: 10 INJECTION INTRAVENOUS at 11:32

## 2023-07-10 RX ADMIN — DEXAMETHASONE SODIUM PHOSPHATE 10 MG: 10 INJECTION, SOLUTION INTRAMUSCULAR; INTRAVENOUS at 11:16

## 2023-07-10 RX ADMIN — Medication 10 MG: at 11:30

## 2023-07-10 RX ADMIN — FENTANYL CITRATE 75 MCG: 50 INJECTION, SOLUTION INTRAMUSCULAR; INTRAVENOUS at 11:19

## 2023-07-10 ASSESSMENT — ACTIVITIES OF DAILY LIVING (ADL)
ADLS_ACUITY_SCORE: 20

## 2023-07-10 NOTE — CONSULTS
Right abdominal wall collection aspirated on 7.9.2023 and sent for culture. Unable to place drain secondary to recent aspiration and small size.

## 2023-07-10 NOTE — PROGRESS NOTES
Progress Note    Assessment/Plan  I was called to evaluate patient as he had some bright red blood from his wound right lower quadrant status post debridement abdominal wall earlier today.  Nursing staff informed me that the patient was received from the recovery room with reason for seeing ABD pad placed over his wound.  Evaluation of the wound revealed 1 very small muscular bleeder.  This was oversewn with 3-0 Vicryl.  Remainder of wound is stable.  Small amount of Gelfoam powder placed as well.  Wound repacked.    Principal Problem:    Abdominal wall abscess  Active Problems:    Essential hypertension    Psoas muscle abscess (H)      Subjective  Patient comfortable  Objective    Vital signs in last 24 hours  Temp:  [97.3  F (36.3  C)-98  F (36.7  C)] 97.9  F (36.6  C)  Pulse:  [52-72] 52  Resp:  [13-24] 18  BP: (129-162)/(60-73) 129/62  SpO2:  [96 %-100 %] 98 %  Weight:   [unfilled]    Intake/Output last 3 shifts  I/O last 3 completed shifts:  In: 920 [P.O.:320; I.V.:600]  Out: 1 [Blood:1]  Intake/Output this shift:  No intake/output data recorded.      Physical Exam  As above wound inspected with no additional bleeding source.    Pertinent Labs   Lab Results   Component Value Date    WBC 9.4 07/10/2023    HGB 11.0 (L) 07/10/2023    HCT 35.6 (L) 07/10/2023    MCV 89 07/10/2023     07/10/2023             Pertinent Radiology     [unfilled]        Kody Murguia MD

## 2023-07-10 NOTE — PLAN OF CARE
"  Problem: Plan of Care - These are the overarching goals to be used throughout the patient stay.    Goal: Optimal Comfort and Wellbeing  Outcome: Progressing     Problem: Risk for Delirium  Goal: Improved Sleep  Outcome: Progressing     Problem: Plan of Care - These are the overarching goals to be used throughout the patient stay.       Goal Outcome Evaluation: Took over pt's care at 7pm. Pt is alert and oriented , reported no pain, had multiple loose bowel movement after the bowel prep. Stated \"feeling a lot better\" pt will be NPO midnight, pt is aware of his NPO status. Pt can make his needs known.                         "

## 2023-07-10 NOTE — PLAN OF CARE
Problem: Surgery Nonspecified  Goal: Optimal Pain Control and Function  Outcome: Progressing     Problem: Surgery Nonspecified  Goal: Nausea and Vomiting Relief  Outcome: Progressing     Problem: Surgery Nonspecified  Goal: Absence of Bleeding  Outcome: Not Progressing     Patient VSS on RA this AM, A+Ox4, denying pain. NPO for abd I+D w/ Dr. Murguia. Patient returned from procedure shortly before 1:30 PM. VSS on 2 L O2 upon return to unit, endorsing pain 6-7/10, down to 3/10 after scheduled APAP and prn Dilaudid. IV Zosyn started, second IV placed for IVF d/t incompatibility. Post-op VS remain stable on this shift. Surgeon updated about drainage/bleeding coming through dressing, got OK to replace outer 4x4 gauze. Changed once, dressing completely saturated w/ dark red drainage within the hour. Changed 4x4s again, sanguinous drainage continued to trickle from wound during dressing change. Original packing intact. Page sent out to Dr. Murguia regarding frequent saturation of dressings, no response at this time (4:30 PM).     For vital signs and complete assessments, please see documentation flowsheets. For detailed medication administrations, please see PARMINDER العراقي RN 7/10/2023  [id number]

## 2023-07-10 NOTE — PLAN OF CARE
Problem: Plan of Care - These are the overarching goals to be used throughout the patient stay.    Goal: Absence of Hospital-Acquired Illness or Injury  Intervention: Identify and Manage Fall Risk  Recent Flowsheet Documentation  Taken 7/10/2023 0005 by Bryan Portillo, RN  Safety Promotion/Fall Prevention: assistive device/personal items within reach  Intervention: Prevent Skin Injury  Recent Flowsheet Documentation  Taken 7/10/2023 0005 by Bryan Portillo, RN  Body Position: position changed independently   Goal Outcome Evaluation:       Patient is alert and oriented x4. Denies abdominal pain, still having diarrhea per pt but less frequent.

## 2023-07-10 NOTE — ANESTHESIA PROCEDURE NOTES
Airway       Patient location during procedure: OR       Procedure Start/Stop Times: 7/10/2023 11:22 AM  Staff -        CRNA: Annabella Chambers APRN CRNA       Performed By: CRNA  Consent for Airway        Urgency: elective  Indications and Patient Condition       Indications for airway management: kaylyn-procedural         Mask difficulty assessment: 1 - vent by mask    Final Airway Details       Final airway type: endotracheal airway       Successful airway: ETT - single  Endotracheal Airway Details        ETT size (mm): 7.5       Cuffed: yes       Successful intubation technique: video laryngoscopy       VL Blade Size: Glidescope 3       Grade View of Cords: 1       Adjucts: stylet       Position: Right       Measured from: gums/teeth       Secured at (cm): 22       Bite block used: None    Post intubation assessment        Placement verified by: capnometry, equal breath sounds and chest rise        Number of attempts at approach: 1       Number of other approaches attempted: 0       Secured with: silk tape       Ease of procedure: easy       Dentition: Intact and Unchanged    Medication(s) Administered   Medication Administration Time: 7/10/2023 11:22 AM

## 2023-07-10 NOTE — ANESTHESIA CARE TRANSFER NOTE
Patient: Willian Reid    Procedure: Procedure(s):  INCISION AND DRAINAGE, TORSO       Diagnosis: Abdominal wall abscess [L02.211]  Diagnosis Additional Information: No value filed.    Anesthesia Type:   General     Note:    Oropharynx: oropharynx clear of all foreign objects  Level of Consciousness: drowsy  Oxygen Supplementation: face mask  Level of Supplemental Oxygen (L/min / FiO2): 8  Independent Airway: airway patency satisfactory and stable  Dentition: dentition unchanged  Vital Signs Stable: post-procedure vital signs reviewed and stable  Report to RN Given: handoff report given  Patient transferred to: PACU  Comments: Patient spontaneously breathing.  Tidal volumes > 400ml.  Following commands, suctioned and extubated with [place].  O2 via mask at 8L.  To PACU, VSS, SBAR report to RN per institutional handoff policy and procedure.  Transfer of care.  Handoff Report: Identifed the Patient, Identified the Reponsible Provider, Reviewed the pertinent medical history, Discussed the surgical course, Reviewed Intra-OP anesthesia mangement and issues during anesthesia, Set expectations for post-procedure period and Allowed opportunity for questions and acknowledgement of understanding      Vitals:  Vitals Value Taken Time   /70 07/10/23 1204   Temp     Pulse 71 07/10/23 1204   Resp 27 07/10/23 1204   SpO2 100 % 07/10/23 1204   Vitals shown include unvalidated device data.    Electronically Signed By: CECELIA Addison CRNA  July 10, 2023  12:06 PM

## 2023-07-10 NOTE — OP NOTE
OPERATIVE REPORT    Willian Reid   [facility]  Medical Record #:  [id number]  YOB: 1950  Age:  73 year old    PROCEDURE DATE:  7/8/2023 - 7/10/2023    PREOPERATIVE DIAGNOSIS: Chronic and acute abdominal wall abscess right mid abdomen status post laparoscopic cholecystectomy    POSTOPERATIVE DIAGNOSIS: Same with finding of significant necrotic tissue in abscess of the abdominal wall with retained stone fragment    PROCEDURE: 1.  Removal of foreign body right mid abdominal wall 2.  Debridement skin subcutaneous tissue muscle and fascia 5 x 7 by 4 cm open packing of wound    OPERATING SURGEON:  Kody Murguia MD    ASSISTANT: Technician    ANESTHESIA: General    DESCRIPTION OF PROCEDURE: With the patient in supine position under general anesthesia having reviewed the risk benefits and complications of surgical intervention with him and his wife and the potential extent of surgical intervention in light of having had cholecystectomy last year and with chronic inflammatory process abscess right abdominal wall the abdomen is prepped and draped usual sterile fashion.  Curvilinear incision is made directly over the palpable mass.  Skin subcutaneous tissue sharply incised and the intramuscular abscess cavity is entered.  Additional culture is obtained.  Should be noted culture was obtained yesterday with a needle aspiration.  In sequential fashion the subcutaneous tissue muscle fascia and portions of oblique muscles are resected to clean healthy edges of the immediate preperitoneal level.  There was no obvious communication to the peritoneal cavity.  Wound is irrigated with antibiotic solution time taken to ensure that adequate hemostasis is obtained.  Wound is partially closed in the muscular layer with interrupted 0 Vicryl laterally and the remainder of the wound is packed open with Hibiclens saline solution.  The estimated blood loss is minimal there were no complications and the patient  tolerated the procedure well.  Sponge and counts correct x2.    EBL:  [unfilled]    SPECIMENS:    ID Type Source Tests Collected by Time Destination   1 : ABDOMINAL  ABCESS FOREIGN BODY  Foreign Body Abdomen SURGICAL PATHOLOGY EXAM Kody Ware MD 7/10/2023 11:35 AM    2 : PORTION ABDOMINAL WALL  Tissue Abdomen SURGICAL PATHOLOGY EXAM Kody Ware MD 7/10/2023 11:36 AM    A : ABDOMINAL ABCESS  Abscess Abdomen ANAEROBIC BACTERIAL CULTURE ROUTINE, GRAM STAIN, AEROBIC BACTERIAL CULTURE ROUTINE Kody Ware MD 7/10/2023 11:33 AM        Kody ware md  [facility]

## 2023-07-10 NOTE — PROGRESS NOTES
"[facility]    PROGRESS NOTE - Hospitalist Service    Assessment and Plan    Principal Problem:    Abdominal wall abscess  Active Problems:    Essential hypertension    Psoas muscle abscess (H)    Willian Reid is a 73 year old male with h/o asthma, GERD, and prior cholecystectomy with complication of retained stone and pancreatitis as well as right psoas abscess that grew E. coli 6/12/2023 who presents with increasing right-sided abdominal pain and is found to have new abdominal wall abscess.   Per chart review: patient with history of cholecystectomy on 6/9/22. Presented on 7/10/22 with recurring abdominal pain and lipase 58391 / total bilirubin 4.3 / alk phos 294 /  / . CT abdomen & pelvis 7/10/22 demonstrates \"uncomplicated acute pancreatitis.\" MRCP 7/11/22 demonstrates \"acute pancreatitis without complications, not significantly changed since CT yesterday [7/10/22]. No biliary ductal dilatation or choledocholithiasis.\" Triglycerides within normal limits (79).  Cause of acute pancreatitis appears to be due to biliary obstruction either from stone that has since passed, or sludge. Patient then developed psoas muscle abscess on 5/06/23. He was treated with antibiotics but subsequently had CT drainage per procedure note of June 12, 2023       Abdominal wall abscess:CT on admission shows 3 x 2.6 x 2.7 cm abscess in the right anterior abdominal wall, WBC 12.6  - History of right psoas abscess that was aspirated and grew E. coli 6/12/2023. He was treated with 2 weeks worth of Augmentin. The aerobic culture did show intermediate sensitivity to Augmentin and patient was not given additional abx.   Patient is off antibiotics prior to admission. Patient reports a week ago he had a follow up visit with his surgeon. Since then, the patient went to a resort and had to leave a day early due to increased abdominal distension and abdominal pain.   -  continue IV Zosyn (previously E. Coli " "was susceptible)  -- s/p US guided drainage with cultures  7/9/23   -- ID following   - surgery seen and s/p Removal of foreign body right mid abdominal wall 2.  Debridement skin subcutaneous tissue muscle and fascia 5 x 7 cm open packing of wound  -- Dilaudid and Tylenol for pain control  - trend labs   - wound cultures pending from aspiration and intra-operative   - blood cx NGTD      Hypertension:   - Atenolol and losartan     History of uncomplicated asthma:  -  Continue home albuterol inhaler     GERD:   - Pepcid     Mild hypercalcemia: stable from previous. Calcium is barely elevated if corrected for albumin of 4.3  -- hold home multivitamin  -- normal ionized calcium     Clinically Significant Risk Factors      # Overweight: Estimated body mass index is 28.31 kg/m  as calculated from the following:  Height as of this encounter: 1.626 m (5' 4\").  Weight as of this encounter: 74.8 kg (164 lb 14.4 oz)., PRESENT ON ADMISSION    COVID-19 PCR Results        6/9/2022    13:20 7/10/2022    19:17   COVID-19 PCR Results   SARS CoV2 PCR Negative  Negative    COVID-19 Antibody Results, Testing for Immunity         No data to display               Code Status: Full Code  VTE prophylaxis:  SCDs and ambulate, post op so surgery to okay when can start meds   DIET: Orders Placed This Encounter      Advance Diet as Tolerated: Full Liquid Diet      Regular Diet Adult    Drains/Lines: none  Weight bearing status: WBAT     Expected Discharge Date: 07/11/2023      Destination: home with family        Subjective:  Seen in PACU comfortable nontoxic     PHYSICAL EXAM  Temp:  [97.3  F (36.3  C)-98  F (36.7  C)] 97.7  F (36.5  C)  Pulse:  [50-72] 61  Resp:  [13-24] 17  BP: (119-162)/(56-72) 151/72  SpO2:  [96 %-100 %] 99 %  Wt Readings from Last 1 Encounters:   07/10/23 66.2 kg (145 lb 15.1 oz)       Intake/Output Summary (Last 24 hours) at 7/9/2023 0825  Last data filed at 7/8/2023 1457  Gross per 24 hour   Intake 100 ml   Output -- "   Net 100 ml      Body mass index is 25.05 kg/m .    Physical Exam  Constitutional:       General: He is not in acute distress.     Appearance: Normal appearance. He is not ill-appearing.   HENT:      Head: Normocephalic and atraumatic.   Cardiovascular:      Rate and Rhythm: Normal rate and regular rhythm.      Pulses: Normal pulses.   Pulmonary:      Effort: Pulmonary effort is normal.      Breath sounds: Normal breath sounds.   Abdominal:      Comments: Mild distension, hypoactive BS, wound bandaged and packed per nursing    Musculoskeletal:         General: Normal range of motion.   Skin:     General: Skin is warm and dry.      Capillary Refill: Capillary refill takes less than 2 seconds.   Neurological:      General: No focal deficit present.      Mental Status: He is alert and oriented to person, place, and time. Mental status is at baseline.       PERTINENT LABS/IMAGING:  Results for orders placed or performed during the hospital encounter of 07/08/23   CT Abdomen Pelvis w Contrast    Impression    IMPRESSION:   1.  Small fluid and inflammatory changes noted within the right anterior abdominal wall measuring approximately 3 x 2.6 x 2.7 cm concerning for phlegmon/abscess. This probably is too small for drainage. Additional inflammatory change noted along the   right paracolic gutter and right iliopsoas muscle which contains small fluid and small foci of air. No drainable fluid within this area.           Jany Mcdonald MD  [facility]  [phone]

## 2023-07-10 NOTE — PROGRESS NOTES
Care Management Follow Up    Length of Stay (days): 2    Expected Discharge Date: 07/11/2023     Concerns to be Addressed:     Discharge planning  Patient plan of care discussed at interdisciplinary rounds: Yes    Anticipated Discharge Disposition:  Home no needs   Anticipated Discharge Services:  n/a  Anticipated Discharge DME:  n/a    Patient/family educated on Medicare website which has current facility and service quality ratings:  n/a  Education Provided on the Discharge Plan:  yes  Patient/Family in Agreement with the Plan:  yes    Referrals Placed by CM/SW:  yes  Private pay costs discussed: Not applicable    Additional Information:  SW completed chart assessment due to low readmissions score. Pt comes from home with spouse, and appears to be independent at baseline. Anticipate no needs from care management at discharge. Pt to follow with PCP if needs arise post discharge.    CM to continue to follow through hospitalization.  8:45 AM    CAYETANO Saenz  7/10/2023

## 2023-07-10 NOTE — PROGRESS NOTES
[facility]  Infectious Disease   Progress Note     Date of Admission:  7/8/2023    Assessment & Plan     Right psoas abscess aspiration, E. Coli, 6/12/23  Suspected to be previous cholecystitis or sigmoid colitis  Recent antibiotic use Augmentin  Lap choly 6/9/2022  Seen by surgeon , gareth, was doing okay     Now anterior abdominal wall, right side, abscess pocket with retained biliary stone, potentially fistula,aspirate >>LFGNR , confirming GI origin  Now s/p   1.  Removal of foreign body right mid abdominal wall 2.  Debridement skin subcutaneous tissue muscle and fascia 5 x 7 cm open packing of wound       PLAN  Continue IV Zosyn  Plan is PO abx    Jolly Watts M.D.    ______________________________________________________________________    Interval History   Postop  Feels well      Physical Exam   Vital Signs: Temp: 97.7  F (36.5  C) Temp src: Temporal BP: (!) 141/73 Pulse: 58   Resp: 18 SpO2: 97 % O2 Device: Nasal cannula Oxygen Delivery: 2 LPM  Weight: 145 lbs 15.11 oz  Gen. appearance nontoxic  Eyes no conjunctivitis or icterus  Neck no stiffness or neck vein distention, no LN  Heart  No edema  Lungs breathing comfortably  Abdomen soft not tender; aspirate site not red  Extremities no synovitis, trace edema  Skin  no rash or emboli  Neurologic alert oriented no focal deficits    Data   Results for orders placed or performed during the hospital encounter of 07/08/23 (from the past 24 hour(s))   Basic metabolic panel   Result Value Ref Range    Sodium 136 136 - 145 mmol/L    Potassium 4.8 3.4 - 5.3 mmol/L    Chloride 103 98 - 107 mmol/L    Carbon Dioxide (CO2) 23 22 - 29 mmol/L    Anion Gap 10 7 - 15 mmol/L    Urea Nitrogen 19.5 8.0 - 23.0 mg/dL    Creatinine 1.21 (H) 0.67 - 1.17 mg/dL    Calcium 10.0 8.8 - 10.2 mg/dL    Glucose 98 70 - 99 mg/dL    GFR Estimate 62 >60 mL/min/1.73m2   CBC with platelets   Result Value Ref Range    WBC Count 9.4 4.0 - 11.0 10e3/uL    RBC Count 4.00  (L) 4.40 - 5.90 10e6/uL    Hemoglobin 11.0 (L) 13.3 - 17.7 g/dL    Hematocrit 35.6 (L) 40.0 - 53.0 %    MCV 89 78 - 100 fL    MCH 27.5 26.5 - 33.0 pg    MCHC 30.9 (L) 31.5 - 36.5 g/dL    RDW 15.9 (H) 10.0 - 15.0 %    Platelet Count 356 150 - 450 10e3/uL   Magnesium   Result Value Ref Range    Magnesium 2.1 1.7 - 2.3 mg/dL

## 2023-07-10 NOTE — ANESTHESIA POSTPROCEDURE EVALUATION
Patient: Willian Reid    Procedure: Procedure(s):  INCISION AND DRAINAGE, TORSO       Anesthesia Type:  General    Note:     Postop Pain Control: Uneventful            Sign Out: Well controlled pain   PONV: No   Neuro/Psych: Uneventful            Sign Out: Acceptable/Baseline neuro status   Airway/Respiratory: Uneventful            Sign Out: Acceptable/Baseline resp. status   CV/Hemodynamics: Uneventful            Sign Out: Acceptable CV status; No obvious hypovolemia; No obvious fluid overload   Other NRE: NONE   DID A NON-ROUTINE EVENT OCCUR? No           Last vitals:  Vitals Value Taken Time   /72 07/10/23 1245   Temp 36.5  C (97.7  F) 07/10/23 1240   Pulse 65 07/10/23 1307   Resp 21 07/10/23 1249   SpO2 100 % 07/10/23 1307   Vitals shown include unvalidated device data.    Electronically Signed By: Tiffanie Carl MD  July 10, 2023  1:19 PM

## 2023-07-10 NOTE — ANESTHESIA PREPROCEDURE EVALUATION
Anesthesia Pre-Procedure Evaluation    Patient: Willian Reid   MRN: [id number] : 1950        Procedure : Procedure(s):  INCISION AND DRAINAGE, TORSO          Past Medical History:   Diagnosis Date     Arthritis      Diverticulitis of colon (without mention of hemorrhage)(562.11) 1980     Hypertension      Uncomplicated asthma       Past Surgical History:   Procedure Laterality Date     ABDOMEN SURGERY      9 inch colon removed due to abscess     BIOPSY       COLONOSCOPY       COLONOSCOPY N/A 2022    Procedure: COLONOSCOPY, FLEXIBLE, WITH LESION REMOVAL USING SNARE;  Surgeon: Solitario Winter MD;  Location: [facility]     FLEXIBLE SIGMOIDOSCOPY  2005     Flexible sigmoidoscopy probably should be changed to a colonoscopy at his next visit to get further and to help him with sedation, so in  he should have a colonoscopy and then every 10 years afterwards     GI SURGERY       HERNIA REPAIR       LAPAROSCOPIC CHOLECYSTECTOMY N/A 2022    Procedure: CHOLECYSTECTOMY, LAPAROSCOPIC;  Surgeon: Joya Bains MD;  Location: [facility]     ORTHOPEDIC SURGERY        Allergies   Allergen Reactions     Tetracycline Rash      Social History     Tobacco Use     Smoking status: Former     Packs/day: 0.50     Years: 4.00     Pack years: 2.00     Types: Cigarettes     Start date: 1967     Quit date: 1971     Years since quittin.5     Smokeless tobacco: Never   Substance Use Topics     Alcohol use: No      Wt Readings from Last 1 Encounters:   07/10/23 66.2 kg (145 lb 15.1 oz)        Anesthesia Evaluation   Pt has had prior anesthetic.     No history of anesthetic complications       ROS/MED HX  ENT/Pulmonary:     (+) SAGAR risk factors, Intermittent, asthma Treatment: Inhaler prn,      Neurologic:  - neg neurologic ROS     Cardiovascular:     (+) hypertension-----    METS/Exercise Tolerance: 4 - Raking leaves, gardening    Hematologic:  - neg hematologic  ROS     Musculoskeletal:   (+)  arthritis,     GI/Hepatic:     (+) GERD,     Renal/Genitourinary:  - neg Renal ROS     Endo:  - neg endo ROS     Psychiatric/Substance Use:       Infectious Disease:       Malignancy:       Other:            Physical Exam    Airway        Mallampati: II   TM distance: < 3 FB   Neck ROM: full     Respiratory Devices and Support         Dental       (+) Modest Abnormalities - crowns, retainers, 1 or 2 missing teeth      Cardiovascular          Rhythm and rate: regular     Pulmonary           breath sounds clear to auscultation           OUTSIDE LABS:  CBC:   Lab Results   Component Value Date    WBC 9.4 07/10/2023    WBC 11.0 07/09/2023    HGB 11.0 (L) 07/10/2023    HGB 11.3 (L) 07/09/2023    HCT 35.6 (L) 07/10/2023    HCT 36.2 (L) 07/09/2023     07/10/2023     07/09/2023     BMP:   Lab Results   Component Value Date     07/10/2023     07/08/2023    POTASSIUM 4.8 07/10/2023    POTASSIUM 4.9 07/08/2023    CHLORIDE 103 07/10/2023    CHLORIDE 103 07/08/2023    CO2 23 07/10/2023    CO2 25 07/08/2023    BUN 19.5 07/10/2023    BUN 17.0 07/08/2023    CR 1.21 (H) 07/10/2023    CR 1.07 07/08/2023    GLC 98 07/10/2023    GLC 94 07/08/2023     COAGS:   Lab Results   Component Value Date    INR 1.08 06/12/2023     POC: No results found for: BGM, HCG, HCGS  HEPATIC:   Lab Results   Component Value Date    ALBUMIN 4.3 07/08/2023    PROTTOTAL 9.3 (H) 07/08/2023    ALT 13 07/08/2023    AST 27 07/08/2023    ALKPHOS 109 07/08/2023    BILITOTAL 0.4 07/08/2023     OTHER:   Lab Results   Component Value Date    PH 7.38 07/09/2023    LACT 0.8 07/08/2023    A1C 5.7 (H) 11/10/2021    ROSSY 10.0 07/10/2023    PHOS 2.5 06/10/2022    MAG 2.1 07/10/2023    LIPASE 26 05/05/2023    TSH 0.81 03/02/2009    T4 1.11 03/02/2009       Anesthesia Plan    ASA Status:  2   NPO Status:  NPO Appropriate    Anesthesia Type: General.     - Airway: ETT   Induction: Intravenous, Propofol.   Maintenance: Balanced.   Techniques and  Equipment:     - Airway: Video-Laryngoscope         Consents    Anesthesia Plan(s) and associated risks, benefits, and realistic alternatives discussed. Questions answered and patient/representative(s) expressed understanding.    - Discussed:     - Discussed with:  Patient         Postoperative Care    Pain management: Multi-modal analgesia.   PONV prophylaxis: Background Propofol Infusion, Dexamethasone or Solumedrol, Ondansetron (or other 5HT-3)     Comments:    Other Comments:     Zhao Carl MD

## 2023-07-11 LAB
ANION GAP SERPL CALCULATED.3IONS-SCNC: 10 MMOL/L (ref 7–15)
BACTERIA ABSC ANAEROBE+AEROBE CULT: ABNORMAL
BUN SERPL-MCNC: 16.1 MG/DL (ref 8–23)
CALCIUM SERPL-MCNC: 10.1 MG/DL (ref 8.8–10.2)
CHLORIDE SERPL-SCNC: 105 MMOL/L (ref 98–107)
CREAT SERPL-MCNC: 1.1 MG/DL (ref 0.67–1.17)
DEPRECATED HCO3 PLAS-SCNC: 23 MMOL/L (ref 22–29)
ERYTHROCYTE [DISTWIDTH] IN BLOOD BY AUTOMATED COUNT: 15.9 % (ref 10–15)
GFR SERPL CREATININE-BSD FRML MDRD: 70 ML/MIN/1.73M2
GLUCOSE SERPL-MCNC: 119 MG/DL (ref 70–99)
HCT VFR BLD AUTO: 35.1 % (ref 40–53)
HGB BLD-MCNC: 11.1 G/DL (ref 13.3–17.7)
MCH RBC QN AUTO: 28.1 PG (ref 26.5–33)
MCHC RBC AUTO-ENTMCNC: 31.6 G/DL (ref 31.5–36.5)
MCV RBC AUTO: 89 FL (ref 78–100)
PATH REPORT.COMMENTS IMP SPEC: NORMAL
PATH REPORT.COMMENTS IMP SPEC: NORMAL
PATH REPORT.FINAL DX SPEC: NORMAL
PATH REPORT.GROSS SPEC: NORMAL
PATH REPORT.MICROSCOPIC SPEC OTHER STN: NORMAL
PATH REPORT.RELEVANT HX SPEC: NORMAL
PHOTO IMAGE: NORMAL
PLATELET # BLD AUTO: 387 10E3/UL (ref 150–450)
POTASSIUM SERPL-SCNC: 4.4 MMOL/L (ref 3.4–5.3)
PSA SERPL DL<=0.01 NG/ML-MCNC: 2.79 NG/ML (ref 0–6.5)
RBC # BLD AUTO: 3.95 10E6/UL (ref 4.4–5.9)
SODIUM SERPL-SCNC: 138 MMOL/L (ref 136–145)
WBC # BLD AUTO: 11.3 10E3/UL (ref 4–11)

## 2023-07-11 PROCEDURE — 258N000003 HC RX IP 258 OP 636: Performed by: SURGERY

## 2023-07-11 PROCEDURE — 85027 COMPLETE CBC AUTOMATED: CPT | Performed by: INTERNAL MEDICINE

## 2023-07-11 PROCEDURE — 99232 SBSQ HOSP IP/OBS MODERATE 35: CPT | Performed by: INTERNAL MEDICINE

## 2023-07-11 PROCEDURE — 250N000013 HC RX MED GY IP 250 OP 250 PS 637: Performed by: INTERNAL MEDICINE

## 2023-07-11 PROCEDURE — 258N000003 HC RX IP 258 OP 636: Performed by: INTERNAL MEDICINE

## 2023-07-11 PROCEDURE — 84153 ASSAY OF PSA TOTAL: CPT | Performed by: INTERNAL MEDICINE

## 2023-07-11 PROCEDURE — 120N000001 HC R&B MED SURG/OB

## 2023-07-11 PROCEDURE — 250N000011 HC RX IP 250 OP 636: Mod: JZ

## 2023-07-11 PROCEDURE — 250N000013 HC RX MED GY IP 250 OP 250 PS 637: Performed by: SURGERY

## 2023-07-11 PROCEDURE — 88300 SURGICAL PATH GROSS: CPT | Mod: 26 | Performed by: PATHOLOGY

## 2023-07-11 PROCEDURE — 82310 ASSAY OF CALCIUM: CPT | Performed by: INTERNAL MEDICINE

## 2023-07-11 PROCEDURE — 88307 TISSUE EXAM BY PATHOLOGIST: CPT | Mod: 26 | Performed by: PATHOLOGY

## 2023-07-11 PROCEDURE — 36415 COLL VENOUS BLD VENIPUNCTURE: CPT | Performed by: INTERNAL MEDICINE

## 2023-07-11 RX ORDER — DEXTROSE MONOHYDRATE, SODIUM CHLORIDE, AND POTASSIUM CHLORIDE 50; 1.49; 4.5 G/1000ML; G/1000ML; G/1000ML
INJECTION, SOLUTION INTRAVENOUS CONTINUOUS
Status: DISCONTINUED | OUTPATIENT
Start: 2023-07-11 | End: 2023-07-12

## 2023-07-11 RX ORDER — TAMSULOSIN HYDROCHLORIDE 0.4 MG/1
0.4 CAPSULE ORAL
Status: DISCONTINUED | OUTPATIENT
Start: 2023-07-11 | End: 2023-07-13 | Stop reason: HOSPADM

## 2023-07-11 RX ADMIN — ACETAMINOPHEN 1000 MG: 500 TABLET ORAL at 09:25

## 2023-07-11 RX ADMIN — POTASSIUM CHLORIDE, DEXTROSE MONOHYDRATE AND SODIUM CHLORIDE: 150; 5; 450 INJECTION, SOLUTION INTRAVENOUS at 10:35

## 2023-07-11 RX ADMIN — PIPERACILLIN AND TAZOBACTAM 3.38 G: 3; .375 INJECTION, POWDER, LYOPHILIZED, FOR SOLUTION INTRAVENOUS at 21:46

## 2023-07-11 RX ADMIN — PIPERACILLIN AND TAZOBACTAM 3.38 G: 3; .375 INJECTION, POWDER, LYOPHILIZED, FOR SOLUTION INTRAVENOUS at 06:04

## 2023-07-11 RX ADMIN — POLYETHYLENE GLYCOL 3350 17 G: 17 POWDER, FOR SOLUTION ORAL at 09:24

## 2023-07-11 RX ADMIN — POTASSIUM CHLORIDE, DEXTROSE MONOHYDRATE AND SODIUM CHLORIDE: 150; 5; 450 INJECTION, SOLUTION INTRAVENOUS at 00:09

## 2023-07-11 RX ADMIN — TAMSULOSIN HYDROCHLORIDE 0.4 MG: 0.4 CAPSULE ORAL at 17:37

## 2023-07-11 RX ADMIN — SENNOSIDES AND DOCUSATE SODIUM 1 TABLET: 50; 8.6 TABLET ORAL at 21:47

## 2023-07-11 RX ADMIN — PIPERACILLIN AND TAZOBACTAM 3.38 G: 3; .375 INJECTION, POWDER, LYOPHILIZED, FOR SOLUTION INTRAVENOUS at 13:45

## 2023-07-11 RX ADMIN — ACETAMINOPHEN 1000 MG: 500 TABLET ORAL at 21:47

## 2023-07-11 RX ADMIN — POTASSIUM CHLORIDE, DEXTROSE MONOHYDRATE AND SODIUM CHLORIDE: 150; 5; 450 INJECTION, SOLUTION INTRAVENOUS at 07:54

## 2023-07-11 RX ADMIN — ATENOLOL 100 MG: 25 TABLET ORAL at 09:25

## 2023-07-11 RX ADMIN — LOSARTAN POTASSIUM 50 MG: 50 TABLET, FILM COATED ORAL at 09:25

## 2023-07-11 RX ADMIN — FAMOTIDINE 40 MG: 20 TABLET ORAL at 09:25

## 2023-07-11 RX ADMIN — SENNOSIDES AND DOCUSATE SODIUM 1 TABLET: 50; 8.6 TABLET ORAL at 09:24

## 2023-07-11 RX ADMIN — ACETAMINOPHEN 1000 MG: 500 TABLET ORAL at 13:45

## 2023-07-11 RX ADMIN — POTASSIUM CHLORIDE, DEXTROSE MONOHYDRATE AND SODIUM CHLORIDE: 150; 5; 450 INJECTION, SOLUTION INTRAVENOUS at 19:00

## 2023-07-11 ASSESSMENT — ACTIVITIES OF DAILY LIVING (ADL)
ADLS_ACUITY_SCORE: 20

## 2023-07-11 NOTE — PLAN OF CARE
Problem: Plan of Care - These are the overarching goals to be used throughout the patient stay.    Goal: Optimal Comfort and Wellbeing  Outcome: Progressing     Problem: Surgery Nonspecified  Goal: Effective Bowel Elimination  Outcome: Progressing   Goal Outcome Evaluation:    Pt stated that he has mild pain on abdomen.  Pt is independent in the room.  Voided independently in the urinal.  Pt was very excited that he had 1 soft medium size BM.  On Regular diet and good appetite.  Eating 100 % of meals.

## 2023-07-11 NOTE — PROGRESS NOTES
Progress Note    Assessment/Plan  Patient quite stable.  Some urinary retention.  We will have urology see him if this is not resolved.  Historically did have some issues getting up 2 or 3 times per night.  Minimal pain and wound.  Wound VAC change.  We will continue the same next day or 2 would have patient in wife educated so they can do this at home.    Principal Problem:    Abdominal wall abscess  Active Problems:    Essential hypertension    Psoas muscle abscess (H)      Subjective  Reasonably comfortable urinary retention noted  Objective    Vital signs in last 24 hours  Temp:  [97.3  F (36.3  C)-98.8  F (37.1  C)] 98.1  F (36.7  C)  Pulse:  [50-72] 51  Resp:  [13-24] 17  BP: (108-162)/(53-73) 120/67  SpO2:  [95 %-100 %] 100 %  Weight:   [unfilled]    Intake/Output last 3 shifts  I/O last 3 completed shifts:  In: 1450 [I.V.:1450]  Out: 1701 [Urine:1700; Blood:1]  Intake/Output this shift:  I/O this shift:  In: 1284 [P.O.:240; I.V.:1044]  Out: 400 [Urine:400]      Physical Exam  Abdomen quite benign wound VAC is changed today.    Pertinent Labs   Lab Results   Component Value Date    WBC 11.3 (H) 07/11/2023    HGB 11.1 (L) 07/11/2023    HCT 35.1 (L) 07/11/2023    MCV 89 07/11/2023     07/11/2023             Pertinent Radiology     [unfilled]        Kody Murguia MD

## 2023-07-11 NOTE — PROGRESS NOTES
[facility]  Infectious Disease   Progress Note     Date of Admission:  7/8/2023    Assessment & Plan     Right psoas abscess aspiration, E. Coli, 6/12/23  Suspected to be previous cholecystitis or sigmoid colitis  Recent antibiotic use Augmentin  Sen roberts 6/9/2022  Seen by surgeon , gareth, was doing okay     Now anterior abdominal wall, right side, abscess pocket with retained biliary stone, potentially fistula,aspirate >>LFGNR , confirming GI origin  Now s/p   1.  Removal of foreign body right mid abdominal wall 2.  Debridement skin subcutaneous tissue muscle and fascia 5 x 7 cm open packing of wound       PLAN  Continue IV Zosyn  Plan is PO abx    Jolly Watts M.D.    ______________________________________________________________________    Interval History   Postop  Feels well      Physical Exam   Vital Signs: Temp: 97.7  F (36.5  C) Temp src: Oral BP: 130/60 Pulse: 57   Resp: 18 SpO2: 99 % O2 Device: None (Room air) Oxygen Delivery: 2 LPM  Weight: 150 lbs 0 oz  Gen. appearance nontoxic  Eyes no conjunctivitis or icterus  Neck no stiffness   Heart  No edema  Lungs breathing comfortably  Abdomen soft not tender; dressed ok  Extremities no synovitis, trace edema  Skin  no rash or emboli  Neurologic alert oriented no focal deficits    Data   Results for orders placed or performed during the hospital encounter of 07/08/23 (from the past 24 hour(s))   Basic metabolic panel   Result Value Ref Range    Sodium 138 136 - 145 mmol/L    Potassium 4.4 3.4 - 5.3 mmol/L    Chloride 105 98 - 107 mmol/L    Carbon Dioxide (CO2) 23 22 - 29 mmol/L    Anion Gap 10 7 - 15 mmol/L    Urea Nitrogen 16.1 8.0 - 23.0 mg/dL    Creatinine 1.10 0.67 - 1.17 mg/dL    Calcium 10.1 8.8 - 10.2 mg/dL    Glucose 119 (H) 70 - 99 mg/dL    GFR Estimate 70 >60 mL/min/1.73m2   CBC with platelets   Result Value Ref Range    WBC Count 11.3 (H) 4.0 - 11.0 10e3/uL    RBC Count 3.95 (L) 4.40 - 5.90 10e6/uL    Hemoglobin 11.1 (L)  13.3 - 17.7 g/dL    Hematocrit 35.1 (L) 40.0 - 53.0 %    MCV 89 78 - 100 fL    MCH 28.1 26.5 - 33.0 pg    MCHC 31.6 31.5 - 36.5 g/dL    RDW 15.9 (H) 10.0 - 15.0 %    Platelet Count 387 150 - 450 10e3/uL

## 2023-07-11 NOTE — PROGRESS NOTES
Care Management Follow Up    Length of Stay (days): 3    Expected Discharge Date: 07/12/2023     Concerns to be Addressed:   Discharge planning    Patient plan of care discussed at interdisciplinary rounds: Yes    Anticipated Discharge Disposition:  Home no needs   Anticipated Discharge Services:  n/a  Anticipated Discharge DME:  n/a    Patient/family educated on Medicare website which has current facility and service quality ratings:  n/a  Education Provided on the Discharge Plan:  yes  Patient/Family in Agreement with the Plan:  yes    Referrals Placed by CM/SW:  n/a  Private pay costs discussed: Not applicable    Additional Information:  No needs anticipated from CM at discharge per pt; independent at baseline. Care management to follow for discharge recommendations and progression of care through hospitalization. Family to transport home.  8:48 AM    CAYETANO Saenz  7/11/2023

## 2023-07-11 NOTE — PLAN OF CARE
Problem: Plan of Care - These are the overarching goals to be used throughout the patient stay.    Goal: Optimal Comfort and Wellbeing  Outcome: Progressing  Intervention: Monitor Pain and Promote Comfort  Recent Flowsheet Documentation  Taken 7/10/2023 1630 by Jazmin Carr RN  Pain Management Interventions: medication (see MAR)     Problem: Surgery Nonspecified  Goal: Absence of Bleeding  Outcome: Progressing  Goal: Effective Bowel Elimination  Outcome: Progressing  Goal: Fluid and Electrolyte Balance  Outcome: Progressing  Goal: Blood Glucose Level Within Targeted Range  Outcome: Progressing  Goal: Absence of Infection Signs and Symptoms  Outcome: Progressing  Goal: Anesthesia/Sedation Recovery  Outcome: Progressing  Intervention: Optimize Anesthesia Recovery  Recent Flowsheet Documentation  Taken 7/10/2023 1630 by Jazmin Carr RN  Safety Promotion/Fall Prevention:   nonskid shoes/slippers when out of bed   activity supervised  Goal: Optimal Pain Control and Function  Outcome: Progressing  Intervention: Prevent or Manage Pain  Recent Flowsheet Documentation  Taken 7/10/2023 1630 by Jazmin Carr RN  Pain Management Interventions: medication (see MAR)  Goal: Nausea and Vomiting Relief  Outcome: Progressing  Goal: Effective Urinary Elimination  Outcome: Progressing  Goal: Effective Oxygenation and Ventilation  Outcome: Progressing  Intervention: Optimize Oxygenation and Ventilation  Recent Flowsheet Documentation  Taken 7/10/2023 1630 by Jazmin Carr RN  Head of Bed (HOB) Positioning: HOB at 20-30 degrees     Goal Outcome Evaluation:  Patient came to the floor after surgery around 2pm, came over with ABD bandage over wound with large amount of bleeding. AM nurse paged provider was told to use 4x4, soaking through 4x4 every 30 minutes I paged the provider again to inform them of the large amount of bleeding, provider came in and found two small sources of bleeding inside the wound,  placed a few stitches and repacked the wound, dressing clean dry and intact for the rest of shift, patient had mild pain after and said the scheduled Tylenol helped bring the pain down to about a 2-3 which was tolerable. Bladder scanned patient at 2145 and bladder had 400mL, patient asked to go to bathroom and voided 150mL, per bladder scan protocol will bladder scan again in 2 hours.       Plan of Care Reviewed With: patient    Overall Patient Progress: improvingOverall Patient Progress: improving

## 2023-07-11 NOTE — PLAN OF CARE
Problem: Risk for Delirium  Goal: Improved Sleep  Outcome: Progressing     Problem: Surgery Nonspecified  Goal: Optimal Pain Control and Function  Outcome: Progressing  Goal: Effective Urinary Elimination  Outcome: Progressing   Goal Outcome Evaluation:    A/Ox4. VSS. No pain or nausea reported. Ambulating to bathroom and urinating independently, using urinal for measurement. Bladder scan and recheck were 413 and then 398 ml urine, straight catherized x1 and voided 550 ml urine. No drainage noted on abdominal dressing. Pt reported poor sleep overnight.    Rick Pena RN

## 2023-07-11 NOTE — PROGRESS NOTES
"[facility]    PROGRESS NOTE - Hospitalist Service    Assessment and Plan    Principal Problem:    Abdominal wall abscess  Active Problems:    Essential hypertension    Psoas muscle abscess (H)    Willian Reid is a 73 year old male with h/o asthma, GERD, and prior cholecystectomy with complication of retained stone and pancreatitis as well as right psoas abscess that grew E. coli 6/12/2023 who presents with increasing right-sided abdominal pain and is found to have new abdominal wall abscess.   Per chart review: patient with history of cholecystectomy on 6/9/22. Presented on 7/10/22 with recurring abdominal pain and lipase 84616 / total bilirubin 4.3 / alk phos 294 /  / . CT abdomen & pelvis 7/10/22 demonstrates \"uncomplicated acute pancreatitis.\" MRCP 7/11/22 demonstrates \"acute pancreatitis without complications, not significantly changed since CT yesterday [7/10/22]. No biliary ductal dilatation or choledocholithiasis.\" Triglycerides within normal limits (79).  Cause of acute pancreatitis appears to be due to biliary obstruction either from stone that has since passed, or sludge. Patient then developed psoas muscle abscess on 5/06/23. He was treated with antibiotics but subsequently had CT drainage per procedure note of June 12, 2023     Right psoas abscess aspiration, E. coli 6-12/23.  S/p 2 weeks worth of Augmentin prior to hospitalization.  Now anterior abdominal wall right side abscess pocket with retained biliary stone, potentially fistula, s/p retained foreign body.  I&D on 7/10.  Open wound with packing.  S/p US guided drainage with culture 7/9/2023.  ID following, recommended continue IV Zosyn with plan to transition to p.o. antibiotics.  Pain control with Tylenol, Dilaudid     Essential hypertension- atenolol and losartan with holding parameters.     History of uncomplicated asthma-home inhalers.     GERD-Pepcid.     Mild hypercalcemia: stable from previous. " "Calcium is barely elevated if corrected for albumin of 4.3    Urinary tension.  Straight catheterization yesterday located 500 mL urine./On nocturnal voiding 2-3 times.  No known history of BPH.  Checking PSA, added Flomax, measuring postvoid residuals.  Straight cath for more than 400 mL, and if requiring additional straight cath, will ask for urology consult.    Clinically Significant Risk Factors      # Overweight: Estimated body mass index is 28.31 kg/m  as calculated from the following:  Height as of this encounter: 1.626 m (5' 4\").  Weight as of this encounter: 74.8 kg (164 lb 14.4 oz)., PRESENT ON ADMISSION    COVID-19 PCR Results        6/9/2022    13:20 7/10/2022    19:17   COVID-19 PCR Results   SARS CoV2 PCR Negative  Negative    COVID-19 Antibody Results, Testing for Immunity         No data to display               Code Status: Full Code  VTE prophylaxis:  SCDs and ambulate, post op so surgery to okay when can start meds   DIET: Orders Placed This Encounter      Advance Diet as Tolerated: Full Liquid Diet      Regular Diet Adult    Drains/Lines: none  Weight bearing status: WBAT     Expected Discharge Date: 07/12/2023      Destination: home with family      Subjective:  Patient is new to me.  Chart reviewed.  Patient was seen and examined.  Dr. Walker and patient's RN at the bedside.  POD #1.  Abdominal wall wound was repacked by surgeon.      PHYSICAL EXAM  Temp:  [97.3  F (36.3  C)-98.8  F (37.1  C)] 98.1  F (36.7  C)  Pulse:  [50-72] 51  Resp:  [13-24] 17  BP: (108-162)/(53-73) 120/67  SpO2:  [95 %-100 %] 100 %  Wt Readings from Last 1 Encounters:   07/10/23 66.2 kg (145 lb 15.1 oz)       Intake/Output Summary (Last 24 hours) at 7/9/2023 0825  Last data filed at 7/8/2023 1457  Gross per 24 hour   Intake 100 ml   Output --   Net 100 ml      Body mass index is 25.05 kg/m .    Physical Exam  Constitutional:       General: He is not in acute distress.     Appearance: Normal appearance. He is not " ill-appearing.   HENT:      Head: Normocephalic and atraumatic.   Cardiovascular:      Rate and Rhythm: Normal rate and regular rhythm.      Pulses: Normal pulses.   Pulmonary:      Effort: Pulmonary effort is normal.      Breath sounds: Normal breath sounds.   Abdominal:      Comments: Mild distension, hypoactive BS, wound bandaged and packed per nursing    Musculoskeletal:         General: Normal range of motion.   Skin:     General: Skin is warm and dry.      Capillary Refill: Capillary refill takes less than 2 seconds.   Neurological:      General: No focal deficit present.      Mental Status: He is alert and oriented to person, place, and time. Mental status is at baseline.         PERTINENT LABS/IMAGING:  Results for orders placed or performed during the hospital encounter of 07/08/23   CT Abdomen Pelvis w Contrast    Impression    IMPRESSION:   1.  Small fluid and inflammatory changes noted within the right anterior abdominal wall measuring approximately 3 x 2.6 x 2.7 cm concerning for phlegmon/abscess. This probably is too small for drainage. Additional inflammatory change noted along the   right paracolic gutter and right iliopsoas muscle which contains small fluid and small foci of air. No drainable fluid within this area.     Dianna Blandon MD

## 2023-07-12 PROBLEM — R33.9 URINARY RETENTION: Status: ACTIVE | Noted: 2023-07-12

## 2023-07-12 PROCEDURE — 250N000013 HC RX MED GY IP 250 OP 250 PS 637: Performed by: INTERNAL MEDICINE

## 2023-07-12 PROCEDURE — 99232 SBSQ HOSP IP/OBS MODERATE 35: CPT | Performed by: INTERNAL MEDICINE

## 2023-07-12 PROCEDURE — 250N000013 HC RX MED GY IP 250 OP 250 PS 637: Performed by: SURGERY

## 2023-07-12 PROCEDURE — 250N000011 HC RX IP 250 OP 636: Mod: JZ | Performed by: INTERNAL MEDICINE

## 2023-07-12 PROCEDURE — 120N000001 HC R&B MED SURG/OB

## 2023-07-12 PROCEDURE — 250N000011 HC RX IP 250 OP 636: Mod: JZ

## 2023-07-12 RX ORDER — TAMSULOSIN HYDROCHLORIDE 0.4 MG/1
0.4 CAPSULE ORAL
Qty: 30 CAPSULE | Refills: 0 | Status: SHIPPED | OUTPATIENT
Start: 2023-07-13 | End: 2023-07-21

## 2023-07-12 RX ORDER — AMOXICILLIN 250 MG
1 CAPSULE ORAL 2 TIMES DAILY
Qty: 20 TABLET | Refills: 0 | COMMUNITY
Start: 2023-07-12 | End: 2023-07-21

## 2023-07-12 RX ORDER — CEFDINIR 300 MG/1
300 CAPSULE ORAL EVERY 12 HOURS SCHEDULED
Status: DISCONTINUED | OUTPATIENT
Start: 2023-07-13 | End: 2023-07-13 | Stop reason: HOSPADM

## 2023-07-12 RX ORDER — HYDROMORPHONE HYDROCHLORIDE 2 MG/1
2 TABLET ORAL EVERY 4 HOURS PRN
Qty: 12 TABLET | Refills: 0 | Status: SHIPPED | OUTPATIENT
Start: 2023-07-12 | End: 2023-07-21

## 2023-07-12 RX ORDER — CEFDINIR 300 MG/1
300 CAPSULE ORAL EVERY 12 HOURS
Qty: 13 CAPSULE | Refills: 0 | Status: SHIPPED | OUTPATIENT
Start: 2023-07-13 | End: 2023-07-21

## 2023-07-12 RX ORDER — POLYETHYLENE GLYCOL 3350 17 G/17G
17 POWDER, FOR SOLUTION ORAL DAILY
Qty: 510 G | Refills: 0 | COMMUNITY
Start: 2023-07-12 | End: 2023-07-21

## 2023-07-12 RX ORDER — ACETAMINOPHEN 650 MG/1
650 SUPPOSITORY RECTAL EVERY 6 HOURS PRN
COMMUNITY
Start: 2023-07-12 | End: 2024-01-03

## 2023-07-12 RX ADMIN — TAMSULOSIN HYDROCHLORIDE 0.4 MG: 0.4 CAPSULE ORAL at 17:12

## 2023-07-12 RX ADMIN — ACETAMINOPHEN 1000 MG: 500 TABLET ORAL at 09:14

## 2023-07-12 RX ADMIN — PIPERACILLIN AND TAZOBACTAM 3.38 G: 3; .375 INJECTION, POWDER, LYOPHILIZED, FOR SOLUTION INTRAVENOUS at 06:01

## 2023-07-12 RX ADMIN — PIPERACILLIN AND TAZOBACTAM 3.38 G: 3; .375 INJECTION, POWDER, LYOPHILIZED, FOR SOLUTION INTRAVENOUS at 14:32

## 2023-07-12 RX ADMIN — SENNOSIDES AND DOCUSATE SODIUM 1 TABLET: 50; 8.6 TABLET ORAL at 09:14

## 2023-07-12 RX ADMIN — ATENOLOL 100 MG: 25 TABLET ORAL at 09:14

## 2023-07-12 RX ADMIN — FAMOTIDINE 40 MG: 20 TABLET ORAL at 09:14

## 2023-07-12 RX ADMIN — ACETAMINOPHEN 1000 MG: 500 TABLET ORAL at 13:52

## 2023-07-12 RX ADMIN — ACETAMINOPHEN 1000 MG: 500 TABLET ORAL at 19:36

## 2023-07-12 RX ADMIN — POLYETHYLENE GLYCOL 3350 17 G: 17 POWDER, FOR SOLUTION ORAL at 09:13

## 2023-07-12 RX ADMIN — PIPERACILLIN AND TAZOBACTAM 3.38 G: 3; .375 INJECTION, POWDER, LYOPHILIZED, FOR SOLUTION INTRAVENOUS at 21:19

## 2023-07-12 RX ADMIN — HYDRALAZINE HYDROCHLORIDE 10 MG: 20 INJECTION INTRAMUSCULAR; INTRAVENOUS at 17:20

## 2023-07-12 RX ADMIN — LOSARTAN POTASSIUM 50 MG: 50 TABLET, FILM COATED ORAL at 09:14

## 2023-07-12 ASSESSMENT — ACTIVITIES OF DAILY LIVING (ADL)
ADLS_ACUITY_SCORE: 20

## 2023-07-12 NOTE — PLAN OF CARE
Problem: Risk for Delirium  Goal: Improved Sleep  Outcome: Progressing     Problem: Surgery Nonspecified  Goal: Optimal Pain Control and Function  Outcome: Progressing  Goal: Effective Urinary Elimination  Outcome: Progressing   Goal Outcome Evaluation:    VSS, HR remains bradycardic. On tele, interpreted as sinus clare. No pain reported. Ambulated to bathroom independently. Good urine output. Passed loose stool x1. PIV in right AC removed due to leaking at site. Slept between cares.     Rick Pena RN

## 2023-07-12 NOTE — PROGRESS NOTES
Care Management Follow Up    Length of Stay (days): 4    Expected Discharge Date: 07/13/2023     Concerns to be Addressed:   Discharge planning    Patient plan of care discussed at interdisciplinary rounds: Yes    Anticipated Discharge Disposition:  Home no needs   Anticipated Discharge Services:  n/a  Anticipated Discharge DME:  n/a    Patient/family educated on Medicare website which has current facility and service quality ratings:  n/a  Education Provided on the Discharge Plan:  yes  Patient/Family in Agreement with the Plan:  yes    Referrals Placed by CM/SW:  n/a  Private pay costs discussed: Not applicable    Additional Information:  No needs anticipated from CM at discharge per pt; independent at baseline. Care management to follow for discharge recommendations and progression of care through hospitalization. Family to transport home.  12:19 PM    CAYETANO Saenz  7/12/2023

## 2023-07-12 NOTE — PLAN OF CARE
Problem: Plan of Care - These are the overarching goals to be used throughout the patient stay.    Goal: Absence of Hospital-Acquired Illness or Injury  Intervention: Prevent Skin Injury  Recent Flowsheet Documentation  Taken 7/11/2023 1530 by Jazmin Carr RN  Body Position: position changed independently     Problem: Surgery Nonspecified  Goal: Absence of Bleeding  Outcome: Progressing  Goal: Effective Bowel Elimination  Outcome: Progressing  Goal: Fluid and Electrolyte Balance  Outcome: Progressing  Goal: Blood Glucose Level Within Targeted Range  Outcome: Progressing  Goal: Absence of Infection Signs and Symptoms  Outcome: Progressing  Goal: Anesthesia/Sedation Recovery  Outcome: Progressing  Intervention: Optimize Anesthesia Recovery  Recent Flowsheet Documentation  Taken 7/11/2023 1530 by Jazmin Carr RN  Safety Promotion/Fall Prevention:   activity supervised   nonskid shoes/slippers when out of bed   patient and family education  Goal: Optimal Pain Control and Function  Outcome: Progressing  Goal: Nausea and Vomiting Relief  Outcome: Progressing  Goal: Effective Urinary Elimination  Outcome: Progressing  Goal: Effective Oxygenation and Ventilation  Outcome: Progressing  Intervention: Optimize Oxygenation and Ventilation  Recent Flowsheet Documentation  Taken 7/11/2023 1530 by Jazmin Carr RN  Head of Bed (HOB) Positioning: HOB at 30 degrees     Goal Outcome Evaluation:  Patient had good urine output, walked the unit twice during shift, was placed on tele as HR is dropping down to 46 regularly. Tolerating regular diet had a bowel movement earlier today.       Plan of Care Reviewed With: patient    Overall Patient Progress: improvingOverall Patient Progress: improving

## 2023-07-12 NOTE — PLAN OF CARE
Problem: Plan of Care - These are the overarching goals to be used throughout the patient stay.    Goal: Optimal Comfort and Wellbeing  Outcome: Progressing   Goal Outcome Evaluation:    Denies pain. Had couple of visitors. Ambulated in the hallway couple of times.  Voided independently in the urinal.  Pt reported BM x 1

## 2023-07-12 NOTE — PROGRESS NOTES
[facility]  Infectious Disease   Progress Note     Date of Admission:  7/8/2023    Assessment & Plan     Right psoas abscess aspiration, E. Coli, 6/12/23  Suspected to be previous cholecystitis or sigmoid colitis  Recent antibiotic use Augmentin  Lap choly 6/9/2022  Seen by surgeon , gareth, was doing okay     Now anterior abdominal wall, right side, abscess pocket with retained biliary stone, potentially fistula,aspirate >>LFGNR , confirming GI origin  Now s/p   1.  Removal of foreign body right mid abdominal wall 2.  Debridement skin subcutaneous tissue muscle and fascia 5 x 7 cm open packing of wound       PLAN  Zosyn  Ok to switch to PO cefdinir 300 BID  x7 days    Will sign off, please call with questions      Jolly Watts M.D.    ______________________________________________________________________    Interval History   Postop  Feels well    All 12 systems reviewed, negative except for the above.      Physical Exam   Vital Signs: Temp: 97.7  F (36.5  C) Temp src: Oral BP: 130/70 Pulse: 59   Resp: 18 SpO2: 97 % O2 Device: None (Room air)    Weight: 150 lbs 0 oz  Gen. appearance nontoxic  Eyes no conjunctivitis or icterus  Neck no stiffness   Heart  No edema  Lungs breathing comfortably  Abdomen soft not tender; wound examined- wet packing, no redness no tenderness  Extremities no synovitis, trace edema  Skin  no rash or emboli  Neurologic alert oriented no focal deficits    Data   No results found for this or any previous visit (from the past 24 hour(s)).

## 2023-07-12 NOTE — PROGRESS NOTES
"[facility]    PROGRESS NOTE - Hospitalist Service    Assessment and Plan    Principal Problem:   Abdominal wall abscess  Active Problems:    Essential hypertension    Psoas muscle abscess (H)    Urinary retention    Willian Reid is a 73 year old male with h/o asthma, GERD, and prior cholecystectomy with complication of retained stone and pancreatitis as well as right psoas abscess that grew E. coli 6/12/2023 who presents with increasing right-sided abdominal pain and is found to have new abdominal wall abscess.   Per chart review: patient with history of cholecystectomy on 6/9/22. Presented on 7/10/22 with recurring abdominal pain and lipase 88475 / total bilirubin 4.3 / alk phos 294 /  / . CT abdomen & pelvis 7/10/22 demonstrates \"uncomplicated acute pancreatitis.\" MRCP 7/11/22 demonstrates \"acute pancreatitis without complications, not significantly changed since CT yesterday [7/10/22]. No biliary ductal dilatation or choledocholithiasis.\" Triglycerides within normal limits (79).  Cause of acute pancreatitis appears to be due to biliary obstruction either from stone that has since passed, or sludge. Patient then developed psoas muscle abscess on 5/06/23. He was treated with antibiotics but subsequently had CT drainage per procedure note of June 12, 2023     Right psoas abscess aspiration, E. coli 6-12/23.  S/p 2 weeks worth of Augmentin prior to hospitalization.  Now anterior abdominal wall right side abscess pocket with retained biliary stone, potentially fistula, s/p retained foreign body.  I&D on 7/10.  Open wound with packing.  S/p US guided drainage with culture 7/9/2023.  ID following, recommended continue IV Zosyn with plan to transition to p.o. antibiotics.  Pain control with Tylenol, Dilaudid     Essential hypertension- atenolol and losartan with holding parameters.     History of uncomplicated asthma-home inhalers.     GERD-Pepcid.     Mild hypercalcemia: stable " "from previous. Calcium is barely elevated if corrected for albumin of 4.3    Urinary retention.  Straight catheterization yesterday located 500 mL urine./On nocturnal voiding 2-3 times.  No known history of BPH.  7/12 checked normal PSA.  On Flomax since 7/11.  No recurrence of urine retention.  -Straight cath for more than 400 mL, and if requiring additional straight cath, will ask for urology consult.    Clinically Significant Risk Factors      # Overweight: Estimated body mass index is 28.31 kg/m  as calculated from the following:  Height as of this encounter: 1.626 m (5' 4\").  Weight as of this encounter: 74.8 kg (164 lb 14.4 oz)., PRESENT ON ADMISSION    COVID-19 PCR Results        6/9/2022    13:20 7/10/2022    19:17   COVID-19 PCR Results   SARS CoV2 PCR Negative  Negative    COVID-19 Antibody Results, Testing for Immunity         No data to display               Code Status: Full Code  VTE prophylaxis:  SCDs and ambulate, post op so surgery to okay when can start meds   DIET: Orders Placed This Encounter      Advance Diet as Tolerated: Full Liquid Diet      Regular Diet Adult  Drains/Lines: none  Weight bearing status: WBAT  Expected Discharge Date: 07/13/2023      Destination: home with family  Discharge Comments: wound vac    Subjective:  Patient is new to me.  Chart reviewed.  Patient was seen and examined.  Dr. Walker and patient's RN at the bedside.  POD #2.  Abdominal wall wound was repacked by surgeon.  D/W Dr. Lloyd, in his opinion wound is healing well.  Plan to change to cefdinir tomorrow and discharge patient home.    PHYSICAL EXAM  Temp:  [97.5  F (36.4  C)-98.6  F (37  C)] 97.5  F (36.4  C)  Pulse:  [51-63] 51  Resp:  [18-20] 19  BP: (118-172)/(58-81) 163/72  SpO2:  [97 %-98 %] 98 %  Wt Readings from Last 1 Encounters:   07/11/23 68 kg (150 lb)     Intake/Output Summary (Last 24 hours) at 7/9/2023 0825  Last data filed at 7/8/2023 1457  Gross per 24 hour   Intake 100 ml   Output --   Net 100 " ml      Body mass index is 25.75 kg/m .     Physical Exam  Constitutional:       General: He is not in acute distress.     Appearance: Normal appearance. He is not ill-appearing.   HENT:      Head: Normocephalic and atraumatic.   Cardiovascular:      Rate and Rhythm: Normal rate and regular rhythm.      Pulses: Normal pulses.   Pulmonary:      Effort: Pulmonary effort is normal.      Breath sounds: Normal breath sounds.   Abdominal:      Comments: Mild distension, hypoactive BS, wound bandaged and packed per nursing    Musculoskeletal:         General: Normal range of motion.   Skin:     General: Skin is warm and dry.      Capillary Refill: Capillary refill takes less than 2 seconds.   Neurological:      General: No focal deficit present.      Mental Status: He is alert and oriented to person, place, and time. Mental status is at baseline.     PERTINENT LABS/IMAGING:  Results for orders placed or performed during the hospital encounter of 07/08/23   CT Abdomen Pelvis w Contrast    Impression    IMPRESSION:   1.  Small fluid and inflammatory changes noted within the right anterior abdominal wall measuring approximately 3 x 2.6 x 2.7 cm concerning for phlegmon/abscess. This probably is too small for drainage. Additional inflammatory change noted along the   right paracolic gutter and right iliopsoas muscle which contains small fluid and small foci of air. No drainable fluid within this area.     Dianna Blandon MD

## 2023-07-12 NOTE — PROGRESS NOTES
Progress Note    Assessment/Plan  Patient doing quite well.  Wound VAC changed today.  We will have family do it tomorrow and do his own home care.  Plan discharge tomorrow afternoon on oral antibiotic.    Principal Problem:    Abdominal wall abscess  Active Problems:    Essential hypertension    Psoas muscle abscess (H)      Subjective  Comfortable  Objective    Vital signs in last 24 hours  Temp:  [97.7  F (36.5  C)-98.6  F (37  C)] 97.7  F (36.5  C)  Pulse:  [55-63] 59  Resp:  [18-20] 18  BP: (118-148)/(58-70) 130/70  SpO2:  [97 %-100 %] 97 %  Weight:   [unfilled]    Intake/Output last 3 shifts  I/O last 3 completed shifts:  In: 2221 [P.O.:720; I.V.:1501]  Out: 2925 [Urine:2925]  Intake/Output this shift:  I/O this shift:  In: 871 [P.O.:240; I.V.:631]  Out: 400 [Urine:400]      Physical Exam  Abdomen benign incision doing well.    Pertinent Labs   Lab Results   Component Value Date    WBC 11.3 (H) 07/11/2023    HGB 11.1 (L) 07/11/2023    HCT 35.1 (L) 07/11/2023    MCV 89 07/11/2023     07/11/2023             Pertinent Radiology     [unfilled]        Kody Murguia MD

## 2023-07-13 VITALS
BODY MASS INDEX: 25.44 KG/M2 | WEIGHT: 149 LBS | HEIGHT: 64 IN | TEMPERATURE: 97.5 F | HEART RATE: 63 BPM | SYSTOLIC BLOOD PRESSURE: 152 MMHG | RESPIRATION RATE: 18 BRPM | OXYGEN SATURATION: 98 % | DIASTOLIC BLOOD PRESSURE: 72 MMHG

## 2023-07-13 LAB
BACTERIA ABSC ANAEROBE+AEROBE CULT: ABNORMAL
BACTERIA BLD CULT: NO GROWTH
BACTERIA BLD CULT: NO GROWTH
GLUCOSE BLDC GLUCOMTR-MCNC: 89 MG/DL (ref 70–99)

## 2023-07-13 PROCEDURE — 250N000013 HC RX MED GY IP 250 OP 250 PS 637: Performed by: SURGERY

## 2023-07-13 PROCEDURE — 250N000013 HC RX MED GY IP 250 OP 250 PS 637: Performed by: INTERNAL MEDICINE

## 2023-07-13 RX ADMIN — ACETAMINOPHEN 1000 MG: 500 TABLET ORAL at 08:48

## 2023-07-13 RX ADMIN — CEFDINIR 300 MG: 300 CAPSULE ORAL at 08:47

## 2023-07-13 RX ADMIN — ATENOLOL 100 MG: 25 TABLET ORAL at 08:45

## 2023-07-13 RX ADMIN — FAMOTIDINE 40 MG: 20 TABLET ORAL at 08:46

## 2023-07-13 RX ADMIN — ACETAMINOPHEN 650 MG: 325 TABLET ORAL at 05:40

## 2023-07-13 RX ADMIN — LOSARTAN POTASSIUM 50 MG: 50 TABLET, FILM COATED ORAL at 08:47

## 2023-07-13 ASSESSMENT — ACTIVITIES OF DAILY LIVING (ADL)
ADLS_ACUITY_SCORE: 20
ADLS_ACUITY_SCORE: 26
ADLS_ACUITY_SCORE: 20

## 2023-07-13 NOTE — PLAN OF CARE
Problem: Risk for Delirium  Goal: Optimal Coping  Outcome: Progressing   Pt has been pleasant, alert and oriented x 4.  Problem: Surgery Nonspecified  Goal: Effective Urinary Elimination  Outcome: Progressing   Voiding well. Urinal at bedside.  Problem: Surgery Nonspecified  Goal: Effective Oxygenation and Ventilation  Outcome: Progressing   Spo2 97% RA  Problem: Hypertension Comorbidity  Goal: Blood Pressure in Desired Range  Outcome: Progressing  PRN hydralazine given per parameter.  Latest PG=565/70.

## 2023-07-13 NOTE — DISCHARGE SUMMARY
"[facility]  Hospitalist Discharge Summary    Date of Admission:  7/8/2023  Date of Discharge:  7/13/2023  Discharging Provider: Dianna Blandon MD  Discharge Service: Hospitalist Service  Discharge Diagnoses   Renal abscess  Psoas muscle abscess  Essential hypertension  Urinary retention  Essential hypertension  Clinically Significant Risk Factors     # Overweight: Estimated body mass index is 25.75 kg/m  as calculated from the following:    Height as of this encounter: 1.626 m (5' 4\").    Weight as of this encounter: 68 kg (150 lb).       Follow-ups Needed After Discharge   Follow-up Appointments    Follow-up and recommended labs and tests      Follow up with primary care provider, Amy Chandler, within 7 days   to evaluate medication change, to evaluate treatment change, to evaluate   after surgery, and for hospital follow- up.  The following labs/tests are   recommended: CBC, BMP.    Follow-up with Dr Barillas, general surgery, in 7-10 days.  Wound care-per surgery.      Unresulted Labs Ordered in the Past 30 Days of this Admission     Date and Time Order Name Status Description    7/10/2023 11:33 AM Anaerobic Bacterial Culture Routine Preliminary     7/8/2023  4:57 PM Fungal or Yeast Culture Routine Preliminary     7/8/2023  4:07 PM Anaerobic Bacterial Culture Routine Preliminary     7/8/2023 12:45 PM Blood Culture Peripheral Blood Preliminary     7/8/2023 12:45 PM Blood Culture Line, venous Preliminary       These results will be followed up by me    Discharge Disposition   Discharged to home  Condition at discharge: Stable    Hospital Course   Willian Reid is a 73 year old male with h/o asthma, GERD, and prior cholecystectomy with complication of retained stone and pancreatitis as well as right psoas abscess that grew E. coli 6/12/2023 who presents with increasing right-sided abdominal pain and is found to have new abdominal wall abscess.   Per chart review: patient " "with history of cholecystectomy on 6/9/22. Presented on 7/10/22 with recurring abdominal pain and lipase 01658 / total bilirubin 4.3 / alk phos 294 /  / . CT abdomen & pelvis 7/10/22 demonstrates \"uncomplicated acute pancreatitis.\" MRCP 7/11/22 demonstrates \"acute pancreatitis without complications, not significantly changed since CT yesterday [7/10/22]. No biliary ductal dilatation or choledocholithiasis.\" Triglycerides within normal limits (79).  Cause of acute pancreatitis appears to be due to biliary obstruction either from stone that has since passed, or sludge. Patient then developed psoas muscle abscess on 5/06/23. He was treated with antibiotics but subsequently had CT drainage per procedure note of June 12, 2023     Right psoas abscess aspiration, E. coli 6-12/23.  S/p 2 weeks worth of Augmentin prior to hospitalization.  Now anterior abdominal wall right side abscess pocket with retained biliary stone, potentially fistula, s/p retained foreign body.  I&D on 7/10.  Open wound with packing.  S/p US guided drainage with culture 7/9/2023.  ID following.  Initially treated with IV Zosyn, transition to cefdinir.  Pain controlled with p.o. Tylenol, infrequent doses of Dilaudid.     Essential hypertension- continued PTA atenolol and losartan.     History of uncomplicated asthma-home inhalers.     GERD-Pepcid.     Mild hypercalcemia: stable from previous. Calcium is barely elevated if corrected for albumin of 4.3     Urinary retention.  Straight catheterization yesterday located 500 mL urine./On nocturnal voiding 2-3 times.  No known history of BPH.  7/12 checked normal PSA.  On Flomax since 7/11.  No recurrence of urine retention.     Clinically Significant Risk Factors       # Overweight: Estimated body mass index is 28.31 kg/m  as calculated from the following:  Height as of this encounter: 1.626 m (5' 4\").    Consultations This Hospital Stay   INFECTIOUS DISEASES IP CONSULT  INTERVENTIONAL " RADIOLOGY ADULT/PEDS IP CONSULT  SURGERY GENERAL IP CONSULT    Code Status   Full Code    Time Spent on this Encounter   I, Dianna Blandon MD, personally saw the patient today and spent greater than 30 minutes discharging this patient.    Dianna Blandon MD  [address] [address]  Phone: [phone]  Fax: [phone]  ______________________________________________________________________    Physical Exam   Vital Signs: Temp: 97.9  F (36.6  C) Temp src: Oral BP: (!) 147/67 Pulse: 58   Resp: 20 SpO2: 97 % O2 Device: None (Room air)    Weight: 150 lbs 0 oz  General: Alert and oriented x 3. Not in obvious distress.  HEENT: NC, AT. Neck- supple, No JVP elevation, lymphadenopathy or thyromegaly. Trachea-central.  Chest: Clear to auscultation bilaterally.  Heart: S1S2 regular. No M/R/G.  Abdomen: Soft. NT, ND. No organomegaly. Bowel sounds- active.  Abdominal wound packed with wet-to-dry dressing, this was not removed.  Back: No spine tenderness. No CVA tenderness.  Extremities: No leg swelling. Peripheral pulses 2+ bilaterally.  Neuro: Cranial nerves 1-12 grossly normal. No focal neurological deficit     Primary Care Physician   Amy Chandler    Discharge Orders      Reason for your hospital stay    Abdominal wall foreign body with an abscess     Activity    Your activity upon discharge: activity as tolerated     Follow-up and recommended labs and tests     Follow up with primary care provider, Amy Chandler, within 7 days to evaluate medication change, to evaluate treatment change, to evaluate after surgery, and for hospital follow- up.  The following labs/tests are recommended: CBC, BMP.    Follow-up with Dr Barillas, general surgery, in 7-10 days.  Wound care-per surgery.     Diet    Follow this diet upon discharge:     Regular Diet Adult     Significant Results and Procedures   Results for orders placed or performed during the hospital  encounter of 07/08/23   CT Abdomen Pelvis w Contrast    Narrative    EXAM: CT ABDOMEN PELVIS W CONTRAST  LOCATION: [facility]  DATE: 7/8/2023    INDICATION: yudy, h o psoas abscess, R abd mass tenderness  COMPARISON: 07/10/2022  TECHNIQUE: CT scan of the abdomen and pelvis was performed following injection of IV contrast. Multiplanar reformats were obtained. Dose reduction techniques were used.  CONTRAST: ISOVUE 370 75ML    FINDINGS:   LOWER CHEST: Small calcified granuloma right lung base.    HEPATOBILIARY: Liver within normal limits. Cholecystectomy.    PANCREAS: Normal.    SPLEEN: Normal.    ADRENAL GLANDS: Normal.    KIDNEYS/BLADDER: Bilateral lateral renal cysts which are benign and needs no further follow-up. Kidneys are otherwise unremarkable with no evidence of hydronephrosis.    BOWEL: Surgical changes are noted within the bowel. No evidence of obstruction. Diverticulosis with no evidence of diverticulitis.    LYMPH NODES: Normal.    VASCULATURE: Mild atherosclerotic disease of the abdominal aorta and its branches.    PELVIC ORGANS: Bladder within normal limits.  Small inflammatory change and fluid is noted along the right paracolic gutter and right iliopsoas muscle. Additional small inflammatory change and fluid is noted within the right anterior abdominal wall   measuring approximately 2.7 x 2.6 x 3 cm.    MUSCULOSKELETAL: Degenerative changes of the spine.      Impression    IMPRESSION:   1.  Small fluid and inflammatory changes noted within the right anterior abdominal wall measuring approximately 3 x 2.6 x 2.7 cm concerning for phlegmon/abscess. This probably is too small for drainage. Additional inflammatory change noted along the   right paracolic gutter and right iliopsoas muscle which contains small fluid and small foci of air. No drainable fluid within this area.   US Drainage Seroma/Hematoma Abscess/Cyst    Narrative    EXAM:  1. PUNCTURE AND ASPIRATION RIGHT LOWER  ABDOMINAL WALL ABSCESS  2. ULTRASOUND GUIDANCE  LOCATION: [facility]  DATE: 7/9/2023    INDICATION: Abdominal wall abscess    PROCEDURE: The 07/08/2023 CT abdomen and pelvis reviewed. Preliminary ultrasound demonstrates a complex fluid collection within the substance of the muscular right lower quadrant anterior abdominal wall measures about 6.7 x 3.4 x 2.3 cm and in its   inferior aspect contains a 0.6 cm echogenic structure that could be a biliary stone fragment. Informed consent obtained. Time out performed. The site was prepped and draped in sterile fashion. 10 mL of 1% lidocaine was infused into the local soft   tissues. Under direct ultrasound guidance, a 5 [employer] catheter was placed into the fluid pocket and only thick tan purulent appearing fluid could be aspirated. This was sent for cultures.       Impression    IMPRESSION:  1.  Status post ultrasound-guided puncture and aspiration of right lower quadrant anterior abdominal wall abscess.    Reference CPT Code: 40843, 20848       Discharge Medications   Current Discharge Medication List      START taking these medications    Details   acetaminophen (TYLENOL) 650 MG suppository Place 1 suppository (650 mg) rectally every 6 hours as needed for mild pain or other (and adjunct with moderate or severe pain or per patient request)    Associated Diagnoses: Abdominal wall abscess      cefdinir (OMNICEF) 300 MG capsule Take 1 capsule (300 mg) by mouth every 12 hours for 13 doses  Qty: 13 capsule, Refills: 0    Associated Diagnoses: Abdominal wall abscess      HYDROmorphone (DILAUDID) 2 MG tablet Take 1 tablet (2 mg) by mouth every 4 hours as needed for severe pain  Qty: 12 tablet, Refills: 0    Associated Diagnoses: Abdominal wall abscess      polyethylene glycol (MIRALAX) 17 GM/Dose powder Take 17 g by mouth daily for 10 days  Qty: 510 g, Refills: 0    Associated Diagnoses: Abdominal wall abscess      senna-docusate  (SENOKOT-S/PERICOLACE) 8.6-50 MG tablet Take 1 tablet by mouth 2 times daily for 10 days  Qty: 20 tablet, Refills: 0    Associated Diagnoses: Abdominal wall abscess      tamsulosin (FLOMAX) 0.4 MG capsule Take 1 capsule (0.4 mg) by mouth daily (with dinner) for 30 days  Qty: 30 capsule, Refills: 0    Associated Diagnoses: Urinary retention         CONTINUE these medications which have NOT CHANGED    Details   acetaminophen (TYLENOL) 500 MG tablet Take 1,000 mg by mouth 3 times daily      albuterol (PROAIR HFA/PROVENTIL HFA/VENTOLIN HFA) 108 (90 Base) MCG/ACT inhaler Inhale 2 puffs into the lungs every 4 hours as needed for shortness of breath or wheezing  Qty: 18 g, Refills: 11    Comments: Pharmacy may dispense brand covered by insurance (Proair, or proventil or ventolin or generic albuterol inhaler). Profile Rx: patient will contact pharmacy when needed  Associated Diagnoses: Seasonal allergic rhinitis, unspecified trigger      atenolol (TENORMIN) 100 MG tablet Take 1 tablet (100 mg) by mouth daily  Qty: 90 tablet, Refills: 3    Comments: Profile Rx: patient will contact pharmacy when needed  Associated Diagnoses: Essential hypertension      famotidine (PEPCID) 40 MG tablet Take 1 tablet (40 mg) by mouth daily  Qty: 90 tablet, Refills: 3    Comments: Profile Rx: patient will contact pharmacy when needed  Associated Diagnoses: Gastroesophageal reflux disease without esophagitis      losartan (COZAAR) 50 MG tablet Take 1 tablet (50 mg) by mouth daily  Qty: 90 tablet, Refills: 3    Comments: Profile Rx: patient will contact pharmacy when needed  Associated Diagnoses: Essential hypertension      MULTIVITAMINS OR TABS Take 1 tablet by mouth daily  Qty: 100, Refills: 3    Associated Diagnoses: Hypertension      Probiotic Product (PRO-BIOTIC BLEND) CAPS Take 1 capsule by mouth daily      psyllium (METAMUCIL/KONSYL) 58.6 % powder Take 2 teaspoonful by mouth daily (before supper)           Allergies   Allergies   Allergen  Reactions     Tetracycline Rash

## 2023-07-13 NOTE — PROGRESS NOTES
Care Management Discharge Note    Discharge Date: 07/13/2023       Discharge Disposition:  Home no needs  Discharge Services:  n/a  Discharge DME:  n/a  Discharge Transportation:  Home no needs  Private pay costs discussed: Not applicable    Education Provided on the Discharge Plan:  yes  Persons Notified of Discharge Plans: yes  Patient/Family in Agreement with the Plan: yes      Handoff Referral Completed: Yes    Additional Information:  No needs anticipated from CM at discharge per pt; independent at baseline. Pt to follow up with PCP post discharge if needs arise. Family to transport home.  7:35 AM    CAYETANO Saenz  7/13/2023

## 2023-07-13 NOTE — PROGRESS NOTES
Progress Note    Assessment/Plan  Patient doing very well at this point.  Patient to be educated with his wife and dressing changed we will remove all dressings and shower and repack wound.  I will see him in 1 week in the office.    Principal Problem:    Abdominal wall abscess  Active Problems:    Essential hypertension    Psoas muscle abscess (H)    Urinary retention      Subjective  Comfortable  Objective    Vital signs in last 24 hours  Temp:  [97.1  F (36.2  C)-98.2  F (36.8  C)] 97.9  F (36.6  C)  Pulse:  [51-68] 58  Resp:  [16-20] 20  BP: (118-172)/(58-81) 147/67  SpO2:  [97 %-99 %] 97 %  Weight:   [unfilled]    Intake/Output last 3 shifts  I/O last 3 completed shifts:  In: 1474 [P.O.:840; I.V.:634]  Out: 1650 [Urine:1650]  Intake/Output this shift:  I/O this shift:  In: 360 [P.O.:360]  Out: 100 [Urine:100]      Physical Exam  Abdomen benign.    Pertinent Labs   Lab Results   Component Value Date    WBC 11.3 (H) 07/11/2023    HGB 11.1 (L) 07/11/2023    HCT 35.1 (L) 07/11/2023    MCV 89 07/11/2023     07/11/2023             Pertinent Radiology     [unfilled]        Kody Murguia MD

## 2023-07-13 NOTE — PLAN OF CARE
Problem: Plan of Care - These are the overarching goals to be used throughout the patient stay.    Goal: Readiness for Transition of Care  Outcome: Adequate for Care Transition   Goal Outcome Evaluation:         Alert and oriented.   Denies pain, nausea and any other complain.     Plan to discharge today after dressing change instructions given to patient and wife.   Plan to discharge home with family ride.

## 2023-07-13 NOTE — PLAN OF CARE
Problem: Surgery Nonspecified  Goal: Absence of Bleeding  Outcome: Progressing  Goal: Effective Bowel Elimination  Outcome: Progressing  Goal: Fluid and Electrolyte Balance  Outcome: Progressing  Goal: Blood Glucose Level Within Targeted Range  Outcome: Progressing  Goal: Absence of Infection Signs and Symptoms  Outcome: Progressing  Goal: Anesthesia/Sedation Recovery  Intervention: Optimize Anesthesia Recovery  Recent Flowsheet Documentation  Taken 7/13/2023 0500 by Jessica Giles RN  Safety Promotion/Fall Prevention:   clutter free environment maintained   nonskid shoes/slippers when out of bed   room near nurse's station   safety round/check completed  Taken 7/13/2023 0015 by Jessica Giles RN  Safety Promotion/Fall Prevention:   clutter free environment maintained   nonskid shoes/slippers when out of bed   room near nurse's station   safety round/check completed  Goal: Optimal Pain Control and Function  Outcome: Progressing  Goal: Nausea and Vomiting Relief  Outcome: Progressing  Goal: Effective Urinary Elimination  Outcome: Progressing  Goal: Effective Oxygenation and Ventilation  Outcome: Progressing  Intervention: Optimize Oxygenation and Ventilation  Recent Flowsheet Documentation  Taken 7/13/2023 0500 by Jessica Giles RN  Head of Bed (HOB) Positioning: HOB at 30-45 degrees  Taken 7/13/2023 0015 by Jessica Giles RN  Head of Bed (HOB) Positioning: HOB at 30-45 degrees   Goal Outcome Evaluation:       Pt is alert and oriented x 4, c/o incisional pain, Tylenol given with relief. Lung sounds clear, on RA, SpO2 94 to 99 %. HR in the 50's, at rest, 70's with activity, Sinus Murray/ NSR. He is independent in the room,voiding well, had BM overnight x 2. Abdominal wound dressing is clean, dry, and intact.

## 2023-07-16 LAB — BACTERIA ASPIRATE CULT: NORMAL

## 2023-07-17 LAB — BACTERIA ABSC ANAEROBE+AEROBE CULT: NORMAL

## 2023-07-21 ENCOUNTER — OFFICE VISIT (OUTPATIENT)
Dept: FAMILY MEDICINE | Facility: CLINIC | Age: 73
End: 2023-07-21
Payer: MEDICARE

## 2023-07-21 VITALS
HEART RATE: 59 BPM | HEIGHT: 64 IN | OXYGEN SATURATION: 99 % | RESPIRATION RATE: 20 BRPM | BODY MASS INDEX: 25.93 KG/M2 | SYSTOLIC BLOOD PRESSURE: 124 MMHG | TEMPERATURE: 97.4 F | WEIGHT: 151.9 LBS | DIASTOLIC BLOOD PRESSURE: 80 MMHG

## 2023-07-21 DIAGNOSIS — K68.12 PSOAS MUSCLE ABSCESS (H): ICD-10-CM

## 2023-07-21 DIAGNOSIS — L02.211 ABDOMINAL WALL ABSCESS: ICD-10-CM

## 2023-07-21 DIAGNOSIS — K68.12 PSOAS MUSCLE ABSCESS (H): Primary | ICD-10-CM

## 2023-07-21 DIAGNOSIS — Z90.49 HISTORY OF CHOLECYSTECTOMY: ICD-10-CM

## 2023-07-21 DIAGNOSIS — I10 ESSENTIAL HYPERTENSION: ICD-10-CM

## 2023-07-21 DIAGNOSIS — R33.9 URINARY RETENTION: Primary | ICD-10-CM

## 2023-07-21 LAB
ANION GAP SERPL CALCULATED.3IONS-SCNC: 9 MMOL/L (ref 7–15)
BASOPHILS # BLD AUTO: 0 10E3/UL (ref 0–0.2)
BASOPHILS NFR BLD AUTO: 0 %
BUN SERPL-MCNC: 21.4 MG/DL (ref 8–23)
CALCIUM SERPL-MCNC: 10.9 MG/DL (ref 8.8–10.2)
CHLORIDE SERPL-SCNC: 104 MMOL/L (ref 98–107)
CREAT SERPL-MCNC: 1.18 MG/DL (ref 0.67–1.17)
DEPRECATED HCO3 PLAS-SCNC: 26 MMOL/L (ref 22–29)
EOSINOPHIL # BLD AUTO: 0.2 10E3/UL (ref 0–0.7)
EOSINOPHIL NFR BLD AUTO: 2 %
ERYTHROCYTE [DISTWIDTH] IN BLOOD BY AUTOMATED COUNT: 16.4 % (ref 10–15)
GFR SERPL CREATININE-BSD FRML MDRD: 64 ML/MIN/1.73M2
GLUCOSE SERPL-MCNC: 99 MG/DL (ref 70–99)
HCT VFR BLD AUTO: 37.1 % (ref 40–53)
HGB BLD-MCNC: 11.5 G/DL (ref 13.3–17.7)
IMM GRANULOCYTES # BLD: 0 10E3/UL
IMM GRANULOCYTES NFR BLD: 0 %
LYMPHOCYTES # BLD AUTO: 2.7 10E3/UL (ref 0.8–5.3)
LYMPHOCYTES NFR BLD AUTO: 24 %
MCH RBC QN AUTO: 28 PG (ref 26.5–33)
MCHC RBC AUTO-ENTMCNC: 31 G/DL (ref 31.5–36.5)
MCV RBC AUTO: 91 FL (ref 78–100)
MONOCYTES # BLD AUTO: 0.9 10E3/UL (ref 0–1.3)
MONOCYTES NFR BLD AUTO: 8 %
NEUTROPHILS # BLD AUTO: 7.6 10E3/UL (ref 1.6–8.3)
NEUTROPHILS NFR BLD AUTO: 67 %
PLATELET # BLD AUTO: 363 10E3/UL (ref 150–450)
POTASSIUM SERPL-SCNC: 5.5 MMOL/L (ref 3.4–5.3)
RBC # BLD AUTO: 4.1 10E6/UL (ref 4.4–5.9)
SODIUM SERPL-SCNC: 139 MMOL/L (ref 136–145)
WBC # BLD AUTO: 11.5 10E3/UL (ref 4–11)

## 2023-07-21 PROCEDURE — 36415 COLL VENOUS BLD VENIPUNCTURE: CPT | Performed by: INTERNAL MEDICINE

## 2023-07-21 PROCEDURE — 99214 OFFICE O/P EST MOD 30 MIN: CPT | Performed by: INTERNAL MEDICINE

## 2023-07-21 PROCEDURE — 85025 COMPLETE CBC W/AUTO DIFF WBC: CPT | Performed by: INTERNAL MEDICINE

## 2023-07-21 PROCEDURE — 80048 BASIC METABOLIC PNL TOTAL CA: CPT | Performed by: INTERNAL MEDICINE

## 2023-07-21 RX ORDER — TAMSULOSIN HYDROCHLORIDE 0.4 MG/1
0.4 CAPSULE ORAL
Qty: 90 CAPSULE | Refills: 3 | Status: SHIPPED | OUTPATIENT
Start: 2023-07-21 | End: 2024-01-03 | Stop reason: SINTOL

## 2023-07-21 RX ORDER — ATENOLOL 50 MG/1
50 TABLET ORAL DAILY
Qty: 90 TABLET | Refills: 3 | Status: SHIPPED | OUTPATIENT
Start: 2023-07-21 | End: 2024-01-03

## 2023-07-21 ASSESSMENT — PAIN SCALES - GENERAL: PAINLEVEL: NO PAIN (0)

## 2023-07-21 NOTE — RESULT ENCOUNTER NOTE
The white blood cell count remains very mildly elevated.  Potassium and kidney function are also slightly elevated.  These can all be residual effects of infection and antibiotic.  Recommend to recheck in 1 week, orders placed

## 2023-07-21 NOTE — PROGRESS NOTES
"  Assessment & Plan     Urinary retention - helpful, refill sent;  watch for side effects of dizziness  - tamsulosin (FLOMAX) 0.4 MG capsule; Take 1 capsule (0.4 mg) by mouth daily (with dinner)    Abdominal wall abscess - check labs.  Gave patient my card for surgeon to fax notes at their next visit;  surgeon directing wound care and follow-up   - CBC with platelets and differential; Future  - Basic metabolic panel  (Ca, Cl, CO2, Creat, Gluc, K, Na, BUN); Future    History of cholecystectomy - leading to #2    Psoas muscle abscess (H) -due to #3    Essential hypertension - with bradycardia and rare hypotension;  decrease from 100 to 50   - atenolol (TENORMIN) 50 MG tablet; Take 1 tablet (50 mg) by mouth daily           MED REC REQUIRED  Post Medication Reconciliation Status: discharge medications reconciled and changed, per note/orders  BMI:   Estimated body mass index is 26.07 kg/m  as calculated from the following:    Height as of this encounter: 1.626 m (5' 4\").    Weight as of this encounter: 68.9 kg (151 lb 14.4 oz).       Patient Instructions   Hypertension  Decrease atenolol from 100 to 50 mg due to low heart rate    Bladder/prostate  Ok to continue Tamsulosin/Flomax  Watch for dizziness    Abscess  Check blood work today    Amy Chandler DO  [facility] [place]    Trent Guerrero is a 73 year old, presenting for the following health issues:  Hospital F/U        7/21/2023     6:56 AM   Additional Questions   Roomed by zaida nava   Accompanied by Cyndy Reid (Spouse)          7/21/2023     6:56 AM   Patient Reported Additional Medications   Patient reports taking the following new medications none     HPI       Hospital Follow-up Visit:    Hospital/Nursing Home/IP Rehab Facility: [facility]  Date of Admission: 7/8/23  Date of Discharge: 7/13/23  Reason(s) for Admission: Abdominal wall abscess     Was your hospitalization related to COVID-19? No   Problems " taking medications regularly:  None  Medication changes since discharge: Discharge Medications       Current Discharge Medication List            START taking these medications     Details   acetaminophen (TYLENOL) 650 MG suppository Place 1 suppository (650 mg) rectally every 6 hours as needed for mild pain or other (and adjunct with moderate or severe pain or per patient request)     Associated Diagnoses: Abdominal wall abscess       cefdinir (OMNICEF) 300 MG capsule Take 1 capsule (300 mg) by mouth every 12 hours for 13 doses  Qty: 13 capsule, Refills: 0     Associated Diagnoses: Abdominal wall abscess       HYDROmorphone (DILAUDID) 2 MG tablet Take 1 tablet (2 mg) by mouth every 4 hours as needed for severe pain  Qty: 12 tablet, Refills: 0     Associated Diagnoses: Abdominal wall abscess       polyethylene glycol (MIRALAX) 17 GM/Dose powder Take 17 g by mouth daily for 10 days  Qty: 510 g, Refills: 0     Associated Diagnoses: Abdominal wall abscess       senna-docusate (SENOKOT-S/PERICOLACE) 8.6-50 MG tablet Take 1 tablet by mouth 2 times daily for 10 days  Qty: 20 tablet, Refills: 0     Associated Diagnoses: Abdominal wall abscess       tamsulosin (FLOMAX) 0.4 MG capsule Take 1 capsule (0.4 mg) by mouth daily (with dinner) for 30 days  Qty: 30 capsule, Refills: 0     Associated Diagnoses: Urinary retention     Problems adhering to non-medication therapy:  None    Summary of hospitalization:  [facility] discharge summary reviewed  Diagnostic Tests/Treatments reviewed.  Follow up needed: Surgery  Other Healthcare Providers Involved in Patient s Care:         None  Update since discharge: improved.         Plan of care communicated with patient and family                   Abdominal Wall Abscess  Psoas Abscess  History of cholecystitis  -- He has had a complicated course over the last year.  He had acute cholecystitis 6/9/2022 that was a complicated surgery.  Ultimately during the cholecystectomy,  gallstones spilled out into the peritoneal cavity.  He developed postoperative pancreatitis 7/2022.  He had a retained stone that went on to develop a psoas abscess and then an abdominal wall abscess.  He had multiple courses of antibiotics for the psoas abscess.  The psoas abscess was drained as an outpatient with IR on 6/12/2023.  Unfortunately this did not eradicate the infection and he developed an abdominal wall abscess requiring hospital admission earlier this month-anterior abdominal wall right side abscess pocket with retained biliary stone, potentially fistula, s/p retained foreign body.  I&D on 7/10.  Open wound with packing.   --The psoas abscess grew E. Coli  -- He was treated with IV Zosyn and transition to cefdinir.  Infectious disease was consulted  -- He is following with general surgery for wound care - has wound vac in the hospital, family and patient to manage at home now with wound packing once daily (down from BID)  --he had follow-up with the Surgeon 2 days ago (not in [facility]) - [phone] Dr. Murguia  --next follow-up July 31  --is not getting home care - wife is doing wound care  --antibiotic course was complete 7/20  --he reports daily improvement  --only needing Tylenol for pain;  never took dilaudid because didn't need  --was told he is getting a hernia over the incision    Urine Retention:  -- He required straight cath during the hospital stay with 500 mL return  --He was started on Flomax 7/11 with resolution of urine retention  -- PSA was normal  --historically has 2-3 nocturia - for many years  --since hospital diagnosis he only gest up 1 x night    Hypertension  --blood pressure was low once 90s/50s at home;  --pulse is sometimes low in 40s-50s    Current Outpatient Medications   Medication Sig Dispense Refill    acetaminophen (TYLENOL) 500 MG tablet Take 1,000 mg by mouth 3 times daily      acetaminophen (TYLENOL) 650 MG suppository Place 1 suppository (650 mg) rectally every 6 hours  "as needed for mild pain or other (and adjunct with moderate or severe pain or per patient request)      albuterol (PROAIR HFA/PROVENTIL HFA/VENTOLIN HFA) 108 (90 Base) MCG/ACT inhaler Inhale 2 puffs into the lungs every 4 hours as needed for shortness of breath or wheezing 18 g 11    atenolol (TENORMIN) 100 MG tablet Take 1 tablet (100 mg) by mouth daily 90 tablet 3    famotidine (PEPCID) 40 MG tablet Take 1 tablet (40 mg) by mouth daily 90 tablet 3    losartan (COZAAR) 50 MG tablet Take 1 tablet (50 mg) by mouth daily 90 tablet 3    MULTIVITAMINS OR TABS Take 1 tablet by mouth daily 100 3    Probiotic Product (PRO-BIOTIC BLEND) CAPS Take 1 capsule by mouth daily      psyllium (METAMUCIL/KONSYL) 58.6 % powder Take 2 teaspoonful by mouth daily (before supper)      tamsulosin (FLOMAX) 0.4 MG capsule Take 1 capsule (0.4 mg) by mouth daily (with dinner) for 30 days 30 capsule 0           Review of Systems   Constitutional, HEENT, cardiovascular, pulmonary, GI, , musculoskeletal, neuro, skin, endocrine and psych systems are negative, except as otherwise noted.      Objective    /80 (BP Location: Right arm, Patient Position: Sitting, Cuff Size: Adult Regular)   Pulse 59   Temp 97.4  F (36.3  C) (Tympanic)   Resp 20   Ht 1.626 m (5' 4\")   Wt 68.9 kg (151 lb 14.4 oz)   SpO2 99%   BMI 26.07 kg/m    Body mass index is 26.07 kg/m .  Physical Exam   GENERAL APPEARANCE: healthy, alert, and no distress  RESP: lungs clear to auscultation - no rales, rhonchi or wheezes  CV: normal S1 S2, no S3 or S4, no murmur, click or rub, and bradycardia  ABDOMEN: soft, nontender, without hepatosplenomegaly or masses and bowel sounds normal  Skin - RLQ abdominal wound with dressing in place.  No surrounding erythema                    "

## 2023-07-21 NOTE — PATIENT INSTRUCTIONS
Hypertension  Decrease atenolol from 100 to 50 mg due to low heart rate    Bladder/prostate  Ok to continue Tamsulosin/Flomax  Watch for dizziness    Abscess  Check blood work today

## 2023-07-28 ENCOUNTER — LAB (OUTPATIENT)
Dept: LAB | Facility: CLINIC | Age: 73
End: 2023-07-28
Payer: MEDICARE

## 2023-07-28 DIAGNOSIS — K68.12 PSOAS MUSCLE ABSCESS (H): ICD-10-CM

## 2023-07-28 DIAGNOSIS — Z11.59 NEED FOR HEPATITIS C SCREENING TEST: ICD-10-CM

## 2023-07-28 LAB
ANION GAP SERPL CALCULATED.3IONS-SCNC: 10 MMOL/L (ref 7–15)
BASOPHILS # BLD AUTO: 0 10E3/UL (ref 0–0.2)
BASOPHILS NFR BLD AUTO: 0 %
BUN SERPL-MCNC: 18.1 MG/DL (ref 8–23)
CALCIUM SERPL-MCNC: 9.9 MG/DL (ref 8.8–10.2)
CHLORIDE SERPL-SCNC: 103 MMOL/L (ref 98–107)
CREAT SERPL-MCNC: 1.07 MG/DL (ref 0.67–1.17)
DEPRECATED HCO3 PLAS-SCNC: 26 MMOL/L (ref 22–29)
EOSINOPHIL # BLD AUTO: 0.2 10E3/UL (ref 0–0.7)
EOSINOPHIL NFR BLD AUTO: 2 %
ERYTHROCYTE [DISTWIDTH] IN BLOOD BY AUTOMATED COUNT: 16.3 % (ref 10–15)
GFR SERPL CREATININE-BSD FRML MDRD: 72 ML/MIN/1.73M2
GLUCOSE SERPL-MCNC: 119 MG/DL (ref 70–99)
HCT VFR BLD AUTO: 35.6 % (ref 40–53)
HCV AB SERPL QL IA: NONREACTIVE
HGB BLD-MCNC: 11 G/DL (ref 13.3–17.7)
IMM GRANULOCYTES # BLD: 0 10E3/UL
IMM GRANULOCYTES NFR BLD: 0 %
LYMPHOCYTES # BLD AUTO: 3 10E3/UL (ref 0.8–5.3)
LYMPHOCYTES NFR BLD AUTO: 27 %
MCH RBC QN AUTO: 28.1 PG (ref 26.5–33)
MCHC RBC AUTO-ENTMCNC: 30.9 G/DL (ref 31.5–36.5)
MCV RBC AUTO: 91 FL (ref 78–100)
MONOCYTES # BLD AUTO: 0.8 10E3/UL (ref 0–1.3)
MONOCYTES NFR BLD AUTO: 7 %
NEUTROPHILS # BLD AUTO: 7 10E3/UL (ref 1.6–8.3)
NEUTROPHILS NFR BLD AUTO: 64 %
PLATELET # BLD AUTO: 327 10E3/UL (ref 150–450)
POTASSIUM SERPL-SCNC: 4.3 MMOL/L (ref 3.4–5.3)
RBC # BLD AUTO: 3.91 10E6/UL (ref 4.4–5.9)
SODIUM SERPL-SCNC: 139 MMOL/L (ref 136–145)
WBC # BLD AUTO: 11 10E3/UL (ref 4–11)

## 2023-07-28 PROCEDURE — 36415 COLL VENOUS BLD VENIPUNCTURE: CPT | Performed by: INTERNAL MEDICINE

## 2023-07-28 PROCEDURE — 80048 BASIC METABOLIC PNL TOTAL CA: CPT | Performed by: INTERNAL MEDICINE

## 2023-07-28 PROCEDURE — 86803 HEPATITIS C AB TEST: CPT | Performed by: INTERNAL MEDICINE

## 2023-07-28 PROCEDURE — 85025 COMPLETE CBC W/AUTO DIFF WBC: CPT | Performed by: INTERNAL MEDICINE

## 2023-07-28 NOTE — RESULT ENCOUNTER NOTE
Kidney function, electrolytes are normal.  Nonfasting blood sugar is in the normal range.  Blood cell count is improving.  There is persisting mild anemia, this will take several weeks or more to normalize

## 2023-08-06 LAB — BACTERIA ABSC ANAEROBE+AEROBE CULT: NO GROWTH

## 2023-08-28 ENCOUNTER — TRANSFERRED RECORDS (OUTPATIENT)
Dept: HEALTH INFORMATION MANAGEMENT | Facility: CLINIC | Age: 73
End: 2023-08-28
Payer: MEDICARE

## 2023-11-30 ENCOUNTER — OFFICE VISIT (OUTPATIENT)
Dept: FAMILY MEDICINE | Facility: CLINIC | Age: 73
End: 2023-11-30
Payer: MEDICARE

## 2023-11-30 VITALS
SYSTOLIC BLOOD PRESSURE: 128 MMHG | TEMPERATURE: 97 F | DIASTOLIC BLOOD PRESSURE: 84 MMHG | HEIGHT: 64 IN | RESPIRATION RATE: 14 BRPM | BODY MASS INDEX: 25.78 KG/M2 | WEIGHT: 151 LBS | OXYGEN SATURATION: 96 % | HEART RATE: 66 BPM

## 2023-11-30 DIAGNOSIS — J20.9 ACUTE BRONCHITIS, UNSPECIFIED ORGANISM: Primary | ICD-10-CM

## 2023-11-30 DIAGNOSIS — K43.2 INCISIONAL HERNIA, WITHOUT OBSTRUCTION OR GANGRENE: ICD-10-CM

## 2023-11-30 PROCEDURE — 99213 OFFICE O/P EST LOW 20 MIN: CPT | Performed by: INTERNAL MEDICINE

## 2023-11-30 RX ORDER — AZITHROMYCIN 250 MG/1
TABLET, FILM COATED ORAL
Qty: 6 TABLET | Refills: 0 | Status: CANCELLED | OUTPATIENT
Start: 2023-11-30 | End: 2023-12-05

## 2023-11-30 RX ORDER — AZITHROMYCIN 250 MG/1
TABLET, FILM COATED ORAL
Qty: 6 TABLET | Refills: 0 | Status: SHIPPED | OUTPATIENT
Start: 2023-11-30 | End: 2023-12-05

## 2023-11-30 RX ORDER — PREDNISONE 20 MG/1
40 TABLET ORAL DAILY
Qty: 10 TABLET | Refills: 0 | Status: SHIPPED | OUTPATIENT
Start: 2023-11-30 | End: 2023-12-05

## 2023-11-30 ASSESSMENT — ENCOUNTER SYMPTOMS: COUGH: 1

## 2023-11-30 ASSESSMENT — PAIN SCALES - GENERAL: PAINLEVEL: NO PAIN (0)

## 2023-11-30 NOTE — PATIENT INSTRUCTIONS
Hernia  Recommend to wear an abdominal binder  Hernia may improve or it may not; recommend follow-up with your surgeon if it does not help  Keep splinting while coughing    Cough  Probable viral induced bronchospasm  May be component of bacterial infection as well due to duration of symptoms  Start Prednisone 40 mg daily x 5 days - take in AM  Start Zpak x 5 days  No need for viral swabs or Chest xray at this time

## 2023-11-30 NOTE — PROGRESS NOTES
Assessment & Plan     Acute bronchitis, unspecified organism  Given duration of symptoms and severe cough associated with bronchospasm, prednisone can be the main treatment of choice.  Normally I would have waited/deferred antibiotics, but given the severity of symptoms and the worsening of the hernia, it is reasonable to treat more aggressively with antibiotics at this point.  Defer swab testing and chest x-ray since his lungs are clear.  - predniSONE (DELTASONE) 20 MG tablet; Take 2 tablets (40 mg) by mouth daily for 5 days  - azithromycin (ZITHROMAX) 250 MG tablet; Take 2 tablets (500 mg) by mouth daily for 1 day, THEN 1 tablet (250 mg) daily for 4 days.    Incisional hernia, without obstruction or gangrene  Discussed splinting and wearing an abdominal binder.  Advised follow-up with the surgeon once his illness has resolved      Patient Instructions   Hernia  Recommend to wear an abdominal binder  Hernia may improve or it may not; recommend follow-up with your surgeon if it does not help  Keep splinting while coughing    Cough  Probable viral induced bronchospasm  May be component of bacterial infection as well due to duration of symptoms  Start Prednisone 40 mg daily x 5 days - take in AM  Start Zpak x 5 days  No need for viral swabs or Chest xray at this time    Amy Chandler, [facility]    Trent Guerrero is a 73 year old, presenting for the following health issues:  Cough (About 10 days symptoms started (sore throat, congestion, coughing up clear phlegm, drainage in back of throat, fatigued) cough started about 17 days ago. Pt would like hernia looked at on right side, did surgery in July and feels like ever since the cough started that the hernia is getting bigger and worse with cough. )        11/30/2023    11:08 AM   Additional Questions   Roomed by Jolene GUERRERO   Accompanied by Shireen, wife          11/30/2023    11:08 AM   Patient Reported Additional Medications   Patient  reports taking the following new medications no new meds     Been using albuterol inhaler and cirucudin.    History of Present Illness       Reason for visit:  Bad cough-check hernia  Symptom onset:  1-2 weeks ago  Symptoms include:  Cough-tiredness.  (Cough makes hernia hurt)  Symptom intensity:  Moderate  Symptom progression:  Worsening  Had these symptoms before:  Yes  Has tried/received treatment for these symptoms:  Yes  Previous treatment was successful:  Yes  Prior treatment description:  I have brochitis on my records  What makes it worse:  Laying down    He eats 2-3 servings of fruits and vegetables daily.He consumes 1 sweetened beverage(s) daily.He exercises with enough effort to increase his heart rate 10 to 19 minutes per day.  He exercises with enough effort to increase his heart rate 3 or less days per week.   He is taking medications regularly.     Cough  --symptoms started with cough;  a few days later developed sore throat and runny nose; this has since resolved  --now having worsening cough, especially with laying flat, wheezing at night  --not sleeping so, so is very fatigued  --tested negative for COVID at home x 2 - tested about 5 days into symptoms, then again 7-10 days into symptoms   --symptoms are somewhat improved today  --symptoms started about 2 weeks ago; no fevers      Current Outpatient Medications   Medication Sig Dispense Refill    acetaminophen (TYLENOL) 500 MG tablet Take 1,000 mg by mouth 3 times daily      albuterol (PROAIR HFA/PROVENTIL HFA/VENTOLIN HFA) 108 (90 Base) MCG/ACT inhaler Inhale 2 puffs into the lungs every 4 hours as needed for shortness of breath or wheezing 18 g 11    atenolol (TENORMIN) 50 MG tablet Take 1 tablet (50 mg) by mouth daily 90 tablet 3    famotidine (PEPCID) 40 MG tablet Take 1 tablet (40 mg) by mouth daily 90 tablet 3    losartan (COZAAR) 50 MG tablet Take 1 tablet (50 mg) by mouth daily 90 tablet 3    MULTIVITAMINS OR TABS Take 1 tablet by mouth  "daily 100 3    Probiotic Product (PRO-BIOTIC BLEND) CAPS Take 1 capsule by mouth daily      psyllium (METAMUCIL/KONSYL) 58.6 % powder Take 2 teaspoonful by mouth daily (before supper)      tamsulosin (FLOMAX) 0.4 MG capsule Take 1 capsule (0.4 mg) by mouth daily (with dinner) 90 capsule 3    acetaminophen (TYLENOL) 650 MG suppository Place 1 suppository (650 mg) rectally every 6 hours as needed for mild pain or other (and adjunct with moderate or severe pain or per patient request)             Review of Systems   Respiratory:  Positive for cough.       Constitutional, HEENT, cardiovascular, pulmonary, gi and gu systems are negative, except as otherwise noted.      Objective    /84   Pulse 66   Temp 97  F (36.1  C) (Tympanic)   Resp 14   Ht 1.626 m (5' 4\")   Wt 68.5 kg (151 lb)   SpO2 96%   BMI 25.92 kg/m    Body mass index is 25.92 kg/m .  Physical Exam   GENERAL APPEARANCE: alert, no distress, and fatigued  EYES: Eyes grossly normal to inspection, PERRL, and conjunctivae and sclerae normal  HENT: ear canals and TM's normal and nose and mouth without ulcers or lesions  NECK: no adenopathy, no asymmetry, masses, or scars, and thyroid normal to palpation  RESP: lungs clear to auscultation - no rales, rhonchi or wheezes  CV: regular rates and rhythm, normal S1 S2, no S3 or S4, and no murmur, click or rub                      "

## 2023-12-27 ASSESSMENT — ENCOUNTER SYMPTOMS
HEARTBURN: 1
CHILLS: 0
DIARRHEA: 0
HEMATURIA: 0
SORE THROAT: 0
MYALGIAS: 0
PARESTHESIAS: 0
JOINT SWELLING: 0
HEADACHES: 0
PALPITATIONS: 0
FEVER: 0
WEAKNESS: 0
CONSTIPATION: 0
DYSURIA: 0
HEMATOCHEZIA: 0
SHORTNESS OF BREATH: 0
FREQUENCY: 0
ARTHRALGIAS: 0
NAUSEA: 0
EYE PAIN: 0
NERVOUS/ANXIOUS: 1
COUGH: 1
DIZZINESS: 1
ABDOMINAL PAIN: 1

## 2023-12-27 ASSESSMENT — ACTIVITIES OF DAILY LIVING (ADL): CURRENT_FUNCTION: NO ASSISTANCE NEEDED

## 2024-01-03 ENCOUNTER — OFFICE VISIT (OUTPATIENT)
Dept: FAMILY MEDICINE | Facility: CLINIC | Age: 74
End: 2024-01-03
Payer: MEDICARE

## 2024-01-03 VITALS
TEMPERATURE: 96.2 F | SYSTOLIC BLOOD PRESSURE: 114 MMHG | HEIGHT: 65 IN | RESPIRATION RATE: 12 BRPM | HEART RATE: 57 BPM | WEIGHT: 154.2 LBS | BODY MASS INDEX: 25.69 KG/M2 | OXYGEN SATURATION: 97 % | DIASTOLIC BLOOD PRESSURE: 80 MMHG

## 2024-01-03 DIAGNOSIS — K21.9 GASTROESOPHAGEAL REFLUX DISEASE WITHOUT ESOPHAGITIS: ICD-10-CM

## 2024-01-03 DIAGNOSIS — H61.22 IMPACTED CERUMEN OF LEFT EAR: ICD-10-CM

## 2024-01-03 DIAGNOSIS — Z13.6 CARDIOVASCULAR SCREENING; LDL GOAL LESS THAN 130: ICD-10-CM

## 2024-01-03 DIAGNOSIS — R73.03 PREDIABETES: ICD-10-CM

## 2024-01-03 DIAGNOSIS — J30.2 SEASONAL ALLERGIC RHINITIS, UNSPECIFIED TRIGGER: ICD-10-CM

## 2024-01-03 DIAGNOSIS — I10 ESSENTIAL HYPERTENSION: ICD-10-CM

## 2024-01-03 DIAGNOSIS — Z00.00 ENCOUNTER FOR MEDICARE ANNUAL WELLNESS EXAM: Primary | ICD-10-CM

## 2024-01-03 PROBLEM — K55.9 ISCHEMIC COLITIS (H): Status: RESOLVED | Noted: 2022-12-30 | Resolved: 2024-01-03

## 2024-01-03 LAB
ANION GAP SERPL CALCULATED.3IONS-SCNC: 6 MMOL/L (ref 7–15)
BUN SERPL-MCNC: 17.8 MG/DL (ref 8–23)
CALCIUM SERPL-MCNC: 10 MG/DL (ref 8.8–10.2)
CHLORIDE SERPL-SCNC: 105 MMOL/L (ref 98–107)
CHOLEST SERPL-MCNC: 164 MG/DL
CREAT SERPL-MCNC: 1.17 MG/DL (ref 0.67–1.17)
DEPRECATED HCO3 PLAS-SCNC: 28 MMOL/L (ref 22–29)
EGFRCR SERPLBLD CKD-EPI 2021: 65 ML/MIN/1.73M2
ERYTHROCYTE [DISTWIDTH] IN BLOOD BY AUTOMATED COUNT: 14.3 % (ref 10–15)
FASTING STATUS PATIENT QL REPORTED: YES
GLUCOSE SERPL-MCNC: 98 MG/DL (ref 70–99)
HBA1C MFR BLD: 5.7 % (ref 0–5.6)
HCT VFR BLD AUTO: 41.5 % (ref 40–53)
HDLC SERPL-MCNC: 40 MG/DL
HGB BLD-MCNC: 13.2 G/DL (ref 13.3–17.7)
LDLC SERPL CALC-MCNC: 97 MG/DL
MCH RBC QN AUTO: 29.6 PG (ref 26.5–33)
MCHC RBC AUTO-ENTMCNC: 31.8 G/DL (ref 31.5–36.5)
MCV RBC AUTO: 93 FL (ref 78–100)
NONHDLC SERPL-MCNC: 124 MG/DL
PLATELET # BLD AUTO: 312 10E3/UL (ref 150–450)
POTASSIUM SERPL-SCNC: 4.7 MMOL/L (ref 3.4–5.3)
RBC # BLD AUTO: 4.46 10E6/UL (ref 4.4–5.9)
SODIUM SERPL-SCNC: 139 MMOL/L (ref 135–145)
TRIGL SERPL-MCNC: 135 MG/DL
WBC # BLD AUTO: 9.2 10E3/UL (ref 4–11)

## 2024-01-03 PROCEDURE — 91320 SARSCV2 VAC 30MCG TRS-SUC IM: CPT | Performed by: INTERNAL MEDICINE

## 2024-01-03 PROCEDURE — 85027 COMPLETE CBC AUTOMATED: CPT | Performed by: INTERNAL MEDICINE

## 2024-01-03 PROCEDURE — 80048 BASIC METABOLIC PNL TOTAL CA: CPT | Performed by: INTERNAL MEDICINE

## 2024-01-03 PROCEDURE — G0439 PPPS, SUBSEQ VISIT: HCPCS | Performed by: INTERNAL MEDICINE

## 2024-01-03 PROCEDURE — 69209 REMOVE IMPACTED EAR WAX UNI: CPT | Mod: LT | Performed by: INTERNAL MEDICINE

## 2024-01-03 PROCEDURE — 90480 ADMN SARSCOV2 VAC 1/ONLY CMP: CPT | Performed by: INTERNAL MEDICINE

## 2024-01-03 PROCEDURE — 99214 OFFICE O/P EST MOD 30 MIN: CPT | Mod: 25 | Performed by: INTERNAL MEDICINE

## 2024-01-03 PROCEDURE — 83036 HEMOGLOBIN GLYCOSYLATED A1C: CPT | Performed by: INTERNAL MEDICINE

## 2024-01-03 PROCEDURE — 80061 LIPID PANEL: CPT | Performed by: INTERNAL MEDICINE

## 2024-01-03 PROCEDURE — 36415 COLL VENOUS BLD VENIPUNCTURE: CPT | Performed by: INTERNAL MEDICINE

## 2024-01-03 RX ORDER — LOSARTAN POTASSIUM 50 MG/1
50 TABLET ORAL DAILY
Qty: 90 TABLET | Refills: 3 | Status: SHIPPED | OUTPATIENT
Start: 2024-01-03

## 2024-01-03 RX ORDER — ALBUTEROL SULFATE 90 UG/1
2 AEROSOL, METERED RESPIRATORY (INHALATION) EVERY 4 HOURS PRN
Qty: 18 G | Refills: 11 | Status: SHIPPED | OUTPATIENT
Start: 2024-01-03

## 2024-01-03 RX ORDER — ATENOLOL 25 MG/1
25 TABLET ORAL DAILY
Qty: 90 TABLET | Refills: 3 | Status: SHIPPED | OUTPATIENT
Start: 2024-01-03

## 2024-01-03 RX ORDER — FAMOTIDINE 40 MG/1
40 TABLET, FILM COATED ORAL DAILY
Qty: 90 TABLET | Refills: 3 | Status: SHIPPED | OUTPATIENT
Start: 2024-01-03

## 2024-01-03 ASSESSMENT — ENCOUNTER SYMPTOMS
WEAKNESS: 0
FREQUENCY: 0
HEMATOCHEZIA: 0
COUGH: 1
JOINT SWELLING: 0
HEMATURIA: 0
CHILLS: 0
PALPITATIONS: 0
HEADACHES: 0
NAUSEA: 0
SHORTNESS OF BREATH: 0
HEARTBURN: 1
ARTHRALGIAS: 0
MYALGIAS: 0
DYSURIA: 0
DIZZINESS: 1
FEVER: 0
CONSTIPATION: 0
PARESTHESIAS: 0
SORE THROAT: 0
EYE PAIN: 0
NERVOUS/ANXIOUS: 1
DIARRHEA: 0
ABDOMINAL PAIN: 1

## 2024-01-03 ASSESSMENT — ACTIVITIES OF DAILY LIVING (ADL): CURRENT_FUNCTION: NO ASSISTANCE NEEDED

## 2024-01-03 ASSESSMENT — PAIN SCALES - GENERAL: PAINLEVEL: NO PAIN (0)

## 2024-01-03 NOTE — PATIENT INSTRUCTIONS
Dizziness  Prostate  Stop the Tamsulosin (flomax) due to dizziness    Hypertension  Low pulse  Decrease atenolol from 50 to 25 mg due to low pulse  Goal BP is < 140  If home blood pressure is running high, let me know  If pulse continues to be below 55 frequently, let me know, we would stop the atenolol entirely  Blood work today    Health Care Maintenance  Recommend RSV vaccine      Patient Education   Personalized Prevention Plan  You are due for the preventive services outlined below.  Your care team is available to assist you in scheduling these services.  If you have already completed any of these items, please share that information with your care team to update in your medical record.  There are no preventive care reminders to display for this patient.    Your Health Risk Assessment indicates you feel you are not in good health    A healthy lifestyle helps keep the body fit and the mind alert. It helps protect you from disease, helps you fight disease, and helps prevent chronic disease (disease that doesn't go away) from getting worse. This is important as you get older and begin to notice twinges in muscles and joints and a decline in the strength and stamina you once took for granted. A healthy lifestyle includes good healthcare, good nutrition, weight control, recreation, and regular exercise. Avoid harmful substances and do what you can to keep safe. Another part of a healthy lifestyle is stay mentally active and socially involved.    Good healthcare   Have a wellness visit every year.   If you have new symptoms, let us know right away. Don't wait until the next checkup.   Take medicines exactly as prescribed and keep your medicines in a safe place. Tell us if your medicine causes problems.   Healthy diet and weight control   Eat 3 or 4 small, nutritious, low-fat, high-fiber meals a day. Include a variety of fruits, vegetables, and whole-grain foods.   Make sure you get enough calcium in your diet.  Calcium, vitamin D, and exercise help prevent osteoporosis (bone thinning).   If you live alone, try eating with others when you can. That way you get a good meal and have company while you eat it.   Try to keep a healthy weight. If you eat more calories than your body uses for energy, it will be stored as fat and you will gain weight.     Recreation   Recreation is not limited to sports and team events. It includes any activity that provides relaxation, interest, enjoyment, and exercise. Recreation provides an outlet for physical, mental, and social energy. It can give a sense of worth and achievement. It can help you stay healthy.    Mental Exercise and Social Involvement  Mental and emotional health is as important as physical health. Keep in touch with friends and family. Stay as active as possible. Continue to learn and challenge yourself.   Things you can do to stay mentally active are:  Learn something new, like a foreign language or musical instrument.   Play SCRABBLE or do crossword puzzles. If you cannot find people to play these games with you at home, you can play them with others on your computer through the Internet.   Join a games club--anything from card games to chess or checkers or lawn bowling.   Start a new hobby.   Go back to school.   Volunteer.   Read.   Keep up with world events.  Hearing Loss: Care Instructions  Overview     Hearing loss is a sudden or slow decrease in how well you hear. It can range from slight to profound. Permanent hearing loss can occur with aging. It also can happen when you are exposed long-term to loud noise. Examples include listening to loud music, riding motorcycles, or being around other loud machines.  Hearing loss can affect your work and home life. It can make you feel lonely or depressed. You may feel that you have lost your independence. But hearing aids and other devices can help you hear better and feel connected to others.  Follow-up care is a key part of  your treatment and safety. Be sure to make and go to all appointments, and call your doctor if you are having problems. It's also a good idea to know your test results and keep a list of the medicines you take.  How can you care for yourself at home?  Avoid loud noises whenever possible. This helps keep your hearing from getting worse.  Always wear hearing protection around loud noises.  Wear a hearing aid as directed.  A professional can help you pick a hearing aid that will work best for you.  You can also get hearing aids over the counter for mild to moderate hearing loss.  Have hearing tests as your doctor suggests. They can show whether your hearing has changed. Your hearing aid may need to be adjusted.  Use other devices as needed. These may include:  Telephone amplifiers and hearing aids that can connect to a television, stereo, radio, or microphone.  Devices that use lights or vibrations. These alert you to the doorbell, a ringing telephone, or a baby monitor.  Television closed-captioning. This shows the words at the bottom of the screen. Most new TVs can do this.  TTY (text telephone). This lets you type messages back and forth on the telephone instead of talking or listening. These devices are also called TDD. When messages are typed on the keyboard, they are sent over the phone line to a receiving TTY. The message is shown on a monitor.  Use text messaging, social media, and email if it is hard for you to communicate by telephone.  Try to learn a listening technique called speechreading. It is not lipreading. You pay attention to people's gestures, expressions, posture, and tone of voice. These clues can help you understand what a person is saying. Face the person you are talking to, and have them face you. Make sure the lighting is good. You need to see the other person's face clearly.  Think about counseling if you need help to adjust to your hearing loss.  When should you call for help?  Watch closely  "for changes in your health, and be sure to contact your doctor if:    You think your hearing is getting worse.     You have new symptoms, such as dizziness or nausea.   Where can you learn more?  Go to https://www.[employer].net/patiented  Enter R798 in the search box to learn more about \"Hearing Loss: Care Instructions.\"  Current as of: February 28, 2023               Content Version: 13.8    6176-9811 [employer].   Care instructions adapted under license by your healthcare professional. If you have questions about a medical condition or this instruction, always ask your healthcare professional. [employer] disclaims any warranty or liability for your use of this information.         "

## 2024-01-03 NOTE — PROGRESS NOTES
"SUBJECTIVE:   Cesar is a 73 year old, presenting for the following:  AWV (tamsulosin (FLOMAX) 0.4 MG capsule is getting dizzy for this since been on this for the last 6 months , not wanting to have sex, would like to have to still be still on this ), Hypertension, and Health Maintenance (Due for RSV has to get at pharmacy , covid )        1/3/2024     7:11 AM   Additional Questions   Roomed by zaida nava   Accompanied by self         1/3/2024     7:11 AM   Patient Reported Additional Medications   Patient reports taking the following new medications none       Are you in the first 12 months of your Medicare coverage?  No    Healthy Habits:     In general, how would you rate your overall health?  Fair    Frequency of exercise:  2-3 days/week    Duration of exercise:  30-45 minutes    Do you usually eat at least 4 servings of fruit and vegetables a day, include whole grains    & fiber and avoid regularly eating high fat or \"junk\" foods?  Yes    Taking medications regularly:  Yes    Medication side effects:  Lightheadedness    Ability to successfully perform activities of daily living:  No assistance needed    Home Safety:  No safety concerns identified    Hearing Impairment:  Difficulty following a conversation in a noisy restaurant or crowded room, feel that people are mumbling or not speaking clearly, need to ask people to speak up or repeat themselves, find that men's voices are easier to understand than woman's, difficulty understanding soft or whispered speech and difficulty understanding speech on the telephone    In the past 6 months, have you been bothered by leaking of urine?  No    In general, how would you rate your overall mental or emotional health?  Good    Additional concerns today:  No      Chief Complaint   Patient presents with    AWV     tamsulosin (FLOMAX) 0.4 MG capsule is getting dizzy for this since been on this for the last 6 months , not wanting to have sex, would like to have to still be still " on this     Hypertension    Health Maintenance     Due for RSV has to get at pharmacy , covid          Today's PHQ-2 Score:       2024    11:33 AM   PHQ-2 (  [employer])   Q1: Little interest or pleasure in doing things 0   Q2: Feeling down, depressed or hopeless 0   PHQ-2 Score 0   Q1: Little interest or pleasure in doing things Not at all   Q2: Feeling down, depressed or hopeless Not at all   PHQ-2 Score 0       Have you ever done Advance Care Planning? (For example, a Health Directive, POLST, or a discussion with a medical provider or your loved ones about your wishes): Yes, advance care planning is on file.       Fall risk  Fallen 2 or more times in the past year?: Yes  Any fall with injury in the past year?: No    Cognitive Screening   1) Repeat 3 items (Leader, Season, Table)    2) Clock draw: NORMAL  3) 3 item recall: Recalls 3 objects  Results: 3 items recalled: COGNITIVE IMPAIRMENT LESS LIKELY    Mini-CogTM Copyright MAREN Barlow. Licensed by the author for use in [facility]; reprinted with permission (jean@[facility]). All rights reserved.      Do you have sleep apnea, excessive snoring or daytime drowsiness? : yes   daytime drowsiness    Reviewed and updated as needed this visit by clinical staff   Tobacco  Allergies  Meds              Reviewed and updated as needed this visit by Provider                 Social History     Tobacco Use    Smoking status: Former     Packs/day: 0.50     Years: 4.00     Additional pack years: 0.00     Total pack years: 2.00     Types: Cigarettes     Start date: 1967     Quit date: 1971     Years since quittin.0    Smokeless tobacco: Never   Substance Use Topics    Alcohol use: No             2023     1:12 PM   Alcohol Use   Prescreen: >3 drinks/day or >7 drinks/week? Not Applicable       Do you have a current opioid prescription? No  Do you use any other controlled substances or medications that are not prescribed by a provider?  None        Urine Retention:  -- He required straight cath during the hospital stay in July with 500 mL return  --He was started on Flomax 7/11 with resolution of urine retention  -- PSA was normal  --historically has 2-3 nocturia - for many years  --we have continued flomax because he found it helpful; but now is noticing orthostatic dizziness  --he is still up 2-3 x nocturia  --blood pressure is running 110-120s at home.  Pulse is 50-60s    Allergies  --uses albuterol during allergy season and with URI;    Hypertension Follow-up    Do you check your blood pressure regularly outside of the clinic? Yes   Are you following a low salt diet? Yes  Are your blood pressures ever more than 140 on the top number (systolic) OR more than 90 on the bottom number (diastolic), for example 140/90? No    Current providers sharing in care for this patient include:   Patient Care Team:  Amy Chandler DO as PCP - General (Internal Medicine)  Amy Chandler DO as Assigned PCP  Bains RobertMD Benji as Assigned Surgical Provider    The following health maintenance items are reviewed in Epic and correct as of today:  Health Maintenance   Topic Date Due    MEDICARE ANNUAL WELLNESS VISIT  12/30/2023    RSV VACCINE (Pregnancy & 60+) (1 - 1-dose 60+ series) 01/03/2025 (Originally 1/17/2008)    COVID-19 Vaccine (9 - 2023-24 season) 02/28/2024    ANNUAL REVIEW OF HM ORDERS  07/21/2024    FALL RISK ASSESSMENT  01/03/2025    DTAP/TDAP/TD IMMUNIZATION (2 - Td or Tdap) 03/16/2025    LIPID  11/10/2026    ADVANCE CARE PLANNING  01/03/2029    COLORECTAL CANCER SCREENING  08/30/2032    HEPATITIS C SCREENING  Completed    PHQ-2 (once per calendar year)  Completed    INFLUENZA VACCINE  Completed    Pneumococcal Vaccine: 65+ Years  Completed    ZOSTER IMMUNIZATION  Completed    AORTIC ANEURYSM SCREENING (SYSTEM ASSIGNED)  Completed    IPV IMMUNIZATION  Aged Out    HPV IMMUNIZATION  Aged Out    MENINGITIS IMMUNIZATION  Aged Out    RSV  MONOCLONAL ANTIBODY  Aged Out     Current Outpatient Medications   Medication Sig Dispense Refill    albuterol (PROAIR HFA/PROVENTIL HFA/VENTOLIN HFA) 108 (90 Base) MCG/ACT inhaler Inhale 2 puffs into the lungs every 4 hours as needed for shortness of breath or wheezing 18 g 11    atenolol (TENORMIN) 50 MG tablet Take 1 tablet (50 mg) by mouth daily 90 tablet 3    famotidine (PEPCID) 40 MG tablet Take 1 tablet (40 mg) by mouth daily 90 tablet 3    losartan (COZAAR) 50 MG tablet Take 1 tablet (50 mg) by mouth daily 90 tablet 3    MULTIVITAMINS OR TABS Take 1 tablet by mouth daily 100 3    Probiotic Product (PRO-BIOTIC BLEND) CAPS Take 1 capsule by mouth daily      psyllium (METAMUCIL/KONSYL) 58.6 % powder Take 2 teaspoonful by mouth daily (before supper)      tamsulosin (FLOMAX) 0.4 MG capsule Take 1 capsule (0.4 mg) by mouth daily (with dinner) 90 capsule 3    acetaminophen (TYLENOL) 500 MG tablet Take 1,000 mg by mouth 3 times daily      acetaminophen (TYLENOL) 650 MG suppository Place 1 suppository (650 mg) rectally every 6 hours as needed for mild pain or other (and adjunct with moderate or severe pain or per patient request)             Review of Systems   Constitutional:  Negative for chills and fever.   HENT:  Positive for hearing loss. Negative for congestion, ear pain and sore throat.    Eyes:  Negative for pain and visual disturbance.   Respiratory:  Positive for cough. Negative for shortness of breath.    Cardiovascular:  Negative for chest pain, palpitations and peripheral edema.   Gastrointestinal:  Positive for abdominal pain and heartburn. Negative for constipation, diarrhea, hematochezia and nausea.   Genitourinary:  Negative for dysuria, frequency, genital sores, hematuria, impotence, penile discharge and urgency.   Musculoskeletal:  Negative for arthralgias, joint swelling and myalgias.   Skin:  Negative for rash.   Neurological:  Positive for dizziness. Negative for weakness, headaches and  "paresthesias.   Psychiatric/Behavioral:  Negative for mood changes. The patient is nervous/anxious.          OBJECTIVE:   /80 (BP Location: Right arm, Patient Position: Sitting, Cuff Size: Adult Regular)   Pulse 57   Temp (!) 96.2  F (35.7  C) (Tympanic)   Resp 12   Ht 1.65 m (5' 4.96\")   Wt 69.9 kg (154 lb 3.2 oz)   SpO2 97%   BMI 25.69 kg/m   Estimated body mass index is 25.69 kg/m  as calculated from the following:    Height as of this encounter: 1.65 m (5' 4.96\").    Weight as of this encounter: 69.9 kg (154 lb 3.2 oz).  Physical Exam  GENERAL: healthy, alert and no distress  EYES: Eyes grossly normal to inspection, PERRL and conjunctivae and sclerae normal  HENT: normal cephalic/atraumatic, right ear: normal: no effusions, no erythema, normal landmarks, left ear: occluded with wax and CMA lavaged with good results, canal clear and TM normal, nose and mouth without ulcers or lesions, oropharynx clear, and oral mucous membranes moist  NECK: no adenopathy, no asymmetry, masses, or scars and thyroid normal to palpation  RESP: lungs clear to auscultation - no rales, rhonchi or wheezes  CV: regular rate and rhythm, normal S1 S2, no S3 or S4, no murmur, click or rub, no peripheral edema and peripheral pulses strong  ABDOMEN: soft, nontender, bowel sounds normal, and large incisional hernia in the right right lower quadrant  MS: no gross musculoskeletal defects noted, no edema  SKIN: no suspicious lesions or rashes  NEURO: Normal strength and tone, mentation intact and speech normal  PSYCH: mentation appears normal, affect normal/bright        ASSESSMENT / PLAN:   (Z00.00) Encounter for Medicare annual wellness exam  (primary encounter diagnosis)  Comment:   Plan:     (I10) Essential hypertension  Comment: Decrease atenolol due to bradycardia.  Stopped the Flomax due to dizziness.  If prostate symptoms were to worsen, would consider finasteride  Plan: atenolol (TENORMIN) 25 MG tablet, losartan         " "(COZAAR) 50 MG tablet, OFFICE/OUTPT         VISIT,EST,LEVL IV, CBC with platelets, Basic         metabolic panel  (Ca, Cl, CO2, Creat, Gluc, K,         Na, BUN)            (K21.9) Gastroesophageal reflux disease without esophagitis  Comment:  - stable, refill provided  Plan: famotidine (PEPCID) 40 MG tablet, OFFICE/OUTPT         VISIT,EST,LEVL IV            (R73.03) Prediabetes  Comment: check   Plan: OFFICE/OUTPT VISIT,EST,LEVL IV, Hemoglobin A1c            (J30.2) Seasonal allergic rhinitis, unspecified trigger  Comment:  - stable, refill provided  Plan: albuterol (PROAIR HFA/PROVENTIL HFA/VENTOLIN         HFA) 108 (90 Base) MCG/ACT inhaler,         OFFICE/OUTPT VISIT,EST,LEVL IV            (Z13.6) CARDIOVASCULAR SCREENING; LDL GOAL LESS THAN 130  Comment:   Plan: OFFICE/OUTPT VISIT,EST,LEVL IV, Lipid panel         reflex to direct LDL Fasting            (H61.22) Impacted cerumen of left ear  Comment:   Plan: REMOVE IMPACTED CERUMEN            Patient has been advised of split billing requirements and indicates understanding: Yes      COUNSELING:  Reviewed preventive health counseling, as reflected in patient instructions      BMI:   Estimated body mass index is 25.69 kg/m  as calculated from the following:    Height as of this encounter: 1.65 m (5' 4.96\").    Weight as of this encounter: 69.9 kg (154 lb 3.2 oz).         He reports that he quit smoking about 53 years ago. His smoking use included cigarettes. He started smoking about 57 years ago. He has a 2 pack-year smoking history. He has never used smokeless tobacco.      Appropriate preventive services were discussed with this patient, including applicable screening as appropriate for fall prevention, nutrition, physical activity, Tobacco-use cessation, weight loss and cognition.  Checklist reviewing preventive services available has been given to the patient.    Reviewed patients plan of care and provided an AVS. The Complex Care Plan (for patients with " higher acuity and needing more deliberate coordination of services) for Willian meets the Care Plan requirement. This Care Plan has been established and reviewed with the Patient.          Amy Chandler DO  [facility]    Identified Health Risks:  I have reviewed Opioid Use Disorder and Substance Use Disorder risk factors and made any needed referrals. The patient was provided with suggestions to help him develop a healthy physical lifestyle.  The patient was provided with written information regarding signs of hearing loss.

## 2024-01-03 NOTE — RESULT ENCOUNTER NOTE
Kidney function, electrolytes are normal.  The cholesterol is improved compared to 4 years ago and is at goal.  The hemoglobin A1c is mildly elevated in the prediabetic range, similar to 2 years ago.  The hemoglobin has improved compared to 6 months ago and is nearly in normal limits.  Expect this will continue to improve with more time.  Other blood counts are normal.  Continue plan of care discussed at the time of visit.

## 2024-03-05 ENCOUNTER — ANESTHESIA EVENT (OUTPATIENT)
Dept: SURGERY | Facility: HOSPITAL | Age: 74
End: 2024-03-05
Payer: MEDICARE

## 2024-03-05 ENCOUNTER — HOSPITAL ENCOUNTER (OUTPATIENT)
Facility: HOSPITAL | Age: 74
Discharge: HOME | End: 2024-03-05
Attending: SURGERY | Admitting: SURGERY
Payer: MEDICARE

## 2024-03-05 ENCOUNTER — ANESTHESIA (OUTPATIENT)
Dept: SURGERY | Facility: HOSPITAL | Age: 74
End: 2024-03-05
Payer: MEDICARE

## 2024-03-05 VITALS
BODY MASS INDEX: 25.09 KG/M2 | SYSTOLIC BLOOD PRESSURE: 133 MMHG | TEMPERATURE: 98.1 F | HEART RATE: 69 BPM | OXYGEN SATURATION: 98 % | RESPIRATION RATE: 16 BRPM | WEIGHT: 150.6 LBS | DIASTOLIC BLOOD PRESSURE: 67 MMHG

## 2024-03-05 DIAGNOSIS — K43.0 INCISIONAL HERNIA OF ANTERIOR ABDOMINAL WALL WITH OBSTRUCTION: Primary | ICD-10-CM

## 2024-03-05 PROCEDURE — C1781 MESH (IMPLANTABLE): HCPCS | Performed by: SURGERY

## 2024-03-05 PROCEDURE — 250N000011 HC RX IP 250 OP 636: Performed by: ANESTHESIOLOGY

## 2024-03-05 PROCEDURE — 258N000003 HC RX IP 258 OP 636: Performed by: ANESTHESIOLOGY

## 2024-03-05 PROCEDURE — 360N000077 HC SURGERY LEVEL 4, PER MIN: Performed by: SURGERY

## 2024-03-05 PROCEDURE — 710N000012 HC RECOVERY PHASE 2, PER MINUTE: Performed by: SURGERY

## 2024-03-05 PROCEDURE — 250N000011 HC RX IP 250 OP 636: Performed by: SURGERY

## 2024-03-05 PROCEDURE — 250N000025 HC SEVOFLURANE, PER MIN: Performed by: SURGERY

## 2024-03-05 PROCEDURE — 250N000009 HC RX 250: Performed by: SURGERY

## 2024-03-05 PROCEDURE — 250N000009 HC RX 250: Performed by: ANESTHESIOLOGY

## 2024-03-05 PROCEDURE — 250N000013 HC RX MED GY IP 250 OP 250 PS 637: Performed by: ANESTHESIOLOGY

## 2024-03-05 PROCEDURE — 710N000009 HC RECOVERY PHASE 1, LEVEL 1, PER MIN: Performed by: SURGERY

## 2024-03-05 PROCEDURE — 999N000141 HC STATISTIC PRE-PROCEDURE NURSING ASSESSMENT: Performed by: SURGERY

## 2024-03-05 PROCEDURE — 370N000017 HC ANESTHESIA TECHNICAL FEE, PER MIN: Performed by: SURGERY

## 2024-03-05 PROCEDURE — 272N000001 HC OR GENERAL SUPPLY STERILE: Performed by: SURGERY

## 2024-03-05 DEVICE — COMPOSITE MESH MONOFILAMENT POLYPROPYLENE MESH WITH ABSORBABLE SYNTHETIC FILM AND MARKING
Type: IMPLANTABLE DEVICE | Site: ABDOMEN | Status: FUNCTIONAL
Brand: PARIETENE DS

## 2024-03-05 RX ORDER — ONDANSETRON 2 MG/ML
4 INJECTION INTRAMUSCULAR; INTRAVENOUS EVERY 30 MIN PRN
Status: DISCONTINUED | OUTPATIENT
Start: 2024-03-05 | End: 2024-03-05 | Stop reason: HOSPADM

## 2024-03-05 RX ORDER — ONDANSETRON 4 MG/1
4 TABLET, ORALLY DISINTEGRATING ORAL EVERY 30 MIN PRN
Status: DISCONTINUED | OUTPATIENT
Start: 2024-03-05 | End: 2024-03-05 | Stop reason: HOSPADM

## 2024-03-05 RX ORDER — HYDROMORPHONE HYDROCHLORIDE 1 MG/ML
0.2 INJECTION, SOLUTION INTRAMUSCULAR; INTRAVENOUS; SUBCUTANEOUS EVERY 5 MIN PRN
Status: DISCONTINUED | OUTPATIENT
Start: 2024-03-05 | End: 2024-03-05 | Stop reason: HOSPADM

## 2024-03-05 RX ORDER — LIDOCAINE HYDROCHLORIDE 10 MG/ML
INJECTION, SOLUTION INFILTRATION; PERINEURAL PRN
Status: DISCONTINUED | OUTPATIENT
Start: 2024-03-05 | End: 2024-03-05

## 2024-03-05 RX ORDER — NALOXONE HYDROCHLORIDE 0.4 MG/ML
0.1 INJECTION, SOLUTION INTRAMUSCULAR; INTRAVENOUS; SUBCUTANEOUS
Status: DISCONTINUED | OUTPATIENT
Start: 2024-03-05 | End: 2024-03-05 | Stop reason: HOSPADM

## 2024-03-05 RX ORDER — FENTANYL CITRATE 50 UG/ML
50 INJECTION, SOLUTION INTRAMUSCULAR; INTRAVENOUS EVERY 5 MIN PRN
Status: DISCONTINUED | OUTPATIENT
Start: 2024-03-05 | End: 2024-03-05 | Stop reason: HOSPADM

## 2024-03-05 RX ORDER — SODIUM CHLORIDE, SODIUM LACTATE, POTASSIUM CHLORIDE, CALCIUM CHLORIDE 600; 310; 30; 20 MG/100ML; MG/100ML; MG/100ML; MG/100ML
INJECTION, SOLUTION INTRAVENOUS CONTINUOUS
Status: DISCONTINUED | OUTPATIENT
Start: 2024-03-05 | End: 2024-03-05 | Stop reason: HOSPADM

## 2024-03-05 RX ORDER — BUPIVACAINE HYDROCHLORIDE AND EPINEPHRINE 2.5; 5 MG/ML; UG/ML
INJECTION, SOLUTION INFILTRATION; PERINEURAL PRN
Status: DISCONTINUED | OUTPATIENT
Start: 2024-03-05 | End: 2024-03-05 | Stop reason: HOSPADM

## 2024-03-05 RX ORDER — LIDOCAINE 40 MG/G
CREAM TOPICAL
Status: DISCONTINUED | OUTPATIENT
Start: 2024-03-05 | End: 2024-03-05 | Stop reason: HOSPADM

## 2024-03-05 RX ORDER — GLYCOPYRROLATE 0.2 MG/ML
INJECTION, SOLUTION INTRAMUSCULAR; INTRAVENOUS PRN
Status: DISCONTINUED | OUTPATIENT
Start: 2024-03-05 | End: 2024-03-05

## 2024-03-05 RX ORDER — FENTANYL CITRATE 50 UG/ML
INJECTION, SOLUTION INTRAMUSCULAR; INTRAVENOUS PRN
Status: DISCONTINUED | OUTPATIENT
Start: 2024-03-05 | End: 2024-03-05

## 2024-03-05 RX ORDER — ONDANSETRON 2 MG/ML
INJECTION INTRAMUSCULAR; INTRAVENOUS PRN
Status: DISCONTINUED | OUTPATIENT
Start: 2024-03-05 | End: 2024-03-05

## 2024-03-05 RX ORDER — HYDROMORPHONE HYDROCHLORIDE 1 MG/ML
0.4 INJECTION, SOLUTION INTRAMUSCULAR; INTRAVENOUS; SUBCUTANEOUS EVERY 5 MIN PRN
Status: DISCONTINUED | OUTPATIENT
Start: 2024-03-05 | End: 2024-03-05 | Stop reason: HOSPADM

## 2024-03-05 RX ORDER — OXYCODONE HYDROCHLORIDE 5 MG/1
10 TABLET ORAL
Status: COMPLETED | OUTPATIENT
Start: 2024-03-05 | End: 2024-03-05

## 2024-03-05 RX ORDER — CEFAZOLIN SODIUM/WATER 2 G/20 ML
2 SYRINGE (ML) INTRAVENOUS SEE ADMIN INSTRUCTIONS
Status: DISCONTINUED | OUTPATIENT
Start: 2024-03-05 | End: 2024-03-05 | Stop reason: HOSPADM

## 2024-03-05 RX ORDER — HYDROCODONE BITARTRATE AND ACETAMINOPHEN 5; 325 MG/1; MG/1
1-2 TABLET ORAL EVERY 4 HOURS PRN
Qty: 20 TABLET | Refills: 0 | Status: SHIPPED | OUTPATIENT
Start: 2024-03-05

## 2024-03-05 RX ORDER — EPHEDRINE SULFATE 50 MG/ML
INJECTION, SOLUTION INTRAMUSCULAR; INTRAVENOUS; SUBCUTANEOUS PRN
Status: DISCONTINUED | OUTPATIENT
Start: 2024-03-05 | End: 2024-03-05

## 2024-03-05 RX ORDER — FENTANYL CITRATE 50 UG/ML
25 INJECTION, SOLUTION INTRAMUSCULAR; INTRAVENOUS EVERY 5 MIN PRN
Status: DISCONTINUED | OUTPATIENT
Start: 2024-03-05 | End: 2024-03-05 | Stop reason: HOSPADM

## 2024-03-05 RX ORDER — OXYCODONE HYDROCHLORIDE 5 MG/1
5 TABLET ORAL
Status: COMPLETED | OUTPATIENT
Start: 2024-03-05 | End: 2024-03-05

## 2024-03-05 RX ORDER — DEXAMETHASONE SODIUM PHOSPHATE 10 MG/ML
INJECTION, SOLUTION INTRAMUSCULAR; INTRAVENOUS PRN
Status: DISCONTINUED | OUTPATIENT
Start: 2024-03-05 | End: 2024-03-05

## 2024-03-05 RX ORDER — PROPOFOL 10 MG/ML
INJECTION, EMULSION INTRAVENOUS PRN
Status: DISCONTINUED | OUTPATIENT
Start: 2024-03-05 | End: 2024-03-05

## 2024-03-05 RX ORDER — CEFAZOLIN SODIUM/WATER 2 G/20 ML
2 SYRINGE (ML) INTRAVENOUS
Status: COMPLETED | OUTPATIENT
Start: 2024-03-05 | End: 2024-03-05

## 2024-03-05 RX ADMIN — SUGAMMADEX 200 MG: 100 INJECTION, SOLUTION INTRAVENOUS at 12:36

## 2024-03-05 RX ADMIN — FENTANYL CITRATE 100 MCG: 50 INJECTION INTRAMUSCULAR; INTRAVENOUS at 11:34

## 2024-03-05 RX ADMIN — SODIUM CHLORIDE, POTASSIUM CHLORIDE, SODIUM LACTATE AND CALCIUM CHLORIDE: 600; 310; 30; 20 INJECTION, SOLUTION INTRAVENOUS at 13:22

## 2024-03-05 RX ADMIN — Medication 5 MG: at 12:22

## 2024-03-05 RX ADMIN — DEXAMETHASONE SODIUM PHOSPHATE 4 MG: 10 INJECTION, SOLUTION INTRAMUSCULAR; INTRAVENOUS at 11:49

## 2024-03-05 RX ADMIN — FENTANYL CITRATE 50 MCG: 50 INJECTION INTRAMUSCULAR; INTRAVENOUS at 11:57

## 2024-03-05 RX ADMIN — LIDOCAINE HYDROCHLORIDE 5 ML: 10 INJECTION, SOLUTION INFILTRATION; PERINEURAL at 11:34

## 2024-03-05 RX ADMIN — SODIUM CHLORIDE, POTASSIUM CHLORIDE, SODIUM LACTATE AND CALCIUM CHLORIDE: 600; 310; 30; 20 INJECTION, SOLUTION INTRAVENOUS at 10:35

## 2024-03-05 RX ADMIN — PROPOFOL 150 MG: 10 INJECTION, EMULSION INTRAVENOUS at 11:34

## 2024-03-05 RX ADMIN — HYDROMORPHONE HYDROCHLORIDE 0.2 MG: 1 INJECTION, SOLUTION INTRAMUSCULAR; INTRAVENOUS; SUBCUTANEOUS at 13:24

## 2024-03-05 RX ADMIN — FENTANYL CITRATE 50 MCG: 50 INJECTION, SOLUTION INTRAMUSCULAR; INTRAVENOUS at 13:08

## 2024-03-05 RX ADMIN — ONDANSETRON 4 MG: 2 INJECTION INTRAMUSCULAR; INTRAVENOUS at 12:24

## 2024-03-05 RX ADMIN — PHENYLEPHRINE HYDROCHLORIDE 50 MCG: 10 INJECTION INTRAVENOUS at 12:22

## 2024-03-05 RX ADMIN — PHENYLEPHRINE HYDROCHLORIDE 100 MCG: 10 INJECTION INTRAVENOUS at 11:48

## 2024-03-05 RX ADMIN — Medication 5 MG: at 11:48

## 2024-03-05 RX ADMIN — OXYCODONE HYDROCHLORIDE 5 MG: 5 TABLET ORAL at 14:39

## 2024-03-05 RX ADMIN — OXYCODONE HYDROCHLORIDE 5 MG: 5 TABLET ORAL at 15:10

## 2024-03-05 RX ADMIN — PHENYLEPHRINE HYDROCHLORIDE 100 MCG: 10 INJECTION INTRAVENOUS at 11:45

## 2024-03-05 RX ADMIN — Medication 10 MG: at 11:44

## 2024-03-05 RX ADMIN — PROPOFOL 40 MG: 10 INJECTION, EMULSION INTRAVENOUS at 11:51

## 2024-03-05 RX ADMIN — Medication 2 G: at 11:26

## 2024-03-05 RX ADMIN — ROCURONIUM BROMIDE 50 MG: 50 INJECTION, SOLUTION INTRAVENOUS at 11:35

## 2024-03-05 RX ADMIN — Medication 5 MG: at 11:42

## 2024-03-05 RX ADMIN — FENTANYL CITRATE 50 MCG: 50 INJECTION, SOLUTION INTRAMUSCULAR; INTRAVENOUS at 13:16

## 2024-03-05 RX ADMIN — GLYCOPYRROLATE 0.2 MG: 0.2 INJECTION INTRAMUSCULAR; INTRAVENOUS at 11:45

## 2024-03-05 RX ADMIN — FENTANYL CITRATE 50 MCG: 50 INJECTION INTRAMUSCULAR; INTRAVENOUS at 12:01

## 2024-03-05 ASSESSMENT — ACTIVITIES OF DAILY LIVING (ADL)
ADLS_ACUITY_SCORE: 38
ADLS_ACUITY_SCORE: 23

## 2024-03-05 NOTE — ANESTHESIA PREPROCEDURE EVALUATION
Anesthesia Pre-Procedure Evaluation    Patient: Willian Reid   MRN: [id number] : 1950        Procedure : Procedure(s):  LAPAROSCOPIC REPAIR OF INCARCERATED VENTRAL INCISIONAL HERNIA          Past Medical History:   Diagnosis Date    Arthritis     Diverticulitis of colon (without mention of hemorrhage)(562.11) 1980    Gastroesophageal reflux disease with esophagitis     Hypertension     Uncomplicated asthma       Past Surgical History:   Procedure Laterality Date    ABDOMEN SURGERY      9 inch colon removed due to abscess    BIOPSY      COLONOSCOPY      COLONOSCOPY N/A 2022    Procedure: COLONOSCOPY, FLEXIBLE, WITH LESION REMOVAL USING SNARE;  Surgeon: Solitario Winter MD;  Location: [facility]    FLEXIBLE SIGMOIDOSCOPY  2005     Flexible sigmoidoscopy probably should be changed to a colonoscopy at his next visit to get further and to help him with sedation, so in  he should have a colonoscopy and then every 10 years afterwards    GI SURGERY      HERNIA REPAIR      INCISION AND DRAINAGE TRUNK, COMBINED N/A 7/10/2023    Procedure: INCISION AND DRAINAGE, TORSO;  Surgeon: Kody Murguia MD;  [facility]: [facility] OR    LAPAROSCOPIC CHOLECYSTECTOMY N/A 2022    Procedure: CHOLECYSTECTOMY, LAPAROSCOPIC;  Surgeon: Joya Bains MD;  Location: [facility]    ORTHOPEDIC SURGERY        Allergies   Allergen Reactions    Tetracycline Rash      Social History     Tobacco Use    Smoking status: Former     Packs/day: 0.50     Years: 4.00     Additional pack years: 0.00     Total pack years: 2.00     Types: Cigarettes     Start date: 1967     Quit date: 1971     Years since quittin.2    Smokeless tobacco: Never   Substance Use Topics    Alcohol use: No      Wt Readings from Last 1 Encounters:   24 68.3 kg (150 lb 9.6 oz)        Anesthesia Evaluation            ROS/MED HX  ENT/Pulmonary:  - neg pulmonary ROS   (+)                      asthma                  Neurologic:  -  "neg neurologic ROS     Cardiovascular:  - neg cardiovascular ROS   (+)  hypertension- -   -  - -                                      METS/Exercise Tolerance: >4 METS    Hematologic:  - neg hematologic  ROS     Musculoskeletal:  - neg musculoskeletal ROS     GI/Hepatic:  - neg GI/hepatic ROS   (+) GERD,                   Renal/Genitourinary:  - neg Renal ROS     Endo:  - neg endo ROS     Psychiatric/Substance Use:  - neg psychiatric ROS     Infectious Disease:  - neg infectious disease ROS     Malignancy:  - neg malignancy ROS     Other:  - neg other ROS          Physical Exam    Airway        Mallampati: II    Neck ROM: full     Respiratory Devices and Support         Dental           Cardiovascular   cardiovascular exam normal          Pulmonary   pulmonary exam normal                OUTSIDE LABS:  CBC:   Lab Results   Component Value Date    WBC 9.2 01/03/2024    WBC 11.0 07/28/2023    HGB 13.2 (L) 01/03/2024    HGB 11.0 (L) 07/28/2023    HCT 41.5 01/03/2024    HCT 35.6 (L) 07/28/2023     01/03/2024     07/28/2023     BMP:   Lab Results   Component Value Date     01/03/2024     07/28/2023    POTASSIUM 4.7 01/03/2024    POTASSIUM 4.3 07/28/2023    CHLORIDE 105 01/03/2024    CHLORIDE 103 07/28/2023    CO2 28 01/03/2024    CO2 26 07/28/2023    BUN 17.8 01/03/2024    BUN 18.1 07/28/2023    CR 1.17 01/03/2024    CR 1.07 07/28/2023    GLC 98 01/03/2024     (H) 07/28/2023     COAGS:   Lab Results   Component Value Date    INR 1.08 06/12/2023     POC: No results found for: \"BGM\", \"HCG\", \"HCGS\"  HEPATIC:   Lab Results   Component Value Date    ALBUMIN 4.3 07/08/2023    PROTTOTAL 9.3 (H) 07/08/2023    ALT 13 07/08/2023    AST 27 07/08/2023    ALKPHOS 109 07/08/2023    BILITOTAL 0.4 07/08/2023     OTHER:   Lab Results   Component Value Date    PH 7.38 07/09/2023    LACT 0.8 07/08/2023    A1C 5.7 (H) 01/03/2024    ROSSY 10.0 01/03/2024    PHOS 2.5 06/10/2022    MAG 2.1 07/10/2023    LIPASE 26 " 05/05/2023    TSH 0.81 03/02/2009    T4 1.11 03/02/2009       Anesthesia Plan    ASA Status:  3       Anesthesia Type: General.     - Airway: ETT   Induction: Intravenous.           Consents    Anesthesia Plan(s) and associated risks, benefits, and realistic alternatives discussed. Questions answered and patient/representative(s) expressed understanding.     - Discussed:     - Discussed with:  Patient            Postoperative Care       PONV prophylaxis: Ondansetron (or other 5HT-3), Dexamethasone or Solumedrol     Comments:               Salvador Sorenson MD    I have reviewed the pertinent notes and labs in the chart from the past 30 days and (re)examined the patient.  Any updates or changes from those notes are reflected in this note.

## 2024-03-05 NOTE — ANESTHESIA PROCEDURE NOTES
Airway       Patient location during procedure: OR       Procedure Start/Stop Times: 3/5/2024 11:37 AM  Staff -        CRNA: Mona Logan APRN CRNA       Performed By: CRNA  Consent for Airway        Urgency: elective  Indications and Patient Condition       Indications for airway management: kaylyn-procedural       Induction type:intravenous       Mask difficulty assessment: 2 - vent by mask + OA or adjuvant +/- NMBA    Final Airway Details       Final airway type: endotracheal airway       Successful airway: ETT - single  Endotracheal Airway Details        ETT size (mm): 7.5       Cuffed: yes       Successful intubation technique: direct laryngoscopy       DL Blade Type: MAC 4       Grade View of Cords: 1       Adjucts: stylet       Position: Right       Measured from: gums/teeth       Secured at (cm): 24       Bite block used: None    Post intubation assessment        Placement verified by: capnometry, equal breath sounds and chest rise        Number of attempts at approach: 1       Number of other approaches attempted: 0       Secured with: tape       Ease of procedure: easy       Dentition: Intact and Unchanged    Medication(s) Administered   Medication Administration Time: 3/5/2024 11:37 AM

## 2024-03-05 NOTE — OP NOTE
OPERATIVE REPORT    Willian Reid  [facility]  Medical Record #:  [id number]  YOB: 1950  Age:  74 year old    PROCEDURE DATE:  3/5/2024    PREOPERATIVE DIAGNOSIS: Incarcerated ventral incisional hernia at 8 centimeter diameter aperture    POSTOPERATIVE DIAGNOSIS: Same    PROCEDURE: Repair of incarcerated 8 cm ventral incisional hernia with Perietene mesh 15 x 20 cm lysis of adhesions    OPERATING SURGEON:  Kody Murguia MD    ASSISTANT: Technician    ANESTHESIA: General    DESCRIPTION OF PROCEDURE: With the patient in supine position under general anesthesia having reviewed the risk benefits and complications of surgical intervention with him abdomen is prepped and draped in usual sterile fashion.  Pneumoperitoneum is instilled in the right and left upper portions of the abdomen without difficulty and 1 additional 5 and 10 mm trocar placed in the left side of the abdomen.  Lysis of significant adhesions was accomplished.  The firm edge of the hernia in the right lower portion of the abdomen is isolated additional lysis of adhesions complex with care to avoid injury of visceral structures.  Propria measurements are made in the 15 x 20 cm portion of mesh is placed via the left side 10 mm trocar and it is secured with stay sutures and it is then affixed with the intraluminal stapling device circumferentially.  Time was taken to ensure hemostasis was obtained all trocar sites inspected for the absence of bleeding procedure terminated with closure of skin wounds with interrupted 4-0 Vicryl subcuticular suture and Dermabond.  The estimated blood loss was minimal there were no complications and the patient tolerated the procedure well.  Sponge and needle counts are correct x 2.  As a result of the need for significant lysis of adhesions in the circumstance and the anatomic location of this large right lower quadrant abdominal hernia without posterior fascia this will be coded under-2 2  modifier.    EBL:  [unfilled]    SPECIMENS:  * No specimens in log *    Kody ware md  [facility]

## 2024-03-05 NOTE — ANESTHESIA POSTPROCEDURE EVALUATION
Patient: Willian Reid    Procedure: Procedure(s):  LAPAROSCOPIC REPAIR OF INCARCERATED VENTRAL INCISIONAL HERNIA WITH MESH       Anesthesia Type:  General    Note:  Disposition: Inpatient   Postop Pain Control: Uneventful            Sign Out: Well controlled pain   PONV: No   Neuro/Psych: Uneventful            Sign Out: Acceptable/Baseline neuro status   Airway/Respiratory: Uneventful            Sign Out: Acceptable/Baseline resp. status   CV/Hemodynamics: Uneventful            Sign Out: Acceptable CV status; No obvious hypovolemia; No obvious fluid overload   Other NRE:    DID A NON-ROUTINE EVENT OCCUR?            Last vitals:  Vitals Value Taken Time   /59 03/05/24 1300   Temp 36.7  C (98.1  F) 03/05/24 1250   Pulse 87 03/05/24 1302   Resp 16 03/05/24 1302   SpO2 100 % 03/05/24 1302   Vitals shown include unfiled device data.    Electronically Signed By: Salvador Sorenson MD  March 5, 2024  1:04 PM

## 2024-03-05 NOTE — ANESTHESIA CARE TRANSFER NOTE
Patient: Willian Reid    Procedure: Procedure(s):  LAPAROSCOPIC REPAIR OF INCARCERATED VENTRAL INCISIONAL HERNIA WITH MESH       Diagnosis: Hernia of abdominal wall [K43.9]  Diagnosis Additional Information: No value filed.    Anesthesia Type:   General     Note:    Oropharynx: oropharynx clear of all foreign objects  Level of Consciousness: awake  Oxygen Supplementation: face mask  Level of Supplemental Oxygen (L/min / FiO2): 8  Independent Airway: airway patency satisfactory and stable  Dentition: dentition unchanged  Vital Signs Stable: post-procedure vital signs reviewed and stable  Report to RN Given: handoff report given  Patient transferred to: PACU    Handoff Report: Identifed the Patient, Identified the Reponsible Provider, Reviewed the pertinent medical history, Discussed the surgical course, Reviewed Intra-OP anesthesia mangement and issues during anesthesia, Set expectations for post-procedure period and Allowed opportunity for questions and acknowledgement of understanding      Vitals:  Vitals Value Taken Time   /67    Temp 98.1    Pulse 104 03/05/24 1249   Resp 20 03/05/24 1249   SpO2 100 % 03/05/24 1249   Vitals shown include unfiled device data.    Electronically Signed By: CECELIA Anderson CRNA  March 5, 2024  12:50 PM

## 2025-01-09 DIAGNOSIS — I10 ESSENTIAL HYPERTENSION: ICD-10-CM

## 2025-01-09 RX ORDER — ATENOLOL 25 MG/1
25 TABLET ORAL DAILY
Qty: 30 TABLET | Refills: 0 | Status: SHIPPED | OUTPATIENT
Start: 2025-01-09

## 2025-01-23 SDOH — HEALTH STABILITY: PHYSICAL HEALTH: ON AVERAGE, HOW MANY MINUTES DO YOU ENGAGE IN EXERCISE AT THIS LEVEL?: 60 MIN

## 2025-01-23 SDOH — HEALTH STABILITY: PHYSICAL HEALTH: ON AVERAGE, HOW MANY DAYS PER WEEK DO YOU ENGAGE IN MODERATE TO STRENUOUS EXERCISE (LIKE A BRISK WALK)?: 7 DAYS

## 2025-01-23 ASSESSMENT — SOCIAL DETERMINANTS OF HEALTH (SDOH): HOW OFTEN DO YOU GET TOGETHER WITH FRIENDS OR RELATIVES?: MORE THAN THREE TIMES A WEEK

## 2025-01-28 ENCOUNTER — OFFICE VISIT (OUTPATIENT)
Dept: FAMILY MEDICINE | Facility: CLINIC | Age: 75
End: 2025-01-28
Attending: INTERNAL MEDICINE
Payer: MEDICARE

## 2025-01-28 VITALS
TEMPERATURE: 97.2 F | RESPIRATION RATE: 12 BRPM | WEIGHT: 153 LBS | HEART RATE: 51 BPM | SYSTOLIC BLOOD PRESSURE: 120 MMHG | BODY MASS INDEX: 25.49 KG/M2 | DIASTOLIC BLOOD PRESSURE: 84 MMHG | OXYGEN SATURATION: 98 % | HEIGHT: 65 IN

## 2025-01-28 DIAGNOSIS — E78.1 HYPERTRIGLYCERIDEMIA: ICD-10-CM

## 2025-01-28 DIAGNOSIS — Z00.00 ENCOUNTER FOR MEDICARE ANNUAL WELLNESS EXAM: Primary | ICD-10-CM

## 2025-01-28 DIAGNOSIS — J30.2 SEASONAL ALLERGIC RHINITIS, UNSPECIFIED TRIGGER: ICD-10-CM

## 2025-01-28 DIAGNOSIS — K21.9 GASTROESOPHAGEAL REFLUX DISEASE WITHOUT ESOPHAGITIS: ICD-10-CM

## 2025-01-28 DIAGNOSIS — R73.03 PREDIABETES: ICD-10-CM

## 2025-01-28 DIAGNOSIS — I10 ESSENTIAL HYPERTENSION: ICD-10-CM

## 2025-01-28 PROBLEM — J20.9 BRONCHITIS WITH BRONCHOSPASM: Status: RESOLVED | Noted: 2021-11-10 | Resolved: 2025-01-28

## 2025-01-28 PROBLEM — K68.12 PSOAS MUSCLE ABSCESS (H): Status: RESOLVED | Noted: 2023-05-16 | Resolved: 2025-01-28

## 2025-01-28 PROBLEM — L02.211 ABDOMINAL WALL ABSCESS: Status: RESOLVED | Noted: 2023-07-08 | Resolved: 2025-01-28

## 2025-01-28 LAB
ANION GAP SERPL CALCULATED.3IONS-SCNC: 11 MMOL/L (ref 7–15)
BUN SERPL-MCNC: 19 MG/DL (ref 8–23)
CALCIUM SERPL-MCNC: 10.2 MG/DL (ref 8.8–10.4)
CHLORIDE SERPL-SCNC: 102 MMOL/L (ref 98–107)
CHOLEST SERPL-MCNC: 144 MG/DL
CREAT SERPL-MCNC: 1.25 MG/DL (ref 0.67–1.17)
EGFRCR SERPLBLD CKD-EPI 2021: 59 ML/MIN/1.73M2
ERYTHROCYTE [DISTWIDTH] IN BLOOD BY AUTOMATED COUNT: 13.5 % (ref 10–15)
EST. AVERAGE GLUCOSE BLD GHB EST-MCNC: 117 MG/DL
FASTING STATUS PATIENT QL REPORTED: YES
FASTING STATUS PATIENT QL REPORTED: YES
GLUCOSE SERPL-MCNC: 100 MG/DL (ref 70–99)
HBA1C MFR BLD: 5.7 % (ref 0–5.6)
HCO3 SERPL-SCNC: 27 MMOL/L (ref 22–29)
HCT VFR BLD AUTO: 44 % (ref 40–53)
HDLC SERPL-MCNC: 42 MG/DL
HGB BLD-MCNC: 14.4 G/DL (ref 13.3–17.7)
LDLC SERPL CALC-MCNC: 80 MG/DL
MCH RBC QN AUTO: 30.3 PG (ref 26.5–33)
MCHC RBC AUTO-ENTMCNC: 32.7 G/DL (ref 31.5–36.5)
MCV RBC AUTO: 93 FL (ref 78–100)
NONHDLC SERPL-MCNC: 102 MG/DL
PLATELET # BLD AUTO: 380 10E3/UL (ref 150–450)
POTASSIUM SERPL-SCNC: 4.3 MMOL/L (ref 3.4–5.3)
RBC # BLD AUTO: 4.75 10E6/UL (ref 4.4–5.9)
SODIUM SERPL-SCNC: 140 MMOL/L (ref 135–145)
TRIGL SERPL-MCNC: 108 MG/DL
WBC # BLD AUTO: 10.2 10E3/UL (ref 4–11)

## 2025-01-28 PROCEDURE — 99214 OFFICE O/P EST MOD 30 MIN: CPT | Mod: 25 | Performed by: INTERNAL MEDICINE

## 2025-01-28 PROCEDURE — G0439 PPPS, SUBSEQ VISIT: HCPCS | Performed by: INTERNAL MEDICINE

## 2025-01-28 PROCEDURE — 80061 LIPID PANEL: CPT | Performed by: INTERNAL MEDICINE

## 2025-01-28 PROCEDURE — 85027 COMPLETE CBC AUTOMATED: CPT | Performed by: INTERNAL MEDICINE

## 2025-01-28 PROCEDURE — 80048 BASIC METABOLIC PNL TOTAL CA: CPT | Performed by: INTERNAL MEDICINE

## 2025-01-28 PROCEDURE — 83036 HEMOGLOBIN GLYCOSYLATED A1C: CPT | Performed by: INTERNAL MEDICINE

## 2025-01-28 PROCEDURE — 36415 COLL VENOUS BLD VENIPUNCTURE: CPT | Performed by: INTERNAL MEDICINE

## 2025-01-28 RX ORDER — ALBUTEROL SULFATE 90 UG/1
2 INHALANT RESPIRATORY (INHALATION) EVERY 4 HOURS PRN
Qty: 18 G | Refills: 11 | Status: SHIPPED | OUTPATIENT
Start: 2025-01-28

## 2025-01-28 RX ORDER — LOSARTAN POTASSIUM 50 MG/1
50 TABLET ORAL DAILY
Qty: 90 TABLET | Refills: 3 | Status: SHIPPED | OUTPATIENT
Start: 2025-01-28

## 2025-01-28 RX ORDER — ATENOLOL 25 MG/1
25 TABLET ORAL DAILY
Qty: 90 TABLET | Refills: 3 | Status: SHIPPED | OUTPATIENT
Start: 2025-01-28

## 2025-01-28 RX ORDER — FAMOTIDINE 40 MG/1
40 TABLET, FILM COATED ORAL DAILY
Qty: 90 TABLET | Refills: 3 | Status: SHIPPED | OUTPATIENT
Start: 2025-01-28

## 2025-01-28 ASSESSMENT — PAIN SCALES - GENERAL: PAINLEVEL_OUTOF10: NO PAIN (0)

## 2025-01-28 NOTE — PATIENT INSTRUCTIONS
Patient Education   Preventive Care Advice   This is general advice given by our system to help you stay healthy. However, your care team may have specific advice just for you. Please talk to your care team about your preventive care needs.  Nutrition  Eat 5 or more servings of fruits and vegetables each day.  Try wheat bread, brown rice and whole grain pasta (instead of white bread, rice, and pasta).  Get enough calcium and vitamin D. Check the label on foods and aim for 100% of the RDA (recommended daily allowance).  Lifestyle  Exercise at least 150 minutes each week  (30 minutes a day, 5 days a week).  Do muscle strengthening activities 2 days a week. These help control your weight and prevent disease.  No smoking.  Wear sunscreen to prevent skin cancer.  Have a dental exam and cleaning every 6 months.  Yearly exams  See your health care team every year to talk about:  Any changes in your health.  Any medicines your care team has prescribed.  Preventive care, family planning, and ways to prevent chronic diseases.  Shots (vaccines)   HPV shots (up to age 26), if you've never had them before.  Hepatitis B shots (up to age 59), if you've never had them before.  COVID-19 shot: Get this shot when it's due.  Flu shot: Get a flu shot every year.  Tetanus shot: Get a tetanus shot every 10 years.  Pneumococcal, hepatitis A, and RSV shots: Ask your care team if you need these based on your risk.  Shingles shot (for age 50 and up)  General health tests  Diabetes screening:  Starting at age 35, Get screened for diabetes at least every 3 years.  If you are younger than age 35, ask your care team if you should be screened for diabetes.  Cholesterol test: At age 39, start having a cholesterol test every 5 years, or more often if advised.  Bone density scan (DEXA): At age 50, ask your care team if you should have this scan for osteoporosis (brittle bones).  Hepatitis C: Get tested at least once in your life.  STIs (sexually  transmitted infections)  Before age 24: Ask your care team if you should be screened for STIs.  After age 24: Get screened for STIs if you're at risk. You are at risk for STIs (including HIV) if:  You are sexually active with more than one person.  You don't use condoms every time.  You or a partner was diagnosed with a sexually transmitted infection.  If you are at risk for HIV, ask about PrEP medicine to prevent HIV.  Get tested for HIV at least once in your life, whether you are at risk for HIV or not.  Cancer screening tests  Cervical cancer screening: If you have a cervix, begin getting regular cervical cancer screening tests starting at age 21.  Breast cancer scan (mammogram): If you've ever had breasts, begin having regular mammograms starting at age 40. This is a scan to check for breast cancer.  Colon cancer screening: It is important to start screening for colon cancer at age 45.  Have a colonoscopy test every 10 years (or more often if you're at risk) Or, ask your provider about stool tests like a FIT test every year or Cologuard test every 3 years.  To learn more about your testing options, visit:   .  For help making a decision, visit:   https://bit.ly/se26004.  Prostate cancer screening test: If you have a prostate, ask your care team if a prostate cancer screening test (PSA) at age 55 is right for you.  Lung cancer screening: If you are a current or former smoker ages 50 to 80, ask your care team if ongoing lung cancer screenings are right for you.  For informational purposes only. Not to replace the advice of your health care provider. Copyright   2023 [facility]. All rights reserved. Clinically reviewed by the [facility]. [employer] 032155 - REV 01/24.  Learning About Stress  What is stress?     Stress is your body's response to a hard situation. Your body can have a physical, emotional, or mental response. Stress is a fact of life for most people, and it  affects everyone differently. What causes stress for you may not be stressful for someone else.  A lot of things can cause stress. You may feel stress when you go on a job interview, take a test, or run a race. This kind of short-term stress is normal and even useful. It can help you if you need to work hard or react quickly. For example, stress can help you finish an important job on time.  Long-term stress is caused by ongoing stressful situations or events. Examples of long-term stress include long-term health problems, ongoing problems at work, or conflicts in your family. Long-term stress can harm your health.  How does stress affect your health?  When you are stressed, your body responds as though you are in danger. It makes hormones that speed up your heart, make you breathe faster, and give you a burst of energy. This is called the fight-or-flight stress response. If the stress is over quickly, your body goes back to normal and no harm is done.  But if stress happens too often or lasts too long, it can have bad effects. Long-term stress can make you more likely to get sick, and it can make symptoms of some diseases worse. If you tense up when you are stressed, you may develop neck, shoulder, or low back pain. Stress is linked to high blood pressure and heart disease.  Stress also harms your emotional health. It can make you beasley, tense, or depressed. Your relationships may suffer, and you may not do well at work or school.  What can you do to manage stress?  You can try these things to help manage stress:   Do something active. Exercise or activity can help reduce stress. Walking is a great way to get started. Even everyday activities such as housecleaning or yard work can help.  Try yoga or jina chi. These techniques combine exercise and meditation. You may need some training at first to learn them.  Do something you enjoy. For example, listen to music or go to a movie. Practice your hobby or do volunteer  "work.  Meditate. This can help you relax, because you are not worrying about what happened before or what may happen in the future.  Do guided imagery. Imagine yourself in any setting that helps you feel calm. You can use online videos, books, or a teacher to guide you.  Do breathing exercises. For example:  From a standing position, bend forward from the waist with your knees slightly bent. Let your arms dangle close to the floor.  Breathe in slowly and deeply as you return to a standing position. Roll up slowly and lift your head last.  Hold your breath for just a few seconds in the standing position.  Breathe out slowly and bend forward from the waist.  Let your feelings out. Talk, laugh, cry, and express anger when you need to. Talking with supportive friends or family, a counselor, or a jamila leader about your feelings is a healthy way to relieve stress. Avoid discussing your feelings with people who make you feel worse.  Write. It may help to write about things that are bothering you. This helps you find out how much stress you feel and what is causing it. When you know this, you can find better ways to cope.  What can you do to prevent stress?  You might try some of these things to help prevent stress:  Manage your time. This helps you find time to do the things you want and need to do.  Get enough sleep. Your body recovers from the stresses of the day while you are sleeping.  Get support. Your family, friends, and community can make a difference in how you experience stress.  Limit your news feed. Avoid or limit time on social media or news that may make you feel stressed.  Do something active. Exercise or activity can help reduce stress. Walking is a great way to get started.  Where can you learn more?  Go to https://www.[employer].net/patiented  Enter N032 in the search box to learn more about \"Learning About Stress.\"  Current as of: October 24, 2023  Content Version: 14.3    2024 [employer]. "   Care instructions adapted under license by your healthcare professional. If you have questions about a medical condition or this instruction, always ask your healthcare professional. [employer] disclaims any warranty or liability for your use of this information.

## 2025-01-28 NOTE — PROGRESS NOTES
"Preventive Care Visit  [facility]  Amy Chandler DO, Internal Medicine  Jan 28, 2025      Assessment & Plan     Encounter for Medicare annual wellness exam    Essential hypertension   - stable, refill provided  - BASIC METABOLIC PANEL; Future  - atenolol (TENORMIN) 25 MG tablet; Take 1 tablet (25 mg) by mouth daily.  - losartan (COZAAR) 50 MG tablet; Take 1 tablet (50 mg) by mouth daily.  - CBC with platelets; Future  - BASIC METABOLIC PANEL  - CBC with platelets    Hypertriglyceridemia  Check lab  - Lipid panel reflex to direct LDL Fasting; Future  - Lipid panel reflex to direct LDL Fasting    Gastroesophageal reflux disease without esophagitis   - stable, refill provided  - famotidine (PEPCID) 40 MG tablet; Take 1 tablet (40 mg) by mouth daily.    Prediabetes  Check lab  - Hemoglobin A1c; Future  - Hemoglobin A1c    Seasonal allergic rhinitis, unspecified trigger   - stable, refill provided  - albuterol (PROAIR HFA/PROVENTIL HFA/VENTOLIN HFA) 108 (90 Base) MCG/ACT inhaler; Inhale 2 puffs into the lungs every 4 hours as needed for shortness of breath or wheezing.    Patient has been advised of split billing requirements and indicates understanding: Yes        BMI  Estimated body mass index is 25.86 kg/m  as calculated from the following:    Height as of this encounter: 1.638 m (5' 4.5\").    Weight as of this encounter: 69.4 kg (153 lb).       Counseling  Appropriate preventive services were addressed with this patient via screening, questionnaire, or discussion as appropriate for fall prevention, nutrition, physical activity, Tobacco-use cessation, social engagement, weight loss and cognition.  Checklist reviewing preventive services available has been given to the patient.  Reviewed patient's diet, addressing concerns and/or questions.   The patient was instructed to see the dentist every 6 months.   He is at risk for psychosocial distress and has been provided with information " to reduce risk.           Trent Guerrero is a 75 year old, presenting for the following:  Physical, Hypertension, and Refill Request (Would like 90 days supply )        1/28/2025    11:04 AM   Additional Questions   Roomed by zaida   Accompanied by self         1/28/2025    11:04 AM   Patient Reported Additional Medications   Patient reports taking the following new medications none           HPI      Chief Complaint   Patient presents with    Physical    Hypertension    Refill Request     Would like 90 days supply          Hypertension Follow-up    Do you check your blood pressure regularly outside of the clinic? Yes   Are you following a low salt diet? Yes  Are your blood pressures ever more than 140 on the top number (systolic) OR more than 90 on the bottom number (diastolic), for example 140/90? No  Tracked his blood pressure at home the last week and 110-128/70-80, pulse in 60s    Abdominal wall abscess  Incisional Hernia  --He had acute cholecystitis 6/9/2022 that was a complicated surgery.  Ultimately during the cholecystectomy, gallstones spilled out into the peritoneal cavity.  He developed postoperative pancreatitis 7/2022.  He had a retained stone that went on to develop a psoas abscess and then an abdominal wall abscess.  He had multiple courses of antibiotics for the psoas abscess.  The psoas abscess was drained as an outpatient with IR on 6/12/2023.  Unfortunately this did not eradicate the infection and he developed an abdominal wall abscess requiring hospital admission earlier this month-anterior abdominal wall right side abscess pocket with retained biliary stone, potentially fistula, s/p retained foreign body.  I&D on 7/10.  Open wound with packing.  Wound eventually healed by secondary intention.  He developed an incisional hernia that was repaired in March  --he reports only since Oct has he been feeling back to normal  --still wears an abdominal binder with heavy lifting/working,  etc      LUTS  --has 1-2 nocturia; n o longer having daytime symptoms, - urgency, retention    Health Care Directive  Patient has a Health Care Directive on file  Advance care planning document is on file and is current.      1/23/2025   General Health   How would you rate your overall physical health? Good   Feel stress (tense, anxious, or unable to sleep) Rather much   (!) STRESS CONCERN      1/23/2025   Nutrition   Diet: Regular (no restrictions)         1/23/2025   Exercise   Days per week of moderate/strenous exercise 7 days   Average minutes spent exercising at this level 60 min         1/23/2025   Social Factors   Frequency of gathering with friends or relatives More than three times a week   Worry food won't last until get money to buy more No   Food not last or not have enough money for food? No   Do you have housing? (Housing is defined as stable permanent housing and does not include staying ouside in a car, in a tent, in an abandoned building, in an overnight shelter, or couch-surfing.) Yes   Are you worried about losing your housing? No   Lack of transportation? No   Unable to get utilities (heat,electricity)? No         1/23/2025   Fall Risk   Fallen 2 or more times in the past year? No    No   Trouble with walking or balance? No    No       Multiple values from one day are sorted in reverse-chronological order          1/23/2025   Activities of Daily Living- Home Safety   Needs help with the following daily activites None of the above   Safety concerns in the home None of the above         1/23/2025   Dental   Dentist two times every year? (!) NO         1/23/2025   Hearing Screening   Hearing concerns? None of the above         1/23/2025   Driving Risk Screening   Patient/family members have concerns about driving No         1/23/2025   General Alertness/Fatigue Screening   Have you been more tired than usual lately? No         1/23/2025   Urinary Incontinence Screening   Bothered by leaking urine in  past 6 months No         2025   TB Screening   Were you born outside of the [place]? No           Today's PHQ-2 Score:       2025    11:08 AM   PHQ-2 (  [employer])   Q1: Little interest or pleasure in doing things 0    Q2: Feeling down, depressed or hopeless 0    PHQ-2 Score 0    Q1: Little interest or pleasure in doing things Not at all   Q2: Feeling down, depressed or hopeless Not at all   PHQ-2 Score 0       Proxy-reported         2025   Substance Use   Alcohol more than 3/day or more than 7/wk Not Applicable   Do you have a current opioid prescription? No   How severe/bad is pain from 1 to 10? 0/10 (No Pain)   Do you use any other substances recreationally? No     Social History     Tobacco Use    Smoking status: Former     Current packs/day: 0.00     Average packs/day: 0.5 packs/day for 4.0 years (2.0 ttl pk-yrs)     Types: Cigarettes     Start date: 1967     Quit date: 1971     Years since quittin.1    Smokeless tobacco: Never   Vaping Use    Vaping status: Never Used   Substance Use Topics    Alcohol use: No    Drug use: No       ASCVD Risk   The 10-year ASCVD risk score (Marty LAW, et al., 2019) is: 29.2%    Values used to calculate the score:      Age: 75 years      Sex: Male      Is Non- : No      Diabetic: No      Tobacco smoker: No      Systolic Blood Pressure: 120 mmHg      Is BP treated: Yes      HDL Cholesterol: 40 mg/dL      Total Cholesterol: 164 mg/dL            Reviewed and updated as needed this visit by Provider                    Current Outpatient Medications   Medication Sig Dispense Refill    albuterol (PROAIR HFA/PROVENTIL HFA/VENTOLIN HFA) 108 (90 Base) MCG/ACT inhaler Inhale 2 puffs into the lungs every 4 hours as needed for shortness of breath or wheezing 18 g 11    atenolol (TENORMIN) 25 MG tablet TAKE 1 TABLET BY MOUTH ONCE DAILY 30 tablet 0    famotidine (PEPCID) 40 MG tablet Take 1 tablet (40 mg) by mouth daily 90 tablet 3     "losartan (COZAAR) 50 MG tablet Take 1 tablet (50 mg) by mouth daily 90 tablet 3    MULTIVITAMINS OR TABS Take 1 tablet by mouth daily 100 3    Probiotic Product (PRO-BIOTIC BLEND) CAPS Take 1 capsule by mouth daily      psyllium (METAMUCIL/KONSYL) 58.6 % powder Take 2 teaspoonful by mouth daily (before supper)       Current providers sharing in care for this patient include:  Patient Care Team:  Amy Chandler DO as PCP - General (Internal Medicine)  Amy Chandler DO as Assigned PCP    The following health maintenance items are reviewed in Epic and correct as of today:  Health Maintenance   Topic Date Due    ANNUAL REVIEW OF HM ORDERS  07/21/2024    BMP  01/03/2025    LIPID  01/03/2025    MEDICARE ANNUAL WELLNESS VISIT  01/03/2025    RSV VACCINE (1 - 1-dose 75+ series) 01/28/2026 (Originally 1/17/2023)    DTAP/TDAP/TD IMMUNIZATION (2 - Td or Tdap) 03/16/2025    FALL RISK ASSESSMENT  01/28/2026    GLUCOSE  01/03/2027    ADVANCE CARE PLANNING  01/04/2029    COLORECTAL CANCER SCREENING  08/30/2032    HEPATITIS C SCREENING  Completed    PHQ-2 (once per calendar year)  Completed    INFLUENZA VACCINE  Completed    Pneumococcal Vaccine: 50+ Years  Completed    ZOSTER IMMUNIZATION  Completed    COVID-19 Vaccine  Completed    HPV IMMUNIZATION  Aged Out    MENINGITIS IMMUNIZATION  Aged Out    RSV MONOCLONAL ANTIBODY  Aged Out         Review of Systems  Constitutional, neuro, ENT, endocrine, pulmonary, cardiac, gastrointestinal, genitourinary, musculoskeletal, integument and psychiatric systems are negative, except as otherwise noted.     Objective    Exam  /84 (BP Location: Right arm, Patient Position: Sitting, Cuff Size: Adult Regular)   Pulse 51   Temp 97.2  F (36.2  C) (Tympanic)   Resp 12   Ht 1.638 m (5' 4.5\")   Wt 69.4 kg (153 lb)   SpO2 98%   BMI 25.86 kg/m     Estimated body mass index is 25.86 kg/m  as calculated from the following:    Height as of this encounter: 1.638 m (5' 4.5\").    " Weight as of this encounter: 69.4 kg (153 lb).    Physical Exam  GENERAL: alert and no distress  EYES: Eyes grossly normal to inspection, PERRL and conjunctivae and sclerae normal  HENT: ear canals and TM's normal, nose and mouth without ulcers or lesions  NECK: no adenopathy, no asymmetry, masses, or scars  RESP: lungs clear to auscultation - no rales, rhonchi or wheezes  CV: regular rate and rhythm, normal S1 S2, no S3 or S4, no murmur, click or rub, no peripheral edema  ABDOMEN: soft, non-tender, multiple well-healed abdominal incisions with mild incisional hernia, easily reducible  MS: no gross musculoskeletal defects noted, no edema  SKIN: no suspicious lesions or rashes  NEURO: Normal strength and tone, mentation intact and speech normal  PSYCH: mentation appears normal, affect normal/bright         1/28/2025   Mini Cog   Clock Draw Score 2 Normal   3 Item Recall 2 objects recalled   Mini Cog Total Score 4              Signed Electronically by: Amy Chandler DO

## 2025-08-11 ENCOUNTER — OFFICE VISIT (OUTPATIENT)
Dept: DERMATOLOGY | Facility: CLINIC | Age: 75
End: 2025-08-11
Payer: MEDICARE

## 2025-08-11 ENCOUNTER — TELEPHONE (OUTPATIENT)
Dept: DERMATOLOGY | Facility: CLINIC | Age: 75
End: 2025-08-11

## 2025-08-11 DIAGNOSIS — C44.311 BASAL CELL CARCINOMA (BCC) OF RIGHT SIDE OF NOSE: Primary | ICD-10-CM

## 2025-08-11 DIAGNOSIS — Z12.83 SKIN CANCER SCREENING: ICD-10-CM

## 2025-08-11 DIAGNOSIS — D48.5 NEOPLASM OF UNCERTAIN BEHAVIOR OF SKIN: ICD-10-CM

## 2025-08-11 DIAGNOSIS — L57.0 ACTINIC KERATOSES: ICD-10-CM

## 2025-08-11 DIAGNOSIS — D22.9 MULTIPLE BENIGN NEVI: Primary | ICD-10-CM

## 2025-08-11 DIAGNOSIS — L82.1 SEBORRHEIC KERATOSES: ICD-10-CM

## 2025-08-11 DIAGNOSIS — D18.01 CHERRY ANGIOMA: ICD-10-CM

## 2025-08-11 PROCEDURE — 88331 PATH CONSLTJ SURG 1 BLK 1SPC: CPT | Performed by: DERMATOLOGY

## 2025-08-26 ENCOUNTER — OFFICE VISIT (OUTPATIENT)
Dept: DERMATOLOGY | Facility: CLINIC | Age: 75
End: 2025-08-26
Payer: MEDICARE

## 2025-08-26 DIAGNOSIS — C44.311 BASAL CELL CARCINOMA (BCC) OF RIGHT SIDE OF NOSE: ICD-10-CM

## 2025-08-26 DIAGNOSIS — L82.1 SEBORRHEIC KERATOSES: ICD-10-CM

## 2025-08-26 DIAGNOSIS — D18.01 ANGIOMA OF SKIN: ICD-10-CM

## 2025-08-26 DIAGNOSIS — D23.9 DERMAL NEVUS: Primary | ICD-10-CM

## 2025-08-26 DIAGNOSIS — L81.4 LENTIGO: ICD-10-CM

## 2025-08-26 PROCEDURE — 99213 OFFICE O/P EST LOW 20 MIN: CPT | Mod: 25 | Performed by: DERMATOLOGY

## 2025-08-26 PROCEDURE — 1126F AMNT PAIN NOTED NONE PRSNT: CPT | Performed by: DERMATOLOGY

## 2025-08-26 PROCEDURE — 17311 MOHS 1 STAGE H/N/HF/G: CPT | Performed by: DERMATOLOGY

## 2025-08-26 PROCEDURE — 14061 TIS TRNFR E/N/E/L10.1-30SQCM: CPT | Performed by: DERMATOLOGY

## 2025-08-26 ASSESSMENT — PAIN SCALES - GENERAL: PAINLEVEL_OUTOF10: NO PAIN (0)

## 2025-09-03 ENCOUNTER — ALLIED HEALTH/NURSE VISIT (OUTPATIENT)
Dept: DERMATOLOGY | Facility: CLINIC | Age: 75
End: 2025-09-03
Payer: MEDICARE

## 2025-09-03 DIAGNOSIS — Z48.01 ENCOUNTER FOR CHANGE OR REMOVAL OF SURGICAL WOUND DRESSING: Primary | ICD-10-CM

## 2025-09-03 PROCEDURE — 99207 PR NO CHARGE NURSE ONLY: CPT

## 2025-09-04 ENCOUNTER — HOSPITAL ENCOUNTER (EMERGENCY)
Facility: CLINIC | Age: 75
Discharge: HOME | End: 2025-09-04
Attending: FAMILY MEDICINE
Payer: MEDICARE

## 2025-09-04 VITALS
RESPIRATION RATE: 18 BRPM | OXYGEN SATURATION: 98 % | DIASTOLIC BLOOD PRESSURE: 71 MMHG | HEART RATE: 66 BPM | TEMPERATURE: 95.2 F | SYSTOLIC BLOOD PRESSURE: 137 MMHG

## 2025-09-04 DIAGNOSIS — L02.211 CUTANEOUS ABSCESS OF ABDOMINAL WALL: Primary | ICD-10-CM

## 2025-09-04 LAB
ALBUMIN SERPL BCG-MCNC: 4.4 G/DL (ref 3.5–5.2)
ALBUMIN UR-MCNC: NEGATIVE MG/DL
ALP SERPL-CCNC: 102 U/L (ref 40–150)
ALT SERPL W P-5'-P-CCNC: 17 U/L (ref 0–70)
ANION GAP SERPL CALCULATED.3IONS-SCNC: 10 MMOL/L (ref 7–15)
APPEARANCE UR: CLEAR
AST SERPL W P-5'-P-CCNC: 21 U/L (ref 0–45)
BASOPHILS # BLD AUTO: 0.05 10E3/UL (ref 0–0.2)
BASOPHILS NFR BLD AUTO: 0.5 %
BILIRUB SERPL-MCNC: 0.3 MG/DL
BILIRUB UR QL STRIP: NEGATIVE
BUN SERPL-MCNC: 23.4 MG/DL (ref 8–23)
CALCIUM SERPL-MCNC: 10.1 MG/DL (ref 8.8–10.4)
CHLORIDE SERPL-SCNC: 106 MMOL/L (ref 98–107)
COLOR UR AUTO: NORMAL
CREAT SERPL-MCNC: 1.17 MG/DL (ref 0.67–1.17)
CRP SERPL-MCNC: 25.26 MG/L
EGFRCR SERPLBLD CKD-EPI 2021: 64 ML/MIN/1.73M2
EOSINOPHIL # BLD AUTO: 0.19 10E3/UL (ref 0–0.7)
EOSINOPHIL NFR BLD AUTO: 1.9 %
ERYTHROCYTE [DISTWIDTH] IN BLOOD BY AUTOMATED COUNT: 13.5 % (ref 10–15)
GLUCOSE SERPL-MCNC: 88 MG/DL (ref 70–99)
GLUCOSE UR STRIP-MCNC: NEGATIVE MG/DL
HCO3 SERPL-SCNC: 24 MMOL/L (ref 22–29)
HCT VFR BLD AUTO: 39.7 % (ref 40–53)
HGB BLD-MCNC: 13.3 G/DL (ref 13.3–17.7)
HGB UR QL STRIP: NEGATIVE
HOLD SPECIMEN: NORMAL
HYALINE CASTS: 1 /LPF
IMM GRANULOCYTES # BLD: 0.06 10E3/UL
IMM GRANULOCYTES NFR BLD: 0.6 %
KETONES UR STRIP-MCNC: NEGATIVE MG/DL
LEUKOCYTE ESTERASE UR QL STRIP: NEGATIVE
LIPASE SERPL-CCNC: 36 U/L (ref 13–60)
LYMPHOCYTES # BLD AUTO: 2.68 10E3/UL (ref 0.8–5.3)
LYMPHOCYTES NFR BLD AUTO: 27 %
MCH RBC QN AUTO: 31.6 PG (ref 26.5–33)
MCHC RBC AUTO-ENTMCNC: 33.5 G/DL (ref 31.5–36.5)
MCV RBC AUTO: 94.3 FL (ref 78–100)
MONOCYTES # BLD AUTO: 0.93 10E3/UL (ref 0–1.3)
MONOCYTES NFR BLD AUTO: 9.4 %
NEUTROPHILS # BLD AUTO: 6.03 10E3/UL (ref 1.6–8.3)
NEUTROPHILS NFR BLD AUTO: 60.6 %
NITRATE UR QL: NEGATIVE
NRBC # BLD AUTO: <0.03 10E3/UL
NRBC BLD AUTO-RTO: 0 /100
PH UR STRIP: 6.5 [PH] (ref 5–7)
PLATELET # BLD AUTO: 292 10E3/UL (ref 150–450)
POTASSIUM SERPL-SCNC: 4.5 MMOL/L (ref 3.4–5.3)
PROT SERPL-MCNC: 9.2 G/DL (ref 6.4–8.3)
RBC # BLD AUTO: 4.21 10E6/UL (ref 4.4–5.9)
RBC URINE: 1 /HPF
SODIUM SERPL-SCNC: 140 MMOL/L (ref 135–145)
SP GR UR STRIP: 1.01 (ref 1–1.03)
UROBILINOGEN UR STRIP-MCNC: NORMAL MG/DL
WBC # BLD AUTO: 9.94 10E3/UL (ref 4–11)
WBC URINE: 0 /HPF

## 2025-09-04 PROCEDURE — 255N000002 HC RX 255 OP 636: Performed by: FAMILY MEDICINE

## 2025-09-04 PROCEDURE — 258N000003 HC RX IP 258 OP 636: Performed by: FAMILY MEDICINE

## 2025-09-04 PROCEDURE — 86140 C-REACTIVE PROTEIN: CPT | Performed by: FAMILY MEDICINE

## 2025-09-04 PROCEDURE — 36415 COLL VENOUS BLD VENIPUNCTURE: CPT | Performed by: FAMILY MEDICINE

## 2025-09-04 PROCEDURE — 81003 URINALYSIS AUTO W/O SCOPE: CPT | Performed by: FAMILY MEDICINE

## 2025-09-04 PROCEDURE — 85004 AUTOMATED DIFF WBC COUNT: CPT | Performed by: FAMILY MEDICINE

## 2025-09-04 PROCEDURE — 83690 ASSAY OF LIPASE: CPT | Performed by: FAMILY MEDICINE

## 2025-09-04 PROCEDURE — 84520 ASSAY OF UREA NITROGEN: CPT | Performed by: FAMILY MEDICINE

## 2025-09-04 PROCEDURE — 250N000013 HC RX MED GY IP 250 OP 250 PS 637: Performed by: FAMILY MEDICINE

## 2025-09-04 PROCEDURE — 250N000009 HC RX 250: Performed by: FAMILY MEDICINE

## 2025-09-04 RX ORDER — ONDANSETRON 2 MG/ML
4 INJECTION INTRAMUSCULAR; INTRAVENOUS ONCE
Status: COMPLETED | OUTPATIENT
Start: 2025-09-04 | End: 2025-09-04

## 2025-09-04 RX ADMIN — IOHEXOL 79 ML: 350 INJECTION, SOLUTION INTRAVENOUS at 17:54

## 2025-09-04 RX ADMIN — SODIUM CHLORIDE 1000 ML: 0.9 INJECTION, SOLUTION INTRAVENOUS at 17:16

## 2025-09-04 RX ADMIN — AMOXICILLIN AND CLAVULANATE POTASSIUM 1 TABLET: 875; 125 TABLET, FILM COATED ORAL at 19:59

## 2025-09-04 RX ADMIN — SODIUM CHLORIDE 60 ML: 9 INJECTION, SOLUTION INTRAVENOUS at 17:54

## 2025-09-04 ASSESSMENT — COLUMBIA-SUICIDE SEVERITY RATING SCALE - C-SSRS
6. HAVE YOU EVER DONE ANYTHING, STARTED TO DO ANYTHING, OR PREPARED TO DO ANYTHING TO END YOUR LIFE?: NO
1. IN THE PAST MONTH, HAVE YOU WISHED YOU WERE DEAD OR WISHED YOU COULD GO TO SLEEP AND NOT WAKE UP?: NO
2. HAVE YOU ACTUALLY HAD ANY THOUGHTS OF KILLING YOURSELF IN THE PAST MONTH?: NO

## 2025-09-04 ASSESSMENT — ACTIVITIES OF DAILY LIVING (ADL)
ADLS_ACUITY_SCORE: 58

## (undated) DEVICE — BLADE KNIFE SURG 15 371115

## (undated) DEVICE — SYR BULB IRRIG DOVER 60 ML LATEX FREE 67000

## (undated) DEVICE — GOWN IMPERVIOUS BREATHABLE SMART LG 89015

## (undated) DEVICE — SU DERMABOND ADVANCED .7ML DNX12

## (undated) DEVICE — DEVICE ART RELIATACK RELOAD 8 DEEP PURC TACKS

## (undated) DEVICE — ESU CORD MONOPOLAR 10'  E0510

## (undated) DEVICE — SYSTEM LAPAROVUE VISIBILITY LAPVUE10

## (undated) DEVICE — ENDO TROCAR SLEEVE KII Z-THREADED 05X100MM CTS02

## (undated) DEVICE — DEVICE TACKER ENDO RELIATACK ARTIC HNDL 3X8 RELTACK4XDPTSW

## (undated) DEVICE — SU VICRYL+ 0 27 UR6 VLT VCP603H

## (undated) DEVICE — DRAPE TIBURON GENERAL ENDOSCOPY 9458

## (undated) DEVICE — TUBING SMOKE EVAC PNEUMOCLEAR HIGH FLOW 0620050250

## (undated) DEVICE — ESU LIGASURE MARYLAND LAPAROSCOPIC SLR/DVDR 5MMX37CM LF1937

## (undated) DEVICE — ENDO TROCAR FIRST ENTRY KII FIOS ADV FIX 12X100MM CFF73

## (undated) DEVICE — ESU GROUND PAD ADULT REM W/15' CORD E7507DB

## (undated) DEVICE — SU MONOCRYL 4-0 PS-2 18" UND Y496G

## (undated) DEVICE — CLIP APPLIER ENDO 5MM M/L LIGAMAX EL5ML

## (undated) DEVICE — ENDO TROCAR FIRST ENTRY KII FIOS ADV FIX 05X100MM CFF03

## (undated) DEVICE — SOL WATER IRRIG 1000ML BOTTLE 2F7114

## (undated) DEVICE — GLOVE BIOGEL PI ULTRATOUCH G SZ 7.5 42175

## (undated) DEVICE — SOL NACL 0.9% IRRIG 1000ML BOTTLE 2F7124

## (undated) DEVICE — MITT PRE-OP 7 L X 5 1/2 W 5177M1

## (undated) DEVICE — SYR 10ML LL W/O NDL 302995

## (undated) DEVICE — SUCTION MANIFOLD NEPTUNE 2 SYS 1 PORT 702-025-000

## (undated) DEVICE — Device

## (undated) DEVICE — PLATE GROUNDING ADULT W/CORD 9165L

## (undated) DEVICE — TUBING SUCTION MEDI-VAC 1/4"X20' N620A

## (undated) DEVICE — SUTURE VICRYL+ 4-0 UNDYED PS-2 VCP496H

## (undated) DEVICE — DRSG GAUZE 2X2" TRAY 1806

## (undated) DEVICE — DRAPE POUCH INSTRUMENT 3 POCKET 1018L

## (undated) DEVICE — DRAPE LAP/CHOLE W/O POUCH 89225

## (undated) DEVICE — DEVICE ART RELIATACK RELOADÂ 5 DEEP PURC RELTACK5RDPTSW

## (undated) DEVICE — ADH SKIN CLOSURE PREMIERPRO EXOFIN 1.0ML 3470

## (undated) DEVICE — DRAPE IOBAN INCISE 23X17" 6650EZ

## (undated) DEVICE — DRAPE SHEET REV FOLD 3/4 9349

## (undated) DEVICE — ESU ENDO SCISSORS 5MM CVD 5DCS

## (undated) DEVICE — ENDO TROCAR SHIELDED BLADED KII Z-THRD 11X100MM CTB33

## (undated) DEVICE — DRSG GAUZE 4X4" 3033

## (undated) DEVICE — ESU PENCIL SMOKE EVAC W/ROCKER SWITCH 0703-047-000

## (undated) DEVICE — WECK EFX CONES AND SUTURE PASSER EFXCT1

## (undated) DEVICE — ENDO TROCAR SHIELDED BLADED KII Z-THRD 05X100MM CTB03

## (undated) DEVICE — NEEDLE HYPO MAGELLAN SAFETY 22GA 1 1/2IN 8881850215

## (undated) DEVICE — CUSTOM PACK LAP CHOLE SBA5BLCHEA

## (undated) DEVICE — ESU HOLSTER PLASTIC DISP E2400

## (undated) DEVICE — STOCKING SLEEVE COMPRESSION CALF MED

## (undated) DEVICE — MARKER SURG SKIN 2 TIP STRL SPP99DT2AA

## (undated) DEVICE — PREP CHLORAPREP 26ML TINTED HI-LITE ORANGE 930815

## (undated) DEVICE — GLOVE PROTEXIS W/NEU-THERA 5.5  2D73TE55

## (undated) DEVICE — BLADE CLIPPER DISP 4406

## (undated) DEVICE — SOL NACL 0.9% INJ 1000ML BAG 07983-09

## (undated) DEVICE — SPECIMEN TRAP VACUUM SUCTION 003984-901

## (undated) DEVICE — DECANTER VIAL 2006S

## (undated) DEVICE — GLOVE PROTEXIS BLUE W/NEU-THERA 6.0  2D73EB60

## (undated) DEVICE — GOWN LG DISP 9515

## (undated) DEVICE — NDL INSUFFLATION 13GA 120MM C2201

## (undated) DEVICE — BLADE KNIFE SURG 10 371110

## (undated) DEVICE — CUSTOM PACK MINOR SBA5BMNHEA

## (undated) DEVICE — ENDO SNARE EXACTO COLD 9MM LOOP 2.4MMX230CM 00711115

## (undated) DEVICE — DEVICE SUTURE GRASPER TROCAR CLOSURE 14GA PMITCSG

## (undated) DEVICE — SU VICRYL 0 TIE 54" UND J287G

## (undated) DEVICE — ENDO POUCH UNIV RETRIEVAL SYSTEM INZII 10MM CD001

## (undated) DEVICE — PREP CHLORAPREP 26ML TINTED ORANGE  260815

## (undated) DEVICE — SUCTION IRR STRYKERFLOW II W/TIP 250-070-520

## (undated) DEVICE — DRAPE OR LAPAROTOMY KC 89228*

## (undated) DEVICE — ANTIFOG SOLUTION SEE SHARP 150M TROCAR SWABS 30978

## (undated) DEVICE — BAG PRESSURE INFUSION 1000ML COLORED GA 8808

## (undated) RX ORDER — ACETAMINOPHEN 325 MG/1
TABLET ORAL
Status: DISPENSED
Start: 2022-06-09

## (undated) RX ORDER — PROPOFOL 10 MG/ML
INJECTION, EMULSION INTRAVENOUS
Status: DISPENSED
Start: 2024-03-05

## (undated) RX ORDER — FENTANYL CITRATE 50 UG/ML
INJECTION, SOLUTION INTRAMUSCULAR; INTRAVENOUS
Status: DISPENSED
Start: 2024-03-05

## (undated) RX ORDER — ONDANSETRON 2 MG/ML
INJECTION INTRAMUSCULAR; INTRAVENOUS
Status: DISPENSED
Start: 2024-03-05

## (undated) RX ORDER — EPHEDRINE SULFATE 50 MG/ML
INJECTION, SOLUTION INTRAMUSCULAR; INTRAVENOUS; SUBCUTANEOUS
Status: DISPENSED
Start: 2022-06-09

## (undated) RX ORDER — LIDOCAINE HYDROCHLORIDE 10 MG/ML
INJECTION, SOLUTION EPIDURAL; INFILTRATION; INTRACAUDAL; PERINEURAL
Status: DISPENSED
Start: 2024-03-05

## (undated) RX ORDER — BUPIVACAINE HYDROCHLORIDE AND EPINEPHRINE 2.5; 5 MG/ML; UG/ML
INJECTION, SOLUTION EPIDURAL; INFILTRATION; INTRACAUDAL; PERINEURAL
Status: DISPENSED
Start: 2023-07-10

## (undated) RX ORDER — PROPOFOL 10 MG/ML
INJECTION, EMULSION INTRAVENOUS
Status: DISPENSED
Start: 2023-07-10

## (undated) RX ORDER — ONDANSETRON 2 MG/ML
INJECTION INTRAMUSCULAR; INTRAVENOUS
Status: DISPENSED
Start: 2023-07-10

## (undated) RX ORDER — DEXAMETHASONE SODIUM PHOSPHATE 4 MG/ML
INJECTION, SOLUTION INTRA-ARTICULAR; INTRALESIONAL; INTRAMUSCULAR; INTRAVENOUS; SOFT TISSUE
Status: DISPENSED
Start: 2022-06-09

## (undated) RX ORDER — FENTANYL CITRATE 50 UG/ML
INJECTION, SOLUTION INTRAMUSCULAR; INTRAVENOUS
Status: DISPENSED
Start: 2022-06-09

## (undated) RX ORDER — EPHEDRINE SULFATE 50 MG/ML
INJECTION, SOLUTION INTRAMUSCULAR; INTRAVENOUS; SUBCUTANEOUS
Status: DISPENSED
Start: 2024-03-05

## (undated) RX ORDER — CEFAZOLIN SODIUM/WATER 2 G/20 ML
SYRINGE (ML) INTRAVENOUS
Status: DISPENSED
Start: 2022-06-09

## (undated) RX ORDER — GABAPENTIN 100 MG/1
CAPSULE ORAL
Status: DISPENSED
Start: 2022-06-09

## (undated) RX ORDER — MAGNESIUM SULFATE HEPTAHYDRATE 40 MG/ML
INJECTION, SOLUTION INTRAVENOUS
Status: DISPENSED
Start: 2022-06-09

## (undated) RX ORDER — FENTANYL CITRATE-0.9 % NACL/PF 10 MCG/ML
PLASTIC BAG, INJECTION (ML) INTRAVENOUS
Status: DISPENSED
Start: 2023-07-10

## (undated) RX ORDER — FENTANYL CITRATE-0.9 % NACL/PF 10 MCG/ML
PLASTIC BAG, INJECTION (ML) INTRAVENOUS
Status: DISPENSED
Start: 2022-06-09

## (undated) RX ORDER — CEFAZOLIN SODIUM 1 G/3ML
INJECTION, POWDER, FOR SOLUTION INTRAMUSCULAR; INTRAVENOUS
Status: DISPENSED
Start: 2024-03-05

## (undated) RX ORDER — GLYCOPYRROLATE 0.2 MG/ML
INJECTION, SOLUTION INTRAMUSCULAR; INTRAVENOUS
Status: DISPENSED
Start: 2023-07-10

## (undated) RX ORDER — EPHEDRINE SULFATE 50 MG/ML
INJECTION, SOLUTION INTRAMUSCULAR; INTRAVENOUS; SUBCUTANEOUS
Status: DISPENSED
Start: 2023-07-10

## (undated) RX ORDER — DEXAMETHASONE SODIUM PHOSPHATE 10 MG/ML
INJECTION, SOLUTION INTRAMUSCULAR; INTRAVENOUS
Status: DISPENSED
Start: 2024-03-05

## (undated) RX ORDER — BUPIVACAINE HYDROCHLORIDE AND EPINEPHRINE 2.5; 5 MG/ML; UG/ML
INJECTION, SOLUTION EPIDURAL; INFILTRATION; INTRACAUDAL; PERINEURAL
Status: DISPENSED
Start: 2022-06-09

## (undated) RX ORDER — DEXAMETHASONE SODIUM PHOSPHATE 10 MG/ML
INJECTION, SOLUTION INTRAMUSCULAR; INTRAVENOUS
Status: DISPENSED
Start: 2023-07-10

## (undated) RX ORDER — FENTANYL CITRATE 50 UG/ML
INJECTION, SOLUTION INTRAMUSCULAR; INTRAVENOUS
Status: DISPENSED
Start: 2023-07-10